# Patient Record
Sex: MALE | Race: WHITE | NOT HISPANIC OR LATINO | Employment: OTHER | URBAN - METROPOLITAN AREA
[De-identification: names, ages, dates, MRNs, and addresses within clinical notes are randomized per-mention and may not be internally consistent; named-entity substitution may affect disease eponyms.]

---

## 2018-04-25 ENCOUNTER — TELEPHONE (OUTPATIENT)
Dept: FAMILY MEDICINE | Facility: CLINIC | Age: 33
End: 2018-04-25

## 2018-04-25 NOTE — TELEPHONE ENCOUNTER
LVM for pt regarding today's appt at 2:30 asking him to return the call to confirm the appt and explaining that he will be seeing Juan Villa NP.  In [place], it is documented that the patient was called three times to confirm the appt and that someone would  the phone and hang up.  Danika

## 2018-05-24 ENCOUNTER — OFFICE VISIT (OUTPATIENT)
Dept: FAMILY MEDICINE | Facility: CLINIC | Age: 33
End: 2018-05-24
Payer: MEDICAID

## 2018-05-24 ENCOUNTER — TELEPHONE (OUTPATIENT)
Dept: FAMILY MEDICINE | Facility: CLINIC | Age: 33
End: 2018-05-24

## 2018-05-24 VITALS
HEART RATE: 104 BPM | BODY MASS INDEX: 30.96 KG/M2 | HEIGHT: 69 IN | DIASTOLIC BLOOD PRESSURE: 67 MMHG | SYSTOLIC BLOOD PRESSURE: 97 MMHG | WEIGHT: 209 LBS | RESPIRATION RATE: 18 BRPM

## 2018-05-24 DIAGNOSIS — M62.838 MUSCLE SPASMS OF BOTH LOWER EXTREMITIES: ICD-10-CM

## 2018-05-24 DIAGNOSIS — L85.3 DRY SKIN: ICD-10-CM

## 2018-05-24 DIAGNOSIS — G82.20 PARAPLEGIA: Primary | ICD-10-CM

## 2018-05-24 PROCEDURE — 99213 OFFICE O/P EST LOW 20 MIN: CPT | Mod: PBBFAC,PN | Performed by: FAMILY MEDICINE

## 2018-05-24 PROCEDURE — 99203 OFFICE O/P NEW LOW 30 MIN: CPT | Mod: S$PBB,,, | Performed by: FAMILY MEDICINE

## 2018-05-24 PROCEDURE — 99999 PR PBB SHADOW E&M-EST. PATIENT-LVL III: CPT | Mod: PBBFAC,,, | Performed by: FAMILY MEDICINE

## 2018-05-24 RX ORDER — BACLOFEN 10 MG/1
10 TABLET ORAL 3 TIMES DAILY
Qty: 90 TABLET | Refills: 11 | Status: SHIPPED | OUTPATIENT
Start: 2018-05-24 | End: 2019-06-12 | Stop reason: SDUPTHER

## 2018-05-24 RX ORDER — TRIAMCINOLONE ACETONIDE 1 MG/G
OINTMENT TOPICAL 2 TIMES DAILY
Qty: 80 G | Refills: 2 | Status: SHIPPED | OUTPATIENT
Start: 2018-05-24 | End: 2020-06-05

## 2018-05-24 RX ORDER — NITROFURANTOIN 25; 75 MG/1; MG/1
CAPSULE ORAL
Refills: 0 | COMMUNITY
Start: 2018-05-17 | End: 2018-07-06
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
## 2018-05-24 NOTE — TELEPHONE ENCOUNTER
I have spoken with Rudi and Tesha at [facility] regarding hand brace and wheelchair.  They do currently have him in their system, the wheelchair that he currently has is issued through them; explained that this wheelchair is not appropriate for patient's condition.  Lucy Vale, representative who handles hand braces, will be notified of need for right hand brace for pt and likely go to patient's home to assess as well as an [employer] person will go to patient's home to assess for special needs wheelchair  They will be back in touch with our office.  Josie

## 2018-05-29 ENCOUNTER — CLINICAL SUPPORT (OUTPATIENT)
Dept: REHABILITATION | Facility: HOSPITAL | Age: 33
End: 2018-05-29
Attending: FAMILY MEDICINE
Payer: MEDICAID

## 2018-05-29 DIAGNOSIS — S14.109D INJURY OF CERVICAL SPINAL CORD, SUBSEQUENT ENCOUNTER: Primary | ICD-10-CM

## 2018-05-29 DIAGNOSIS — R29.898 BILATERAL LEG WEAKNESS: ICD-10-CM

## 2018-05-29 DIAGNOSIS — G82.20 SPINAL CORD ISCHEMIA CAUSING LOWER EXTREMITY PARAPARESIS: ICD-10-CM

## 2018-05-29 DIAGNOSIS — R26.2 DIFFICULTY INITIATING WALKING: ICD-10-CM

## 2018-05-29 DIAGNOSIS — G95.11 SPINAL CORD ISCHEMIA CAUSING LOWER EXTREMITY PARAPARESIS: ICD-10-CM

## 2018-05-29 PROCEDURE — 97167 OT EVAL HIGH COMPLEX 60 MIN: CPT | Mod: PN

## 2018-05-29 PROCEDURE — 97110 THERAPEUTIC EXERCISES: CPT | Mod: PN

## 2018-05-29 PROCEDURE — 97163 PT EVAL HIGH COMPLEX 45 MIN: CPT | Mod: PN
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
## 2018-05-30 ENCOUNTER — TELEPHONE (OUTPATIENT)
Dept: FAMILY MEDICINE | Facility: CLINIC | Age: 33
End: 2018-05-30

## 2018-05-30 DIAGNOSIS — M25.519 SHOULDER PAIN, UNSPECIFIED CHRONICITY, UNSPECIFIED LATERALITY: Primary | ICD-10-CM

## 2018-05-30 PROBLEM — R26.2: Status: ACTIVE | Noted: 2018-05-30

## 2018-05-30 NOTE — TELEPHONE ENCOUNTER
----- Message from Cassie Downs sent at 5/30/2018 12:55 PM CDT -----  Contact: Patient's steptarajayson, Wendy Chauhan  Patient's kylieother is calling to request an order for an x-ray for patient's right shoulder because the patient went to the neurosurgeon and they think that the patient has a frozen shoulder because the patient has a broken neck.  Call Back#[phone]  Thanks

## 2018-06-05 ENCOUNTER — CLINICAL SUPPORT (OUTPATIENT)
Dept: REHABILITATION | Facility: HOSPITAL | Age: 33
End: 2018-06-05
Attending: FAMILY MEDICINE
Payer: MEDICAID

## 2018-06-05 ENCOUNTER — HOSPITAL ENCOUNTER (OUTPATIENT)
Dept: RADIOLOGY | Facility: HOSPITAL | Age: 33
Discharge: HOME | End: 2018-06-05
Attending: FAMILY MEDICINE
Payer: MEDICAID

## 2018-06-05 DIAGNOSIS — M25.519 SHOULDER PAIN, UNSPECIFIED CHRONICITY, UNSPECIFIED LATERALITY: ICD-10-CM

## 2018-06-05 DIAGNOSIS — R26.2 DIFFICULTY INITIATING WALKING: Primary | ICD-10-CM

## 2018-06-05 DIAGNOSIS — R27.8 IMPAIRED COORDINATION OF UPPER EXTREMITY: ICD-10-CM

## 2018-06-05 DIAGNOSIS — R29.898 BILATERAL LEG WEAKNESS: ICD-10-CM

## 2018-06-05 PROCEDURE — 73030 X-RAY EXAM OF SHOULDER: CPT | Mod: TC,FY,RT

## 2018-06-05 PROCEDURE — 97530 THERAPEUTIC ACTIVITIES: CPT | Mod: PN

## 2018-06-05 PROCEDURE — 97116 GAIT TRAINING THERAPY: CPT | Mod: PN

## 2018-06-05 PROCEDURE — 97110 THERAPEUTIC EXERCISES: CPT | Mod: PN

## 2018-06-05 PROCEDURE — 73030 X-RAY EXAM OF SHOULDER: CPT | Mod: 26,RT,, | Performed by: RADIOLOGY

## 2018-06-05 NOTE — PROGRESS NOTES
Physical Therapy Daily Treatment Note     Name: Meek Hanson  Clinic Number: [id number]    Therapy Diagnosis:   Encounter Diagnoses   Name Primary?    Difficulty initiating walking Yes    Bilateral leg weakness      Physician: Cb Warren MD    Visit Date: 2018  Physician Orders: PT Eval and Treat  Medical Diagnosis: Spinal Cord Injury with quadraparesis  Evaluation Date: 2018  Authorization Period Expiration: 18  Plan of Care Certification Period: 2018  Visit #/Visits authorized:     Time In: 10:45  AM   Time Out: 12:00 PM  Total Billable Time: 30 minutes    Precautions: Standard    Subjective      Pt reports: he was compliant with home exercise program given last session.   Response to previous treatment:Meek has been working at home to improve his overall movement.  He stated that he has been working on transfers, propelling his chair and getting his right arm and leg to move.  Meek's mom stated that they are going to [facility] in [place] on  for an assessment for possible admit to IP Rehab.  Apparently he saw the neurosurgeon last week who put in the referral.   Functional change: transfers greatly improved    Pain: 0/10  Location:  Denies any pain at present     Objective     Meek received therapeutic exercises to develop strength for 15 minutes includin. Seated LAQ's, and marching x 2 mins each;   2. Supine ball squeezes x 3 mins  3. Supine heel slides x 2 mins  4. Bridges x 20 reps  5. Seated ant/posterior pelvic tilits to address posture and work trunk muscles x 2 mins    Meek received the following manual therapy techniques:        Meek participated in neuromuscular re-education activities to improve:  for  minutes. The following activities were included:      Meek participated in dynamic functional therapeutic activities to improve functional performance for   minutes, including:      Meek participated in gait  training to improve functional mobility and safety for 15  minutes, including:  Gait training with Rolling Walker - required Min x 2. He demonstrated 2 episodes of LOB and collapse of LE's when he failed to clear his toe. Meek was able to ambulate 22 ft; 20 ft; 40ft; 100ft ; verbal cueing for base of support - patient ambulating with narrow MARGOT - almost crossing his feet at times; cueing to clear toes R>L also. After instruction, he was able to demonstrate improved control during stand <> sit transfer;     Home Exercises Provided and Patient Education Provided     Education provided:   - discussed positive attitude needed when he goes for his assessment to IP Rehab; Meek was alittle hesitant about staying in [place], but discussed the benefits he would receive from participating in an IP Rehab Program.      Written Home Exercises Provided: .  Exercises were reviewed and Meek was able to demonstrate them prior to the end of the session.  Meek demonstrated good  understanding of the education provided.     Pt received a written copy of exercises to perform at home.   See EMR under patient instructions for exercises given.     Meek demonstrated good  understanding of the education provided.     Assessment     Meek has made good progress with his ability to perform standing pivot transfers from last week.  He was able to perform sit<>stand with Min A;  Perform pivot transfer with CGA to/from mat.  Ambulation progression today using Rolling Walker.   Meek is progressing well towards his goals.   Pt prognosis is Excellent.     Pt will continue to benefit from skilled outpatient physical therapy to address the deficits listed in the problem list box on initial evaluation, provide pt/family education and to maximize pt's level of independence in the home and community environment.     Pt's spiritual, cultural and educational needs considered and pt agreeable to plan of care and goals.    Anticipated barriers to  physical therapy: none identified at this time.     Goals:   In 6 weeks, pt. will:  1. Able to perform Supine <>Sit with CGA  2. Able to perform Sit <>Stand with Min A  3. Able to perform Standing Pivot Transfers with Mod A  4. Able to perform Static Standing unsupported x 1 min  Long Term GOALS:  In 12 weeks, pt. Will:  1. Able to perform Supine <>Sit Mod I  2. Able to perform Sit <>Stand Mod I  3. Able to perform standing pivot transfers Mod I  4. Able to ambulate with least restrictive device and CGA x 100ft  5.  be independent with HEP and SX management     Plan     Continue with Skilled Physical Thearpy 2x/week x 6 weeks for gait training; there exercise; neuro-re-education if patient does not get into the IP Rehab program.     Melia Sampson, PT

## 2018-06-05 NOTE — PROGRESS NOTES
Occupational Therapy Daily Treatment Note     Name: Meek Hanson  Clinic Number: [id number]    Therapy Diagnosis:   Encounter Diagnoses   Name Primary?    Difficulty initiating walking Yes    Impaired coordination of upper extremity      Physician: Cb Warren MD    Visit Date: 6/5/2018  Physician Orders: OT treatment  Medical Diagnosis: SCI  Evaluation Date: 05/29/2018    Time In: 10:55  Time Out: 12:05  Total Billable Time: 45 minutes    Precautions: Standard; Fall Risk    Subjective      Pt reports: he was compliant with home exercise program given last session.   Response to previous treatment: positive  Functional change: Transfers have improved     Pain: 5/10  Location: neck     Objective     Meek received therapeutic exercises to develop strength, endurance, ROM, flexibility, posture and core stabilization for  minutes including:   P/AA/AROM B digits with patient demonstrating AROM L hand flex and ext with mild limitations in full digital extension. AROM L wrist ext/flex; forearm sup/pron; elbow flex/ext. P/AAROM R digits in ext; AA/AROM R wrist ext and flex; AROM R forearm sup/pron; AROM R elbow flex/ext.   B shoulder shrugs x 20 AROM     Meek participated in therapeutic activity transfer training performing sit to stand pivot transfer wheelchair to mat MIN Assist.   Patient performed sit to stand transfers wheelchair to RW with VC's for hand placement/safety requiring CGA x 2.  Meek participated ambulation/gait training to improve functional mobility required for ADL's and safety in co-tx with PT, Melia, performing 4 trials with MIN A x 2 with intermittent LE collapsing/weakness requiring physical and verbal cues to correct. Trial distances were: 22 feet; 20 feet; 40 feet; and 100 feet.       Home Exercises Provided and Patient Education Provided     Education provided:   - BUE exercises AROM at home     Meek demonstrated good  understanding of the education provided.      Assessment     Meek did well in today's treatment and has shown improvement in transfers from eval last week. Patient and caregiver report he has an assessment with [facility] in [place]. I feel patient would be a good candidate for SCI rehab at that facility in order to receive aggressive inpatient therapy needs addressing patient's deficits.     Meek is progressing well towards his goals.   Pt prognosis is Good.     Pt will continue to benefit from skilled outpatient physical therapy to address the deficits listed in the problem list box on initial evaluation, provide pt/family education and to maximize pt's level of independence in the home and community environment.     Pt's spiritual, cultural and educational needs considered and pt agreeable to plan of care and goals.    Anticipated barriers to occupational therapy: resources      Plan     Continue OT treatment. Patient is to be assessed for inpatient rehab at [facility] according to mother, on June 11, 2018. Patient was apparently never assessed or considered for rehab previously. Patient is a good candidate for inpatient rehab and I feel he would get the most benefit from inpatient rehab if he is approved. If not, we will continue to treat 2 x per week.     Ansley Lopez, OT      Satisfactory

## 2018-06-07 ENCOUNTER — TELEPHONE (OUTPATIENT)
Dept: FAMILY MEDICINE | Facility: CLINIC | Age: 33
End: 2018-06-07

## 2018-06-07 DIAGNOSIS — G82.20 SPINAL CORD ISCHEMIA CAUSING LOWER EXTREMITY PARAPARESIS: Primary | ICD-10-CM

## 2018-06-07 DIAGNOSIS — G95.11 SPINAL CORD ISCHEMIA CAUSING LOWER EXTREMITY PARAPARESIS: Primary | ICD-10-CM

## 2018-06-07 NOTE — TELEPHONE ENCOUNTER
----- Message from Cecilia Pérez sent at 6/7/2018 10:13 AM CDT -----  Contact: Ana Chauhan (step grandmother)  Type: Needs Medical Advice    Who Called: Ana Chauhan (step grandmother)  Symptoms (please be specific):  NA  How long has patient had these symptoms:  NA  Pharmacy name and phone #:  NA  Best Call Back Number:   Additional Information: Calling to speak with the Nurse to find out where the Wheelchair and  bedside commode was delivered. She said it was ordered by the Doctor last month. They haven't received anything nor heard from anyone. Please advise.

## 2018-06-07 NOTE — TELEPHONE ENCOUNTER
Per our discussion, will you please place the orders for his wheelchair, bedside commode, and hand brace for [facility]?  Pt has not yet heard from [facility], where orders were previously sent last month.  Thanks, Josie

## 2018-06-07 NOTE — TELEPHONE ENCOUNTER
Spoke with Ana Chauhan and informed her that we have given all orders for DME supplies (wheelchair, bedside commode, hand brace) to Zane at [facility] and that [facility] will be in contact with them soon.  Josie

## 2018-06-12 ENCOUNTER — CLINICAL SUPPORT (OUTPATIENT)
Dept: REHABILITATION | Facility: HOSPITAL | Age: 33
End: 2018-06-12
Attending: FAMILY MEDICINE
Payer: MEDICAID

## 2018-06-12 DIAGNOSIS — G82.54 QUADRIPLEGIA, C5-C7, INCOMPLETE: Primary | ICD-10-CM

## 2018-06-12 DIAGNOSIS — R29.898 BILATERAL LEG WEAKNESS: ICD-10-CM

## 2018-06-12 DIAGNOSIS — R26.2 DIFFICULTY INITIATING WALKING: Primary | ICD-10-CM

## 2018-06-12 PROCEDURE — 97110 THERAPEUTIC EXERCISES: CPT | Mod: PN

## 2018-06-12 PROCEDURE — 97112 NEUROMUSCULAR REEDUCATION: CPT | Mod: PN

## 2018-06-12 PROCEDURE — 97530 THERAPEUTIC ACTIVITIES: CPT | Mod: PN

## 2018-06-12 NOTE — PROGRESS NOTES
Occupational Therapy Daily Treatment Note     Name: Meek Hanson  Clinic Number: [id number]    Therapy Diagnosis:   Encounter Diagnosis   Name Primary?    Quadriplegia, C5-C7, incomplete Yes     Physician: Cb Warren MD    Visit Date: 6/12/2018  Physician Orders: OT treatment  Medical Diagnosis: SCI  Evaluation Date: 05/29/2018    Time In: 10:45  Time Out: 12:00  Total Billable Time: 30 minutes (OT)    Precautions: Standard; Fall Risk    Subjective      Pt reports: he was compliant with home exercise program given last session and even performed walking with assistance of 3 caregivers.  Response to previous treatment: positive  Functional change: Transfers and ambulation with assist demonstrates improved     Pain: 5/10  Location: neck     Objective     Meek received therapeutic exercises to develop strength, endurance, ROM, flexibility, posture and core stabilization for  minutes including:   P/AA/AROM B digits with patient demonstrating AROM L hand flex and ext with mild limitations in full digital extension. AROM L wrist ext/flex; forearm sup/pron; elbow flex/ext. P/AAROM R digits in ext; AA/AROM R wrist ext and flex; AROM R forearm sup/pron; AROM R elbow flex/ext.   B shoulder shrugs x 20 AROM   Issuance of yellow theraband with education and demonstration for patient and caregiver (mother) to perform at home BUE resistive exercises    Meek participated in therapeutic activity transfer training performing sit to stand pivot transfer wheelchair to mat MIN Assist.   Static and dynamic sitting activities performed unsupported on mat with Good Static and Fair + dynamic sitting.     Provided CGA of patient using parallel bars with PT direction in gait training.       Home Exercises Provided and Patient Education Provided     Education provided:   - BUE exercises AROM at home with theraband. Issued yellow theraband with demo performed and verbalized understanding from patient  received.    Meek demonstrated good  understanding of the education provided.     Assessment     Meek did well in today's treatment. He reports his MD appointment in [facility] has been rescheduled for June 25th due to an emergency that prevented the MD from being there.    Meek is progressing well towards his goals.   Pt prognosis is Good.     Pt will continue to benefit from skilled outpatient occupational therapy to maximize pt's level of independence in the home and community environment.     Pt's spiritual, cultural and educational needs considered and pt agreeable to plan of care and goals.    Anticipated barriers to occupational therapy: resources      Plan     Continue OT treatment. Patient is to be assessed for inpatient rehab at [facility] according to mother, on June 11, 2018. Patient was apparently never assessed or considered for rehab previously. Patient is a good candidate for inpatient rehab and I feel he would get the most benefit from inpatient rehab if he is approved. If not, we will continue to treat 2 x per week.     Ansley Lopez, OT

## 2018-06-12 NOTE — PROGRESS NOTES
Physical Therapy Daily Treatment Note     Name: Meek Hanson  Clinic Number: [id number]    Therapy Diagnosis:   Encounter Diagnoses   Name Primary?    Bilateral leg weakness     Difficulty initiating walking Yes     Physician: Cb Warren MD    Visit Date: 2018  Physician Orders: PT Eval and Treat  Medical Diagnosis: Spinal Cord Injury with quadraparesis  Evaluation Date: 2018  Authorization Period Expiration: 18  Plan of Care Certification Period: 2018  Visit #/Visits authorized: 3/ 12    Time In: 11:00  AM   Time Out: 12:00 PM  Total Billable Time: 30 minutes    Precautions: Standard    Subjective      Pt reports: he was compliant with home exercise program given last session. Appointment in [place] for IP Rehab assessment was canceled - rescheduled for the .   Response to previous treatment:Meek has been working at home to improve his overall movement.  He stated that he has been working on transfers, propelling his chair and getting his right arm and leg to move. He now has his hospital bed, still waiting on different wheelchair; also has not gotten brace (s) for hands.   Functional change: Meek stated that he was able to go up/down 4 steps to get into the house at home with assistance from 2 people;     Pain: 0/10  Location:  Denies any pain at present     Objective     Meek received therapeutic exercises to develop strength for 15 minutes includin. Seated LAQ's, and marching x 2 mins each;   2. Supine ball squeezes x 3 mins  3. Supine heel slides x 2 mins  4. Bridges x 20 reps  5. Seated ant/posterior pelvic tilits to address posture and work trunk muscles x 2 mins    Meek received the following manual therapy techniques:      Meek participated in neuromuscular re-education activities to improve: weight shifting, trunk stabilization; cross body movements  for  minutes. 20 The following activities were included:  1.  Forward/Backward/Side-Stepping in // bars with CGA x 2; concentrate on quad set on Right to stabilize his knee; clearing toe with stepping by lifting at hip.   2. Toe-Taps on Green Step only - again - weight-shifting techniques with stabilization of Right knee and clearing toe on Right.   3. Unassisted standing: reaching with Left and Right Hand at various heights and distances to grasp cone - also performed across midline activities to work on rotation.       Meek participated in dynamic functional therapeutic activities to improve functional performance for   minutes, including:      Meek participated in gait training to improve functional mobility and safety for 15  minutes, including:  Gait training with Rolling Walker - required CGA x 2. Able to ambulate 50ft x 2; much improved with stance phase on right in terms of stability; able to lock knee in order to progress left foot forward; Did have some trouble with RLE in terms of spasticity today that required several rest breaks to let this resolve.     Home Exercises Provided and Patient Education Provided     Education provided:   - discussed positive attitude needed when he goes for his assessment to IP Rehab; Meek was alittle hesitant about staying in [place], but discussed the benefits he would receive from participating in an IP Rehab Program.      Written Home Exercises Provided: .  Exercises were reviewed and Meek was able to demonstrate them prior to the end of the session.  Meek demonstrated good  understanding of the education provided.     Pt received a written copy of exercises to perform at home.    Meek demonstrated good  understanding of the education provided.     Assessment     Meek has made good progress with his ability to perform standing pivot transfers from last week.  He was able to perform sit<>stand with Min A;  Perform pivot transfer with CGA to/from mat.  Ambulation progression today using Rolling Walker.   Meek is progressing  well towards his goals. He is demonstrating improvements in movement of RLE, improved ability to perform his transfers with CGA.once standing into chair.   Pt prognosis is Excellent.     Pt will continue to benefit from skilled outpatient physical therapy to address the deficits listed in the problem list box on initial evaluation, provide pt/family education and to maximize pt's level of independence in the home and community environment.     Pt's spiritual, cultural and educational needs considered and pt agreeable to plan of care and goals.    Anticipated barriers to physical therapy: none identified at this time.     Goals:   In 6 weeks, pt. will:  1. Able to perform Supine <>Sit with CGA  2. Able to perform Sit <>Stand with Min A  3. Able to perform Standing Pivot Transfers with Mod A  4. Able to perform Static Standing unsupported x 1 min  Long Term GOALS:  In 12 weeks, pt. Will:  1. Able to perform Supine <>Sit Mod I  2. Able to perform Sit <>Stand Mod I  3. Able to perform standing pivot transfers Mod I  4. Able to ambulate with least restrictive device and CGA x 100ft  5.  be independent with HEP and SX management     Plan     Continue with Skilled Physical Thearpy 2x/week x 6 weeks for gait training; there exercise; neuro-re-education if patient does not get into the IP Rehab program.     Melia Sampson, PT

## 2018-06-14 ENCOUNTER — CLINICAL SUPPORT (OUTPATIENT)
Dept: REHABILITATION | Facility: HOSPITAL | Age: 33
End: 2018-06-14
Attending: FAMILY MEDICINE
Payer: MEDICAID

## 2018-06-14 ENCOUNTER — TELEPHONE (OUTPATIENT)
Dept: FAMILY MEDICINE | Facility: CLINIC | Age: 33
End: 2018-06-14

## 2018-06-14 DIAGNOSIS — G95.11 SPINAL CORD ISCHEMIA CAUSING LOWER EXTREMITY PARAPARESIS: ICD-10-CM

## 2018-06-14 DIAGNOSIS — R29.898 BILATERAL LEG WEAKNESS: ICD-10-CM

## 2018-06-14 DIAGNOSIS — R26.2 DIFFICULTY INITIATING WALKING: Primary | ICD-10-CM

## 2018-06-14 DIAGNOSIS — R29.898 BILATERAL LEG WEAKNESS: Primary | ICD-10-CM

## 2018-06-14 DIAGNOSIS — G82.20 SPINAL CORD ISCHEMIA CAUSING LOWER EXTREMITY PARAPARESIS: ICD-10-CM

## 2018-06-14 PROCEDURE — 97110 THERAPEUTIC EXERCISES: CPT | Mod: PN

## 2018-06-14 PROCEDURE — 97530 THERAPEUTIC ACTIVITIES: CPT | Mod: PN

## 2018-06-14 NOTE — PROGRESS NOTES
Occupational Therapy Daily Treatment Note     Name: Meek Hanson  Clinic Number: [id number]    Therapy Diagnosis:   Encounter Diagnoses   Name Primary?    Bilateral leg weakness Yes    Spinal cord ischemia causing lower extremity paraparesis      Physician: Cb Warren MD    Visit Date: 2018  Physician Orders: OT treatment  Medical Diagnosis: SCI  Evaluation Date: 2018    Time In: 11:00  Time Out: 12:00  Total Billable Time: 30 minutes (OT)    Precautions: Standard; Fall Risk    Subjective      Pt reports: he was compliant with home exercise program given last session and even performed walking with assistance of 3 caregivers.  Response to previous treatment: positive  Functional change: Transfers and ambulation with assist demonstrates improved     Pain: 5/10  Location: neck     Objective     Meek received therapeutic exercises to develop strength, endurance, ROM, flexibility, posture and core stabilization for  minutes includin# dumbbell requiring strap for R  assistance secondary to impairment following SCI with patient performing R elbow flexion, protraction, and R shoulder FF x 15 as tolerated with compensation observed secondary to muscle weakness.   2# dumbbell tolerated LUE without use of strap for , patient performing L elbow flexion, protraction, and L shoulder FF x 15 each.  Meek participated in therapeutic activity transfer training performing sit to stand x 5 trials with VC's for hand placement in transfers and VC's to separate from mat as to not use the back of legs on mat for assist.    Static and dynamic sitting activities performed unsupported on mat performing 1# dowel exercises without strap for  assist; in chest press and FF x 10 each.      Provided CGA of patient during gait training in ambulation x 75 feet x 2 trials each in addition to CGA on parallel bars.       Home Exercises Provided and Patient Education Provided      Education provided:   - BUE exercises AROM at home with theraband. Issued yellow theraband with demo performed and verbalized understanding from patient received.    Meek demonstrated good  understanding of the education provided.     Assessment     Meek did well in today's treatment. He reports his MD appointment in [facility] has been rescheduled for June 25th due to an emergency that prevented the MD from being there.    Meek is progressing well towards his goals.   Pt prognosis is Good.     Pt will continue to benefit from skilled outpatient occupational therapy to maximize pt's level of independence in the home and community environment.     Pt's spiritual, cultural and educational needs considered and pt agreeable to plan of care and goals.    Anticipated barriers to occupational therapy: resources      Plan     Continue OT treatment. Patient was not approved for [facility] per mother's report. We will continue to treat 2 x per week. Plan to contact [facility] concerning hand splints.     nAsley Lopez, OT

## 2018-06-14 NOTE — TELEPHONE ENCOUNTER
----- Message from Rena Graves sent at 6/14/2018  3:14 PM CDT -----  Type: Needs Medical Advice    Who Called:  Step grandmother -,Ana Chauhan   Symptoms (please be specific):  NA   How long has patient had these symptoms:  N A  Pharmacy name and phone #:  Best Call Back Number: [phone]  Additional Information: the patient has not received wheel chair, the step grand mother asking for a walker for the patient. Patient beginning  to walk

## 2018-06-14 NOTE — TELEPHONE ENCOUNTER
I have spoken with Zane, who stated that the wheelchair that pt needs costs approximately $5000.00 and Medicaid will not cover this.  Would you like to order him a different, new wheelchair that insurance may cover?  Please advise. Danika

## 2018-06-14 NOTE — PROGRESS NOTES
Physical Therapy Daily Treatment Note     Name: Meek Hanson  Clinic Number: [id number]    Therapy Diagnosis:   Encounter Diagnoses   Name Primary?    Difficulty initiating walking Yes    Bilateral leg weakness      Physician: Cb Warren MD    Visit Date: 2018  Physician Orders: PT Eval and Treat  Medical Diagnosis: Spinal Cord Injury with quadraparesis  Evaluation Date: 2018  Authorization Period Expiration: 18  Plan of Care Certification Period: 2018  Visit #/Visits authorized: 3/ 12    Time In: 11:00  AM   Time Out: 12:00 PM  Total Billable Time: 30 minutes    Precautions: Standard    Subjective      Pt reports: he is feeling ok today; neck pain is better.  Response to previous treatment:Meek has been working at home to improve his overall movement.  He stated that he has been working on transfers, propelling his chair and getting his right arm and leg to move. He now has his hospital bed, still waiting on different wheelchair; also has not gotten brace (s) for hands.   Functional change: Meek stated that he was able to go up/down 4 steps to get into the house at home with assistance from 2 people;     Pain: 0/10  Location:  Denies any pain at present     Objective     Meek received therapeutic exercises to develop strength for 15 minutes includin. Seated LAQ's, and marching x 2 mins each;   2. Supine ball squeezes x 3 mins  3. Supine heel slides x 2 mins  4. Bridges x 20 reps  5. Seated ant/posterior pelvic tilits to address posture and work trunk muscles x 2 mins  6. Sit to stand x 5    Meek received the following manual therapy techniques:      Meek participated in neuromuscular re-education activities to improve: weight shifting, trunk stabilization; cross body movements  for  minutes. 20 The following activities were included:  1. Forward/Backward/Side-Stepping in // bars with CGA x 2; concentrate on quad set on Right to stabilize his knee;  clearing toe with stepping by lifting at hip.   2. Toe-Taps on Green Step only - again - weight-shifting techniques with stabilization of Right knee and clearing toe on Right.   3. Unassisted standing: reaching with Left and Right Hand at various heights and distances to grasp cone - also performed across midline activities to work on rotation.       Meek participated in dynamic functional therapeutic activities to improve functional performance for   minutes, including:      Meek participated in gait training to improve functional mobility and safety for 15  minutes, including:  Gait training with Rolling Walker - required CGA x 2. Able to ambulate 50ft x 2; much improved with stance phase on right in terms of stability; able to lock knee in order to progress left foot forward; Did have some trouble with RLE in terms of spasticity today that required several rest breaks to let this resolve.     Home Exercises Provided and Patient Education Provided     Education provided:   - discussed positive attitude needed when he goes for his assessment to IP Rehab; Meek was alittle hesitant about staying in [place], but discussed the benefits he would receive from participating in an IP Rehab Program.      Written Home Exercises Provided: .  Exercises were reviewed and Meek was able to demonstrate them prior to the end of the session.  Meek demonstrated good  understanding of the education provided.     Pt received a written copy of exercises to perform at home.    Meek demonstrated good  understanding of the education provided.     Assessment     Meek has made good progress with his ability to perform standing pivot transfers from last week.  He was able to perform sit<>stand with Min A;  Perform pivot transfer with CGA to/from mat.  Ambulation progression today using Rolling Walker.   Meek is progressing well towards his goals. He is demonstrating improvements in movement of RLE, improved ability to perform his  transfers with CGA.once standing into chair.   Pt prognosis is Excellent.     Pt will continue to benefit from skilled outpatient physical therapy to address the deficits listed in the problem list box on initial evaluation, provide pt/family education and to maximize pt's level of independence in the home and community environment.     Pt's spiritual, cultural and educational needs considered and pt agreeable to plan of care and goals.    Anticipated barriers to physical therapy: none identified at this time.     Goals:   In 6 weeks, pt. will:  1. Able to perform Supine <>Sit with CGA  2. Able to perform Sit <>Stand with Min A  3. Able to perform Standing Pivot Transfers with Mod A  4. Able to perform Static Standing unsupported x 1 min  Long Term GOALS:  In 12 weeks, pt. Will:  1. Able to perform Supine <>Sit Mod I  2. Able to perform Sit <>Stand Mod I  3. Able to perform standing pivot transfers Mod I  4. Able to ambulate with least restrictive device and CGA x 100ft  5.  be independent with HEP and SX management     Plan     Continue with Skilled Physical Thearpy 2x/week x 6 weeks for gait training; there exercise; neuro-re-education if patient does not get into the IP Rehab program.     Jonathan Favre, PTA

## 2018-06-15 ENCOUNTER — TELEPHONE (OUTPATIENT)
Dept: FAMILY MEDICINE | Facility: CLINIC | Age: 33
End: 2018-06-15

## 2018-06-15 DIAGNOSIS — G82.20 PARAPLEGIA: Primary | ICD-10-CM

## 2018-06-15 NOTE — TELEPHONE ENCOUNTER
Dr. Warren,    I have discussed this with [facility] and they do not have anything that they can fax us to fill out and they will not take a verbal. I was told that the order needs to be written/printed and faxed to them.   Please advise. Thank you, Danika

## 2018-06-15 NOTE — TELEPHONE ENCOUNTER
----- Message from Ansley Lopez OT sent at 6/15/2018  9:50 AM CDT -----  Contact: KELSEY Panchal, called and spoke with Kirsty at [facility]  Dr. Warren,  I just spoke with Kirsty, at [facility] concerning Meek Hanson's equipment order. They did receive the wheelchair order, but it did not have the hand splint order. Could you have someone fax this to [facility] in [place], please? He will need RUE Dorsal Resting Splint vs Finger Extender Hand Orthosis and LUE wrist cock-up splint. The fax is [phone] with attention to Tesha. Thanks.

## 2018-06-15 NOTE — TELEPHONE ENCOUNTER
Spoke with Ric at PT and they are going to evaluate pt for hand brace and wheelchair and give recommendations.  Danika

## 2018-06-15 NOTE — TELEPHONE ENCOUNTER
Would you reach out to Zane and see what he would recommend? I would hate to send over another order that would not be covered by insurance and would be out the patient's price range.

## 2018-06-15 NOTE — PROGRESS NOTES
06/15/2018 10:19AM    I spoke with Kirsty, at [facility] concerning Meek Hanson's equipment order. They did receive the wheelchair order, but it did not have the hand splint order. Have sent a message to Dr. Warren requesting fax to [facility] in [place], please. He will need RUE Dorsal Resting Splint vs Finger Extender Hand Orthosis and LUE wrist cock-up splint. The fax is [phone] with attention to Tesha. They have requested OT and PT notes for wheelchair. OT has sent OT and PT evaluations after speaking with Melia Sampson, LEONELA.     Ansley Lopez, OT

## 2018-06-19 ENCOUNTER — CLINICAL SUPPORT (OUTPATIENT)
Dept: REHABILITATION | Facility: HOSPITAL | Age: 33
End: 2018-06-19
Attending: FAMILY MEDICINE
Payer: MEDICAID

## 2018-06-19 DIAGNOSIS — M62.81 MUSCLE WEAKNESS: Primary | ICD-10-CM

## 2018-06-19 DIAGNOSIS — R26.2 DIFFICULTY INITIATING WALKING: Primary | ICD-10-CM

## 2018-06-19 PROCEDURE — 97112 NEUROMUSCULAR REEDUCATION: CPT | Mod: PN

## 2018-06-19 PROCEDURE — 97530 THERAPEUTIC ACTIVITIES: CPT | Mod: PN

## 2018-06-19 PROCEDURE — 97110 THERAPEUTIC EXERCISES: CPT | Mod: PN

## 2018-06-19 NOTE — PROGRESS NOTES
Physical Therapy Daily Treatment Note     Name: Meek Hanson  Clinic Number: [id number]    Therapy Diagnosis:   Encounter Diagnosis   Name Primary?    Difficulty initiating walking Yes     Physician: Cb Warren MD    Visit Date: 2018  Physician Orders: PT Eval and Treat  Medical Diagnosis: Spinal Cord Injury with quadraparesis  Evaluation Date: 2018  Authorization Period Expiration: 18  Plan of Care Certification Period: 2018  Visit #/Visits authorized:     Time In: 11:00  AM   Time Out: 12:00 PM  Total Billable Time: 30 minutes    Precautions: Standard    Subjective      Pt reports: he is feeling ok today; neck pain is better.  Response to previous treatment:Meek has been working at home to improve his overall movement.  He stated that he has been working on transfers, propelling his chair and getting his right arm and leg to move. He now has his hospital bed, still waiting on different wheelchair; also has not gotten brace (s) for hands.   Functional change: Meek stated that he was able to go up/down 4 steps to get into the house at home with assistance from 2 people;     Pain: 0/10  Location:  Denies any pain at present     Objective     Meek received therapeutic exercises to develop strength for 15 minutes includin. Seated LAQ's, and marching x 2 mins each;   2. Supine ball squeezes x 3 mins  3. Supine heel slides x 2 mins  4. Bridges x 20 reps  5. Seated ant/posterior pelvic tilits to address posture and work trunk muscles x 2 mins  6. Sit to stand x 5    Meek received the following manual therapy techniques:      Meek participated in neuromuscular re-education activities to improve: weight shifting, trunk stabilization; cross body movements  for  minutes. 20 The following activities were included:  1. Forward/Backward/Side-Stepping in // bars with CGA x 2; concentrate on quad set on Right to stabilize his knee; clearing toe with stepping  by lifting at hip.   2. Toe-Taps on Green Step only - again - weight-shifting techniques with stabilization of Right knee and clearing toe on Right.   3. Unassisted standing: reaching with Left and Right Hand at various heights and distances to grasp cone - also performed across midline activities to work on rotation.       Meek participated in dynamic functional therapeutic activities to improve functional performance for   minutes, including:      Meek participated in gait training to improve functional mobility and safety for 15  minutes, including:  Gait training with Rolling Walker - required CGA x 2. Able to ambulate 50ft x 2; much improved with stance phase on right in terms of stability; able to lock knee in order to progress left foot forward; Did have some trouble with RLE in terms of spasticity today that required several rest breaks to let this resolve.     Home Exercises Provided and Patient Education Provided     Education provided:   - discussed positive attitude needed when he goes for his assessment to IP Rehab; Meek was alittle hesitant about staying in [place], but discussed the benefits he would receive from participating in an IP Rehab Program.      Written Home Exercises Provided: .  Exercises were reviewed and Meek was able to demonstrate them prior to the end of the session.  Meek demonstrated good  understanding of the education provided.     Pt received a written copy of exercises to perform at home.    Meek demonstrated good  understanding of the education provided.     Assessment     Meek has made good progress with his ability to perform standing pivot transfers from last week.  He was able to perform sit<>stand with Min A;  Perform pivot transfer with CGA to/from mat.  Ambulation progression today using Rolling Walker.   Meek is progressing well towards his goals. He is demonstrating improvements in movement of RLE, improved ability to perform his transfers with CGA.once  standing into chair.   Pt prognosis is Excellent.     Pt will continue to benefit from skilled outpatient physical therapy to address the deficits listed in the problem list box on initial evaluation, provide pt/family education and to maximize pt's level of independence in the home and community environment.     Pt's spiritual, cultural and educational needs considered and pt agreeable to plan of care and goals.    Anticipated barriers to physical therapy: none identified at this time.     Goals:   In 6 weeks, pt. will:  1. Able to perform Supine <>Sit with CGA  2. Able to perform Sit <>Stand with Min A  3. Able to perform Standing Pivot Transfers with Mod A  4. Able to perform Static Standing unsupported x 1 min  Long Term GOALS:  In 12 weeks, pt. Will:  1. Able to perform Supine <>Sit Mod I  2. Able to perform Sit <>Stand Mod I  3. Able to perform standing pivot transfers Mod I  4. Able to ambulate with least restrictive device and CGA x 100ft  5.  be independent with HEP and SX management     Plan     Continue with Skilled Physical Thearpy 2x/week x 6 weeks for gait training; there exercise; neuro-re-education if patient does not get into the IP Rehab program.     Jonathan Favre, PTA

## 2018-06-19 NOTE — PROGRESS NOTES
Occupational Therapy Daily Treatment Note     Name: Meek Hanson  Clinic Number: [id number]    Therapy Diagnosis:   Encounter Diagnosis   Name Primary?    Muscle weakness Yes     Physician: Cb Warren MD    Visit Date: 2018  Physician Orders: OT treatment  Medical Diagnosis: SCI  Evaluation Date: 2018    Time In: 11:00  Time Out: 12:00  Total Billable Time: 30 minutes (OT)    Precautions: Standard; Fall Risk    Subjective      Pt reports: getting to see his 8 year old daughter this past weekend  Response to previous treatment: positive  Functional change:  Patient continues to show improvement in strength in ambulation with assistance    Pain: no complaints  Location: neck     Objective     Meek received therapeutic exercises to develop strength, endurance, ROM, flexibility, posture and core stabilization for  minutes includin# dumbbell tolerated LUE without use of strap for  and RUE with use of strap for , patient performing elbow flexion, protraction, and ER as tolerated x 20 each in dynamic sitting unsupported EOM.  Meek participated in therapeutic activity transfer training performing sit to stand x 5 trials with VC's for hand placement in transfers and VC's to separate from mat as to not use the back of legs on mat for assist with improved carryover from previous visit.    Static and dynamic sitting activities performed unsupported on mat performing 2# dowel exercises without strap for  assist; in chest press, FF, elevation x 15 each.      Provided CGA of patient during gait training with PT in ambulation and CGA on parallel bars.       Home Exercises Provided and Patient Education Provided     Education provided:   - BUE exercises AROM at home with theraband. Issued yellow theraband with demo performed and verbalized understanding from patient received.    Meek demonstrated good  understanding of the education provided.     Assessment        Meek is progressing well towards his goals. He is showing improvement in ambulation with assistance.   Pt prognosis is Good.     Pt will continue to benefit from skilled outpatient occupational therapy to maximize pt's level of independence in the home and community environment.     Pt's spiritual, cultural and educational needs considered and pt agreeable to plan of care and goals.    Anticipated barriers to occupational therapy: resources      Plan     Continue OT treatment.OT contacted [facility] concerning splints with report there was not an order for the splints in the order they received. I sent a request through In basket to Dr. Warren requesting an order to [facility] for splints.     Ansley Lopez, OT

## 2018-06-26 ENCOUNTER — CLINICAL SUPPORT (OUTPATIENT)
Dept: REHABILITATION | Facility: HOSPITAL | Age: 33
End: 2018-06-26
Attending: FAMILY MEDICINE
Payer: MEDICAID

## 2018-06-26 DIAGNOSIS — M62.81 MUSCLE WEAKNESS: Primary | ICD-10-CM

## 2018-06-26 DIAGNOSIS — R26.2 DIFFICULTY INITIATING WALKING: Primary | ICD-10-CM

## 2018-06-26 DIAGNOSIS — R29.898 BILATERAL LEG WEAKNESS: ICD-10-CM

## 2018-06-26 PROCEDURE — 97110 THERAPEUTIC EXERCISES: CPT | Mod: PN

## 2018-06-26 PROCEDURE — 97112 NEUROMUSCULAR REEDUCATION: CPT | Mod: PN

## 2018-06-26 NOTE — PROGRESS NOTES
Physical Therapy Daily Treatment Note     Name: Meek Hanson  Clinic Number: [id number]    Therapy Diagnosis:   Encounter Diagnoses   Name Primary?    Difficulty initiating walking Yes    Bilateral leg weakness      Physician: Cb Warren MD    Visit Date: 2018  Physician Orders: PT Eval and Treat  Medical Diagnosis: Spinal Cord Injury with quadraparesis  Evaluation Date: 2018  Authorization Period Expiration: 18  Plan of Care Certification Period: 2018  Visit #/Visits authorized:     Time In: 11:00  AM   Time Out: 12:00 PM  Total Billable Time: 30 minutes    Precautions: Standard    Subjective      Pt reports: he is feeling ok today; neck pain is better.  Response to previous treatment:Meek has been working at home to improve his overall movement.  He stated that he has been working on transfers, propelling his chair and getting his right arm and leg to move. He now has his hospital bed, still waiting on different wheelchair; also has not gotten brace (s) for hands.   Functional change: Meek stated that he was able to go up/down 4 steps to get into the house at home with assistance from 2 people;     Pain: 0/10  Location:  Denies any pain at present     Objective     Meek received therapeutic exercises to develop strength for 15 minutes includin. Seated LAQ's, and marching x 2 mins each;   2. Supine ball squeezes x 3 mins  3. Supine heel slides x 2 mins  4. Bridges x 20 reps  5. Seated ant/posterior pelvic tilits to address posture and work trunk muscles x 2 mins  6. Sit to stand x 5  7. Nustep level 5 x 25 mins    Meek received the following manual therapy techniques:      Meek participated in neuromuscular re-education activities to improve: weight shifting, trunk stabilization; cross body movements  for  minutes. 20 The following activities were included:  1. Forward/Backward/Side-Stepping in // bars with CGA x 2; concentrate on quad set on  Right to stabilize his knee; clearing toe with stepping by lifting at hip.   2. Toe-Taps on Green Step only - again - weight-shifting techniques with stabilization of Right knee and clearing toe on Right.   3. Unassisted standing: reaching with Left and Right Hand at various heights and distances to grasp cone - also performed across midline activities to work on rotation.       Meek participated in dynamic functional therapeutic activities to improve functional performance for   minutes, including:      Meek participated in gait training to improve functional mobility and safety for 15  minutes, including:  Gait training with Rolling Walker - required CGA x 2. Able to ambulate 50ft x 2; much improved with stance phase on right in terms of stability; able to lock knee in order to progress left foot forward; Did have some trouble with RLE in terms of spasticity today that required several rest breaks to let this resolve.     Home Exercises Provided and Patient Education Provided     Education provided:   - discussed positive attitude needed when he goes for his assessment to IP Rehab; Meek was alittle hesitant about staying in [place], but discussed the benefits he would receive from participating in an IP Rehab Program.      Written Home Exercises Provided: .  Exercises were reviewed and Meek was able to demonstrate them prior to the end of the session.  Meek demonstrated good  understanding of the education provided.     Pt received a written copy of exercises to perform at home.    Meek demonstrated good  understanding of the education provided.     Assessment     Meek has made good progress with his ability to perform standing pivot transfers from last week.  He was able to perform sit<>stand with Min A;  Perform pivot transfer with CGA to/from mat.  Ambulation progression today using Rolling Walker.   Meek is progressing well towards his goals. He is demonstrating improvements in movement of RLE,  improved ability to perform his transfers with CGA.once standing into chair. Improved control with toe taps.  Pt prognosis is Excellent.     Pt will continue to benefit from skilled outpatient physical therapy to address the deficits listed in the problem list box on initial evaluation, provide pt/family education and to maximize pt's level of independence in the home and community environment.     Pt's spiritual, cultural and educational needs considered and pt agreeable to plan of care and goals.    Anticipated barriers to physical therapy: none identified at this time.     Goals:   In 6 weeks, pt. will:  1. Able to perform Supine <>Sit with CGA  2. Able to perform Sit <>Stand with Min A  3. Able to perform Standing Pivot Transfers with Mod A  4. Able to perform Static Standing unsupported x 1 min  Long Term GOALS:  In 12 weeks, pt. Will:  1. Able to perform Supine <>Sit Mod I  2. Able to perform Sit <>Stand Mod I  3. Able to perform standing pivot transfers Mod I  4. Able to ambulate with least restrictive device and CGA x 100ft  5.  be independent with HEP and SX management     Plan     Continue with Skilled Physical Thearpy 2x/week x 6 weeks for gait training; there exercise; neuro-re-education if patient does not get into the IP Rehab program.     Jonathan Favre, PTA

## 2018-06-26 NOTE — PROGRESS NOTES
Occupational Therapy Daily Treatment Note     Name: Meek Hanson  Clinic Number: [id number]    Therapy Diagnosis:   Encounter Diagnosis   Name Primary?    Muscle weakness Yes     Physician: Cb Warren MD    Visit Date: 6/26/2018  Physician Orders: OT treatment  Medical Diagnosis: SCI  Evaluation Date: 05/29/2018    Time In: 11:00  Time Out: 12:00  Total Billable Time: 30 minutes (OT)    Precautions: Standard; Fall Risk    Subjective      Pt reports: he is doing well today  Response to previous treatment: positive  Functional change:  Patient continues to show improvement in strength in ambulation with assistance    Pain: no complaints  Location: neck     Objective     Meek received therapeutic exercises to develop strength, endurance, ROM, flexibility, posture and core stabilization for  minutes including:     AROM B hand flex/ext as tolerated  AROM B wrist EXT and FLEX  AROM B forearm Sup/Pron  AROM B shoulder shrugs    Meek participated in neuromuscular reeducation performing:    Static and dynamic sitting activities performed unsupported on mat performing 2# dowel exercises without strap for  assist; in chest press, FF, elevation x 15 each.      Provided CGA of patient during gait training with PT in ambulation and CGA on parallel bars.       Home Exercises Provided and Patient Education Provided     Education provided:   - BUE exercises AROM at home with theraband. Issued yellow theraband with demo performed and verbalized understanding from patient received.    Meek demonstrated good  understanding of the education provided.     Assessment       Meek is progressing well towards his goals. He is showing improvement in ambulation with assistance.   Pt prognosis is Good.     Pt will continue to benefit from skilled outpatient occupational therapy to maximize pt's level of independence in the home and community environment.     Pt's spiritual, cultural and educational needs  considered and pt agreeable to plan of care and goals.    Anticipated barriers to occupational therapy: resources      Plan     Continue OT treatment. Patient reports not having received his splints or equipment from [facility] yet.      Ansley Lopez, OT

## 2018-06-28 ENCOUNTER — CLINICAL SUPPORT (OUTPATIENT)
Dept: REHABILITATION | Facility: HOSPITAL | Age: 33
End: 2018-06-28
Attending: FAMILY MEDICINE
Payer: MEDICAID

## 2018-06-28 DIAGNOSIS — M62.81 MUSCLE WEAKNESS: Primary | ICD-10-CM

## 2018-06-28 DIAGNOSIS — R29.898 BILATERAL LEG WEAKNESS: ICD-10-CM

## 2018-06-28 DIAGNOSIS — R26.2 DIFFICULTY INITIATING WALKING: Primary | ICD-10-CM

## 2018-06-28 PROCEDURE — 97110 THERAPEUTIC EXERCISES: CPT | Mod: PN

## 2018-06-28 NOTE — PROGRESS NOTES
Occupational Therapy Daily Treatment Note     Name: Meek Hanson  Clinic Number: [id number]    Therapy Diagnosis:   Encounter Diagnosis   Name Primary?    Muscle weakness Yes     Physician: Cb Warren MD    Visit Date: 6/28/2018  Physician Orders: OT treatment  Medical Diagnosis: SCI  Evaluation Date: 05/29/2018    Time In: 11:00  Time Out: 12:00  Total Billable Time: 20 minutes (OT)    Precautions: Standard; Fall Risk    Subjective      Pt reports: he is doing well today  Response to previous treatment: positive  Functional change:  Patient continues to show improvement in strength in ambulation with assistance    Pain: no complaints  Location: neck     Objective     Meek received therapeutic exercises to develop strength, endurance, ROM, flexibility, posture and core stabilization for  minutes including:     AROM B hand flex/ext as tolerated  AROM B wrist EXT and FLEX  AROM B forearm Sup/Pron  AROM B shoulder shrugs   3# dowel in chest press, FF, elbow flexion x 20 each           Home Exercises Provided and Patient Education Provided     Education provided:   - BUE exercises AROM at home with theraband. Issued yellow theraband with demo performed and verbalized understanding from patient received.    Meek demonstrated good  understanding of the education provided.     Assessment       Meek is progressing well towards his goals. He is showing improvement in ambulation with assistance.   Pt prognosis is Good.     Pt will continue to benefit from skilled outpatient occupational therapy to maximize pt's level of independence in the home and community environment.     Pt's spiritual, cultural and educational needs considered and pt agreeable to plan of care and goals.    Anticipated barriers to occupational therapy: resources      Plan     Continue OT treatment. Patient reports not having received his splints or equipment from [facility] yet.      Ansley Lopez, OT

## 2018-06-28 NOTE — PROGRESS NOTES
Physical Therapy Daily Treatment Note     Name: Meek Hanson  Clinic Number: [id number]    Therapy Diagnosis:   Encounter Diagnoses   Name Primary?    Difficulty initiating walking Yes    Bilateral leg weakness      Physician: Cb Warren MD    Visit Date: 2018  Physician Orders: PT Eval and Treat  Medical Diagnosis: Spinal Cord Injury with quadraparesis  Evaluation Date: 2018  Authorization Period Expiration: 18  Plan of Care Certification Period: 2018  Visit #/Visits authorized:     Time In: 11:00  AM   Time Out: 12:00 PM  Total Billable Time: 30 minutes    Precautions: Standard    Subjective      Pt reports: he is feeling ok today; neck pain is better.  Response to previous treatment:Meek has been working at home to improve his overall movement.  He stated that he has been working on transfers, propelling his chair and getting his right arm and leg to move. He now has his hospital bed, still waiting on different wheelchair; also has not gotten brace (s) for hands.   Functional change: Meek stated that he was able to go up/down 4 steps to get into the house at home with assistance from 2 people;     Pain: 0/10  Location:  Denies any pain at present     Objective     Meek received therapeutic exercises to develop strength for 15 minutes includin. Seated LAQ's, and marching x 2 mins each;   2. Supine ball squeezes x 3 mins  3. Supine heel slides x 2 mins  4. Bridges x 20 reps  5. Seated ant/posterior pelvic tilits to address posture and work trunk muscles x 2 mins  6. Sit to stand x 10  7. Nustep level 5 x 25 mins    Meek received the following manual therapy techniques:      Meek participated in neuromuscular re-education activities to improve: weight shifting, trunk stabilization; cross body movements  for  minutes. 20 The following activities were included:  1. Forward/Backward/Side-Stepping in // bars with CGA x 2; concentrate on quad set on  Right to stabilize his knee; clearing toe with stepping by lifting at hip.   2. Toe-Taps on Green Step only - again - weight-shifting techniques with stabilization of Right knee and clearing toe on Right.   3. Unassisted standing: reaching with Left and Right Hand at various heights and distances to grasp cone - also performed across midline activities to work on rotation.       Meek participated in dynamic functional therapeutic activities to improve functional performance for   minutes, including:      Meek participated in gait training to improve functional mobility and safety for 15  minutes, including:  Gait training with Rolling Walker - required CGA x 2. Able to ambulate 50ft x 2; much improved with stance phase on right in terms of stability; able to lock knee in order to progress left foot forward; Did have some trouble with RLE in terms of spasticity today that required several rest breaks to let this resolve.     Home Exercises Provided and Patient Education Provided     Education provided:   - discussed positive attitude needed when he goes for his assessment to IP Rehab; Meek was alittle hesitant about staying in [place], but discussed the benefits he would receive from participating in an IP Rehab Program.      Written Home Exercises Provided: .  Exercises were reviewed and Meek was able to demonstrate them prior to the end of the session.  Meek demonstrated good  understanding of the education provided.     Pt received a written copy of exercises to perform at home.    Meek demonstrated good  understanding of the education provided.     Assessment     Meek has made good progress with his ability to perform standing pivot transfers from last week.  He was able to perform sit<>stand with Min A;  Perform pivot transfer with CGA to/from mat.  Ambulation progression today using Rolling Walker.   Meek is progressing well towards his goals. He is demonstrating improvements in movement of RLE,  improved ability to perform his transfers with CGA.once standing into chair. Improved control with toe taps.  Pt prognosis is Excellent.     Pt will continue to benefit from skilled outpatient physical therapy to address the deficits listed in the problem list box on initial evaluation, provide pt/family education and to maximize pt's level of independence in the home and community environment.     Pt's spiritual, cultural and educational needs considered and pt agreeable to plan of care and goals.    Anticipated barriers to physical therapy: none identified at this time.     Goals:   In 6 weeks, pt. will:  1. Able to perform Supine <>Sit with CGA  2. Able to perform Sit <>Stand with Min A  3. Able to perform Standing Pivot Transfers with Mod A  4. Able to perform Static Standing unsupported x 1 min  Long Term GOALS:  In 12 weeks, pt. Will:  1. Able to perform Supine <>Sit Mod I  2. Able to perform Sit <>Stand Mod I  3. Able to perform standing pivot transfers Mod I  4. Able to ambulate with least restrictive device and CGA x 100ft  5.  be independent with HEP and SX management     Plan     Continue with Skilled Physical Thearpy 2x/week x 6 weeks for gait training; there exercise; neuro-re-education if patient does not get into the IP Rehab program.     Jonathan Favre, PTA

## 2018-07-03 ENCOUNTER — CLINICAL SUPPORT (OUTPATIENT)
Dept: REHABILITATION | Facility: HOSPITAL | Age: 33
End: 2018-07-03
Attending: FAMILY MEDICINE
Payer: MEDICAID

## 2018-07-03 DIAGNOSIS — M62.81 MUSCLE WEAKNESS: ICD-10-CM

## 2018-07-03 DIAGNOSIS — M62.81 MUSCLE WEAKNESS: Primary | ICD-10-CM

## 2018-07-03 DIAGNOSIS — R26.2 DIFFICULTY INITIATING WALKING: Primary | ICD-10-CM

## 2018-07-03 PROCEDURE — 97112 NEUROMUSCULAR REEDUCATION: CPT | Mod: PN

## 2018-07-03 PROCEDURE — 97110 THERAPEUTIC EXERCISES: CPT | Mod: PN

## 2018-07-03 NOTE — PROGRESS NOTES
Physical Therapy Daily Treatment Note     Name: Meek Hanson  Clinic Number: [id number]    Therapy Diagnosis:   Encounter Diagnoses   Name Primary?    Difficulty initiating walking Yes    Muscle weakness      Physician: Cb Warren MD    Visit Date: 7/3/2018  Physician Orders: PT Eval and Treat  Medical Diagnosis: Spinal Cord Injury with quadraparesis  Evaluation Date: 2018  Authorization Period Expiration: 18  Plan of Care Certification Period: 2018  Visit #/Visits authorized:     Time In: 10:00 AM   Time Out: 11:00 PM  Total Billable Time: 45 minutes    Precautions: Standard    Subjective      Pt reports: No new c/o's; states he has been using a cane to get around at home.   Response to previous treatment:Meek has been working at home to improve his overall movement.  He stated that he has been working on transfers, propelling his chair and getting his right arm and leg to move. He now has his hospital bed, still waiting on different wheelchair; also has not gotten brace (s) for hands.   Functional change: Meek stated that he was able to go up/down 4 steps to get into the house at home with assistance from 2 people;     Pain: 0/10  Location:  Denies any pain at present     Objective     Meek received therapeutic exercises to develop strength for 15 minutes includin. Seated LAQ's, and marching x 2 mins each;   2. Supine ball squeezes x 3 mins  3. Supine heel slides x 2 mins  4. Bridges x 20 reps  5. SAQ's x 3 mins  6. Sit to stand x 10  7. Nustep level 5 x 15 mins    Meek received the following manual therapy techniques:      Meek participated in neuromuscular re-education activities to improve: weight shifting, trunk stabilization; cross body movements  For 20  minutes. The following activities were included:  1. Forward/Backward/Side-Stepping in // bars with CGA x 1; concentrate on quad set on Right to stabilize his knee; clearing toe with  stepping by lifting at hip.   2. Toe-Taps on Green Step only - again - weight-shifting techniques with stabilization of Right knee and clearing toe on Right.   3. Unassisted standing: reaching with Left and Right Hand at various heights and distances to grasp cone - also performed across midline activities to work on rotation.       Meek participated in dynamic functional therapeutic activities to improve functional performance for   minutes, including:      Meek participated in gait training to improve functional mobility and safety for 15  minutes, including:  Gait training with Rolling Walker - required CGA x 1. Able to ambulate x 200ft; much improved with stance phase on right in terms of stability; able to lock knee in order to progress left foot forward;     Home Exercises Provided and Patient Education Provided     Education provided:   - discussed positive attitude needed when he goes for his assessment to IP Rehab; Meek was alittle hesitant about staying in [place], but discussed the benefits he would receive from participating in an IP Rehab Program.      Written Home Exercises Provided: .  Exercises were reviewed and Meek was able to demonstrate them prior to the end of the session.  Meek demonstrated good  understanding of the education provided.     Pt received a written copy of exercises to perform at home.    Meek demonstrated good  understanding of the education provided.     Assessment     Meek has made good progress with his ability to perform standing pivot transfers from last week.  He was able to perform sit<>stand with Min A;  Perform pivot transfer with CGA to/from mat.  Ambulation progression today using Rolling Walker.   Meek is progressing well towards his goals. He is demonstrating improvements in movement of RLE, improved ability to perform his transfers with CGA.once standing into chair. Improved control with toe taps.  Pt prognosis is Excellent.     Pt will continue to benefit  from skilled outpatient physical therapy to address the deficits listed in the problem list box on initial evaluation, provide pt/family education and to maximize pt's level of independence in the home and community environment.     Pt's spiritual, cultural and educational needs considered and pt agreeable to plan of care and goals.    Anticipated barriers to physical therapy: none identified at this time.     Goals:   In 6 weeks, pt. will:  1. Able to perform Supine <>Sit with CGA  2. Able to perform Sit <>Stand with Min A  3. Able to perform Standing Pivot Transfers with Mod A  4. Able to perform Static Standing unsupported x 1 min  Long Term GOALS:  In 12 weeks, pt. Will:  1. Able to perform Supine <>Sit Mod I  2. Able to perform Sit <>Stand Mod I  3. Able to perform standing pivot transfers Mod I  4. Able to ambulate with least restrictive device and CGA x 100ft  5.  be independent with HEP and SX management     Plan     Continue with Skilled Physical Thearpy 2x/week x 6 weeks for gait training; there exercise; neuro-re-education if patient does not get into the IP Rehab program.     Jonathan Favre, PTA

## 2018-07-03 NOTE — PROGRESS NOTES
Occupational Therapy Daily Treatment Note     Name: Meek Hanson  Clinic Number: [id number]    Therapy Diagnosis:   Encounter Diagnosis   Name Primary?    Muscle weakness Yes     Physician: Cb Warren MD    Visit Date: 7/3/2018  Physician Orders: OT treatment  Medical Diagnosis: SCI  Evaluation Date: 05/29/2018    Time In: 11:05  Time Out: 11:45  Total Billable Time: 40 minutes     Precautions: Standard; Fall Risk    Subjective      Pt reports: he is doing well today  Response to previous treatment: positive  Functional change:  Patient continues to show improvement in strength in ambulation with assistance    Pain: no complaints  Location: neck     Objective     Meek received therapeutic exercises to develop strength, endurance, ROM, flexibility, posture and core stabilization for  minutes including:     Nu-Step x 12 minutes (due to patient wanting to do additional Nu-Step following PT treatment) using  attachment for   AROM B hand flex as tolerated with Light (L) and Extra light (R) digi flex  AROM B wrist EXT and FLEX with 2# dumbbell  AROM B forearm Sup/Pron with 2# dumbbell  AROM B ER with 2# dumbbell with increasing incoordination and difficulty RUE   4# dowel in chest press, FF, elbow flexion x 20 each  Pulleys performed with hand over hand  assist RUE x 1 minute  Sit to Stand x 6 reps from mat with CGA       Home Exercises Provided and Patient Education Provided     Education provided:   - BUE exercises AROM at home with theraband. Issued yellow theraband with demo performed and verbalized understanding from patient received.    Meek demonstrated good  understanding of the education provided.     Assessment       Meek is progressing well towards his goals. He is showing improvement in ambulation with assistance.   Pt prognosis is Good.     Pt will continue to benefit from skilled outpatient occupational therapy to maximize pt's level of independence in the home  and community environment.     Pt's spiritual, cultural and educational needs considered and pt agreeable to plan of care and goals.    Anticipated barriers to occupational therapy: resources      Plan     Continue OT treatment. Patient reports not having received his splints or equipment from [facility] yet.      Ansley Lopez, OT

## 2018-07-05 ENCOUNTER — CLINICAL SUPPORT (OUTPATIENT)
Dept: REHABILITATION | Facility: HOSPITAL | Age: 33
End: 2018-07-05
Attending: FAMILY MEDICINE
Payer: MEDICAID

## 2018-07-05 DIAGNOSIS — M62.81 MUSCLE WEAKNESS: Primary | ICD-10-CM

## 2018-07-05 DIAGNOSIS — M62.81 MUSCLE WEAKNESS: ICD-10-CM

## 2018-07-05 DIAGNOSIS — R26.2 DIFFICULTY INITIATING WALKING: Primary | ICD-10-CM

## 2018-07-05 PROCEDURE — 97112 NEUROMUSCULAR REEDUCATION: CPT | Mod: PN

## 2018-07-05 PROCEDURE — 97110 THERAPEUTIC EXERCISES: CPT | Mod: PN

## 2018-07-05 PROCEDURE — 97530 THERAPEUTIC ACTIVITIES: CPT | Mod: PN

## 2018-07-05 NOTE — PROGRESS NOTES
Occupational Therapy Daily Treatment Note     Name: Meek Hanson  Clinic Number: [id number]    Therapy Diagnosis:   Encounter Diagnosis   Name Primary?    Muscle weakness Yes     Physician: Cb Warren MD    Visit Date: 7/5/2018  Physician Orders: OT treatment  Medical Diagnosis: SCI  Evaluation Date: 05/29/2018    Time In: 11:00  Time Out: 11:50  Total Billable Time: 40 minutes     Precautions: Standard; Fall Risk    Subjective      Pt reports: he is doing well today  Response to previous treatment: positive  Functional change:  Patient continues to show improvement in strength in ambulation with assistance    Pain: no complaints  Location: neck     Objective     Meek received therapeutic exercises to develop strength, endurance, ROM, flexibility, posture and core stabilization for  minutes including:     UBE x 5 mins MIN resist  AROM B hand flex as tolerated with Light (L) and Extra light (R) digi flex  AROM B wrist EXT and FLEX with 2# dumbbell  AROM B forearm Sup/Pron with 2# dumbbell  AROM B ER with 2# dumbbell with increasing incoordination and difficulty RUE   4# dowel in chest press, FF, elbow flexion x 20 each  Sit to Stand x 10 reps from mat with CGA     FM coordination strengthening:   Extra light clothespins tree RUE  Extra light, Light, Medium clothespins tree LUE  B hand task bead stringing with  holding with R and placing bead on with L and pulling bead with R in pincer   Pincer  LUE picking up small geometric shapes and placing in matching cut outs successfully with upgrade to task using small resistive clothespin    Home Exercises Provided and Patient Education Provided     Education provided:   - BUE exercises AROM at home with theraband. Issued yellow theraband with demo performed and verbalized understanding from patient received.    Meek demonstrated good  understanding of the education provided.     Assessment       Meek is progressing  well towards his goals. He is showing improvement in ambulation with assistance.   Pt prognosis is Good.     Pt will continue to benefit from skilled outpatient occupational therapy to maximize pt's level of independence in the home and community environment.     Pt's spiritual, cultural and educational needs considered and pt agreeable to plan of care and goals.    Anticipated barriers to occupational therapy: resources      Plan     Continue OT treatment. Patient reports not having received his splints or equipment from [facility] yet.      Ansley Lopez, OT

## 2018-07-05 NOTE — PROGRESS NOTES
Physical Therapy Daily Treatment Note     Name: Meek Hanson  Clinic Number: [id number]    Therapy Diagnosis:   Encounter Diagnoses   Name Primary?    Difficulty initiating walking Yes    Muscle weakness      Physician: Cb Warren MD    Visit Date: 2018  Physician Orders: PT Eval and Treat  Medical Diagnosis: Spinal Cord Injury with quadraparesis  Evaluation Date: 2018  Authorization Period Expiration: 18  Plan of Care Certification Period: 2018  Visit #/Visits authorized:     Time In: 10:00 AM   Time Out: 11:00 PM  Total Billable Time: 45 minutes    Precautions: Standard    Subjective      Pt reports: No new c/o's; states he has been using a cane to get around at home.   Response to previous treatment:Meek has been working at home to improve his overall movement.  He stated that he has been working on transfers, propelling his chair and getting his right arm and leg to move. He now has his hospital bed, still waiting on different wheelchair; also has not gotten brace (s) for hands.   Functional change: Meek stated that he was able to go up/down 4 steps to get into the house at home with assistance from 2 people;     Pain: 0/10  Location:  Denies any pain at present     Objective     Meek received therapeutic exercises to develop strength for 30 minutes includin. Seated LAQ's, and marching x 2 mins each;   2. Supine ball squeezes x 3 mins  3. Supine heel slides x 2 mins  4. Bridges x 20 reps  5. SAQ's x 3 mins  6. Sit to stand x 10  7. Nustep level 5 x 30 mins    Meek received the following manual therapy techniques:      Meek participated in neuromuscular re-education activities to improve: weight shifting, trunk stabilization; cross body movements  For 20  minutes. The following activities were included:  1. Forward/Backward/Side-Stepping in // bars with CGA x 1; concentrate on quad set on Right to stabilize his knee; clearing toe with  stepping by lifting at hip.   2. Toe-Taps on Green Step only - again - weight-shifting techniques with stabilization of Right knee and clearing toe on Right.   3. Unassisted standing: reaching with Left and Right Hand at various heights and distances to grasp cone - also performed across midline activities to work on rotation.       Meek participated in dynamic functional therapeutic activities to improve functional performance for   minutes, including:      Meek participated in gait training to improve functional mobility and safety for 15  minutes, including:  Gait training with Rolling Walker - required CGA x 1. Able to ambulate x 200ft; much improved with stance phase on right in terms of stability; able to lock knee in order to progress left foot forward;     Home Exercises Provided and Patient Education Provided     Education provided:   - discussed positive attitude needed when he goes for his assessment to IP Rehab; Meek was alittle hesitant about staying in [place], but discussed the benefits he would receive from participating in an IP Rehab Program.      Written Home Exercises Provided: .  Exercises were reviewed and Meek was able to demonstrate them prior to the end of the session.  Meek demonstrated good  understanding of the education provided.     Pt received a written copy of exercises to perform at home.    Meek demonstrated good  understanding of the education provided.     Assessment     Meek has made good progress with his ability to perform standing pivot transfers from last week.  He was able to perform sit<>stand with Min A;  Perform pivot transfer with CGA to/from mat.  Ambulation progression today using Rolling Walker.   Meek is progressing well towards his goals. He is demonstrating improvements in movement of RLE, improved ability to perform his transfers with CGA.once standing into chair. Improved control with toe taps.  Pt prognosis is Excellent.     Pt will continue to benefit  from skilled outpatient physical therapy to address the deficits listed in the problem list box on initial evaluation, provide pt/family education and to maximize pt's level of independence in the home and community environment.     Pt's spiritual, cultural and educational needs considered and pt agreeable to plan of care and goals.    Anticipated barriers to physical therapy: none identified at this time.     Goals:   In 6 weeks, pt. will:  1. Able to perform Supine <>Sit with CGA  2. Able to perform Sit <>Stand with Min A  3. Able to perform Standing Pivot Transfers with Mod A  4. Able to perform Static Standing unsupported x 1 min  Long Term GOALS:  In 12 weeks, pt. Will:  1. Able to perform Supine <>Sit Mod I  2. Able to perform Sit <>Stand Mod I  3. Able to perform standing pivot transfers Mod I  4. Able to ambulate with least restrictive device and CGA x 100ft  5.  be independent with HEP and SX management     Plan     Continue with Skilled Physical Thearpy 2x/week x 6 weeks for gait training; there exercise; neuro-re-education if patient does not get into the IP Rehab program.     Jonathan Favre, PTA

## 2018-07-06 ENCOUNTER — OFFICE VISIT (OUTPATIENT)
Dept: FAMILY MEDICINE | Facility: CLINIC | Age: 33
End: 2018-07-06
Payer: MEDICAID

## 2018-07-06 VITALS
DIASTOLIC BLOOD PRESSURE: 64 MMHG | RESPIRATION RATE: 20 BRPM | SYSTOLIC BLOOD PRESSURE: 114 MMHG | HEART RATE: 78 BPM | OXYGEN SATURATION: 96 %

## 2018-07-06 DIAGNOSIS — G95.11 SPINAL CORD ISCHEMIA CAUSING LOWER EXTREMITY PARAPARESIS: Primary | ICD-10-CM

## 2018-07-06 DIAGNOSIS — G82.20 SPINAL CORD ISCHEMIA CAUSING LOWER EXTREMITY PARAPARESIS: Primary | ICD-10-CM

## 2018-07-06 PROCEDURE — 99212 OFFICE O/P EST SF 10 MIN: CPT | Mod: PBBFAC,PN | Performed by: FAMILY MEDICINE

## 2018-07-06 PROCEDURE — 99213 OFFICE O/P EST LOW 20 MIN: CPT | Mod: S$PBB,,, | Performed by: FAMILY MEDICINE

## 2018-07-06 PROCEDURE — 99999 PR PBB SHADOW E&M-EST. PATIENT-LVL II: CPT | Mod: PBBFAC,,, | Performed by: FAMILY MEDICINE
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
## 2018-07-10 ENCOUNTER — CLINICAL SUPPORT (OUTPATIENT)
Dept: REHABILITATION | Facility: HOSPITAL | Age: 33
End: 2018-07-10
Attending: FAMILY MEDICINE
Payer: MEDICAID

## 2018-07-10 DIAGNOSIS — M62.81 MUSCLE WEAKNESS: Primary | ICD-10-CM

## 2018-07-10 DIAGNOSIS — R26.2 DIFFICULTY INITIATING WALKING: Primary | ICD-10-CM

## 2018-07-10 DIAGNOSIS — R29.898 BILATERAL LEG WEAKNESS: ICD-10-CM

## 2018-07-10 PROCEDURE — 97010 HOT OR COLD PACKS THERAPY: CPT | Mod: PN

## 2018-07-10 PROCEDURE — 97112 NEUROMUSCULAR REEDUCATION: CPT | Mod: PN

## 2018-07-10 PROCEDURE — 97530 THERAPEUTIC ACTIVITIES: CPT | Mod: PN

## 2018-07-10 PROCEDURE — 97110 THERAPEUTIC EXERCISES: CPT | Mod: PN

## 2018-07-10 NOTE — PROGRESS NOTES
Occupational Therapy Daily Treatment Note     Name: Meek Hanson  Clinic Number: [id number]    Therapy Diagnosis:   Encounter Diagnosis   Name Primary?    Muscle weakness Yes     Physician: Cb Warren MD    Visit Date: 7/10/2018  Physician Orders: OT treatment  Medical Diagnosis: SCI  Evaluation Date: 05/29/2018    Time In: 11:00  Time Out: 11:50  Total Billable Time:60 minutes     Precautions: Standard; Fall Risk    Subjective      Pt reports:  he received his new wheelchair yesterday  Response to previous treatment: positive  Functional change:  Patient continues to show improvement in strength in ambulation with assistance    Pain: no complaints  Location: neck     Objective     Meek received therapeutic exercises to develop strength, endurance, ROM, flexibility, posture and core stabilization for  minutes including:     UBE x 5 mins MIN resist  AROM B wrist EXT and FLEX with 2# dumbbell  AROM B forearm Sup/Pron with 2# dumbbell  AROM B ER with 2# dumbbell with increasing incoordination and difficulty RUE     FM coordination strengthening:   Extra light and light clothespins tree RUE  Extra light, Light, Medium clothespins tree LUE  B hand task bead stringing with  holding with R and placing bead on with L and pulling bead with R in pincer   Tennis ball activity (pacman) picking up beads L hand; unable to perform with R hand     OT provided positioning aide for R hand in functional resting position to improve R hand positioning and prevent contractures using section of pool noodle with velcro strapping and education provided to patient for wear at night. Patient did not get the splints recommended from [facility] due to financial restraints per mother.      Patient received heat to B shoulders to decrease stiffness in neck and shoulder areas.     Home Exercises Provided and Patient Education Provided     Education provided:   - BUE exercises AROM at home  with theraband. Issued yellow theraband with demo performed and verbalized understanding from patient received.  -Positioning aide provided and education given for wear R hesham Eden demonstrated good  understanding of the education provided.     Assessment       Meek is progressing well towards his goals. He is showing improvement in ambulation with assistance.   Pt prognosis is Good.     Pt will continue to benefit from skilled outpatient occupational therapy to maximize pt's level of independence in the home and community environment.     Pt's spiritual, cultural and educational needs considered and pt agreeable to plan of care and goals.    Anticipated barriers to occupational therapy: resources      Plan     Continue OT treatment. Patient reports not having received his splints or equipment from [facility] yet.      Ansley Lopez, OT

## 2018-07-10 NOTE — PROGRESS NOTES
Physical Therapy Daily Treatment Note     Name: Meek Hanson  Clinic Number: [id number]    Therapy Diagnosis:   Encounter Diagnoses   Name Primary?    Difficulty initiating walking Yes    Bilateral leg weakness      Physician: Cb Warren MD    Visit Date: 7/10/2018  Physician Orders: PT Eval and Treat  Medical Diagnosis: Spinal Cord Injury with quadraparesis  Evaluation Date: 2018  Authorization Period Expiration: 18  Plan of Care Certification Period: 2018  Visit #/Visits authorized:     Time In: 10:00 AM   Time Out: 11:00 PM  Total Billable Time: 45 minutes    Precautions: Standard    Subjective      Pt reports: No new c/o's; states he was able to get into the pool this past weekend, had some trouble getting out of it.    Response to previous treatment:Meek has been working at home to improve his overall movement.  He stated that he has been working on transfers, propelling his chair and getting his right arm and leg to move. He now has his hospital bed, still waiting on different wheelchair; also has not gotten brace (s) for hands.   Functional change: Meek stated that he was able to go up/down 4 steps to get into the house at home with assistance from 2 people;     Pain: 0/10  Location:  Denies any pain at present     Objective     Meek received therapeutic exercises to develop strength for 30 minutes includin. Seated LAQ's, and marching x 2 mins each;   2. Supine ball squeezes x 3 mins  3. Supine heel slides x 2 mins  4. Bridges x 20 reps  5. SAQ's x 3 mins  6. Sit to stand x 10  7. Nustep level 5 x 30 mins    Meek received the following manual therapy techniques:      Meek participated in neuromuscular re-education activities to improve: weight shifting, trunk stabilization; cross body movements  For 20  minutes. The following activities were included:  1. Forward/Backward/Side-Stepping in // bars with CGA x 1; concentrate on quad set on Right  to stabilize his knee; clearing toe with stepping by lifting at hip.   2. Toe-Taps on Green Step only - again - weight-shifting techniques with stabilization of Right knee and clearing toe on Right.   3. Unassisted standing: reaching with Left and Right Hand at various heights and distances to grasp cone - also performed across midline activities to work on rotation.       Meek participated in dynamic functional therapeutic activities to improve functional performance for   minutes, including:      Meek participated in gait training to improve functional mobility and safety for 10  minutes, including:  Gait training with Rolling Walker - required CGA x 1. Able to ambulate x 300ft; much improved with stance phase on right in terms of stability; able to lock knee in order to progress left foot forward;     Home Exercises Provided and Patient Education Provided     Education provided:   - discussed positive attitude needed when he goes for his assessment to IP Rehab; Meek was alittle hesitant about staying in [place], but discussed the benefits he would receive from participating in an IP Rehab Program.      Written Home Exercises Provided: .  Exercises were reviewed and Meek was able to demonstrate them prior to the end of the session.  Meek demonstrated good  understanding of the education provided.     Pt received a written copy of exercises to perform at home.    Meek demonstrated good  understanding of the education provided.     Assessment     Meek has made good progress with his ability to perform standing pivot transfers from last week.  He was able to perform sit<>stand with Min A;  Perform pivot transfer with CGA to/from mat.  Ambulation progression today using Rolling Walker.   Meek is progressing well towards his goals. He is demonstrating improvements in movement of RLE, improved ability to perform his transfers with CGA.once standing into chair. Improved control with toe taps.  Pt prognosis is  Excellent.     Pt will continue to benefit from skilled outpatient physical therapy to address the deficits listed in the problem list box on initial evaluation, provide pt/family education and to maximize pt's level of independence in the home and community environment.     Pt's spiritual, cultural and educational needs considered and pt agreeable to plan of care and goals.    Anticipated barriers to physical therapy: none identified at this time.     Goals:   In 6 weeks, pt. will:  1. Able to perform Supine <>Sit with CGA  2. Able to perform Sit <>Stand with Min A  3. Able to perform Standing Pivot Transfers with Mod A  4. Able to perform Static Standing unsupported x 1 min  Long Term GOALS:  In 12 weeks, pt. Will:  1. Able to perform Supine <>Sit Mod I  2. Able to perform Sit <>Stand Mod I  3. Able to perform standing pivot transfers Mod I  4. Able to ambulate with least restrictive device and CGA x 100ft  5.  be independent with HEP and SX management     Plan     Continue with Skilled Physical Thearpy 2x/week x 6 weeks for gait training; there exercise; neuro-re-education if patient does not get into the IP Rehab program.     Jonathan Favre, PTA

## 2018-07-12 ENCOUNTER — CLINICAL SUPPORT (OUTPATIENT)
Dept: REHABILITATION | Facility: HOSPITAL | Age: 33
End: 2018-07-12
Attending: FAMILY MEDICINE
Payer: MEDICAID

## 2018-07-12 DIAGNOSIS — M62.81 MUSCLE WEAKNESS: Primary | ICD-10-CM

## 2018-07-12 DIAGNOSIS — R26.2 DIFFICULTY INITIATING WALKING: Primary | ICD-10-CM

## 2018-07-12 DIAGNOSIS — M62.81 MUSCLE WEAKNESS: ICD-10-CM

## 2018-07-12 PROCEDURE — 97110 THERAPEUTIC EXERCISES: CPT | Mod: PN

## 2018-07-12 PROCEDURE — 97530 THERAPEUTIC ACTIVITIES: CPT | Mod: PN

## 2018-07-12 PROCEDURE — 97112 NEUROMUSCULAR REEDUCATION: CPT | Mod: PN

## 2018-07-12 NOTE — PROGRESS NOTES
Physical Therapy Daily Treatment Note     Name: Meek Hanson  Clinic Number: [id number]    Therapy Diagnosis:   Encounter Diagnoses   Name Primary?    Difficulty initiating walking Yes    Muscle weakness      Physician: Cb Warren MD    Visit Date: 2018  Physician Orders: PT Eval and Treat  Medical Diagnosis: Spinal Cord Injury with quadraparesis  Evaluation Date: 2018  Authorization Period Expiration: 18  Plan of Care Certification Period: 2018  Visit #/Visits authorized: 10 / 12    Time In: 10:10 AM   Time Out: 11:00 PM  Total Billable Time: 45 minutes    Precautions: Standard    Subjective      Pt reports: Patient reports some increased soreness in shoulders today.    Response to previous treatment:Meek has been working at home to improve his overall movement.  He stated that he has been working on transfers, propelling his chair and getting his right arm and leg to move. He now has his hospital bed, still waiting on different wheelchair; also has not gotten brace (s) for hands.   Functional change: Meek stated that he was able to go up/down 4 steps to get into the house at home with assistance from 2 people;     Pain: 5/10  Location:  Denies any pain at present     Objective     Meek received therapeutic exercises to develop strength for 30 minutes includin. Seated LAQ's, and marching x 2 mins each;   2. Supine ball squeezes x 3 mins  3. Supine heel slides x 2 mins  4. Bridges x 20 reps  5. SAQ's x 3 mins  6. Sit to stand x 10  7. Nustep level 5 x 15 mins    Meek received the following manual therapy techniques:      Meek participated in neuromuscular re-education activities to improve: weight shifting, trunk stabilization; cross body movements  For 20  minutes. The following activities were included:  1. Forward/Backward/Side-Stepping in // bars with CGA x 1; concentrate on quad set on Right to stabilize his knee; clearing toe with stepping by  lifting at hip.   2. Toe-Taps on Green Step only - again - weight-shifting techniques with stabilization of Right knee and clearing toe on Right.   3. Unassisted standing: reaching with Left and Right Hand at various heights and distances to grasp cone - also performed across midline activities to work on rotation.       Meek participated in dynamic functional therapeutic activities to improve functional performance for   minutes, including:      Meek participated in gait training to improve functional mobility and safety for 10  minutes, including:  Gait training with Rolling Walker - required CGA x 1. Able to ambulate x 400ft; much improved with stance phase on right in terms of stability; able to lock knee in order to progress left foot forward;     Home Exercises Provided and Patient Education Provided     Education provided:   - discussed positive attitude needed when he goes for his assessment to IP Rehab; Meek was alittle hesitant about staying in [place], but discussed the benefits he would receive from participating in an IP Rehab Program.      Written Home Exercises Provided: .  Exercises were reviewed and Meek was able to demonstrate them prior to the end of the session.  Meek demonstrated good  understanding of the education provided.     Pt received a written copy of exercises to perform at home.    Meek demonstrated good  understanding of the education provided.     Assessment     Meek has made good progress with his ability to perform standing pivot transfers from last week.  He was able to perform sit<>stand with Min A;  Perform pivot transfer with CGA to/from mat.  Ambulation progression today using Rolling Walker.   Meek is progressing well towards his goals. He is demonstrating improvements in movement of RLE, improved ability to perform his transfers with CGA.once standing into chair. Improved control with toe taps.  Pt prognosis is Excellent.     Pt will continue to benefit from skilled  outpatient physical therapy to address the deficits listed in the problem list box on initial evaluation, provide pt/family education and to maximize pt's level of independence in the home and community environment.     Pt's spiritual, cultural and educational needs considered and pt agreeable to plan of care and goals.    Anticipated barriers to physical therapy: none identified at this time.     Goals:   In 6 weeks, pt. will:  1. Able to perform Supine <>Sit with CGA  2. Able to perform Sit <>Stand with Min A  3. Able to perform Standing Pivot Transfers with Mod A  4. Able to perform Static Standing unsupported x 1 min  Long Term GOALS:  In 12 weeks, pt. Will:  1. Able to perform Supine <>Sit Mod I  2. Able to perform Sit <>Stand Mod I  3. Able to perform standing pivot transfers Mod I  4. Able to ambulate with least restrictive device and CGA x 100ft  5.  be independent with HEP and SX management     Plan     Continue with Skilled Physical Thearpy 2x/week x 6 weeks for gait training; there exercise; neuro-re-education if patient does not get into the IP Rehab program.     Jonathan Favre, PTA

## 2018-07-12 NOTE — PROGRESS NOTES
Occupational Therapy Daily Treatment Note     Name: Meek Hanson  Clinic Number: [id number]    Therapy Diagnosis:   Encounter Diagnosis   Name Primary?    Muscle weakness Yes     Physician: Cb Warren MD    Visit Date: 7/12/2018  Physician Orders: OT treatment  Medical Diagnosis: SCI  Evaluation Date: 05/29/2018    Time In: 11:00  Time Out: 11:50  Total Billable Time: 50 minutes     Precautions: Standard; Fall Risk    Subjective      Pt reports:  Feeling well   Response to previous treatment: positive  Functional change:  Patient continues to show improvement in strength in ambulation with assistance    Pain: no complaints  Location: neck     Objective     Meek received therapeutic exercises to develop strength, endurance, ROM, flexibility, posture and core stabilization for  minutes including:     UBE x 8 mins MIN resist  4# dowel in B elbow flex, chest press, FF, lateral rotation x 15  AROM B hand flex/ext; wrist ext; forearm sup x 20 each  B shoulder shrugs x 20     Neuromuscular Re-education  Dynamic sitting balance activities sitting on mat performing crossing midline BUE and grasping cones/stacking cones.      Home Exercises Provided and Patient Education Provided     Education provided:   - BUE exercises AROM at home with theraband. Issued yellow theraband with demo performed and verbalized understanding from patient received.  -Positioning aide provided and education given for wear R hesham Eden demonstrated good  understanding of the education provided.     Assessment       Meek is progressing well towards his goals. He is showing improvement in ambulation with assistance.   Pt prognosis is Good.     Pt will continue to benefit from skilled outpatient occupational therapy to maximize pt's level of independence in the home and community environment.     Pt's spiritual, cultural and educational needs considered and pt agreeable to plan of care and goals.    Anticipated  barriers to occupational therapy: resources      Plan     Continue OT treatment. Patient reports not having received his splints or equipment from [facility] yet.      Ansley Lopez, OT

## 2018-07-18 ENCOUNTER — TELEPHONE (OUTPATIENT)
Dept: FAMILY MEDICINE | Facility: CLINIC | Age: 33
End: 2018-07-18

## 2018-07-18 NOTE — TELEPHONE ENCOUNTER
Spoke with Lizeth at [facility] regarding patient's wheelchair and brace. She stated that [employer] is handling this account, however, it looks as though they have not yet received a DWO from this office; I explained that we have returned to them anything that was sent to us and that they have our correct fax number.  She is going to have [employer] return my call.  Josie

## 2018-07-23 ENCOUNTER — DOCUMENTATION ONLY (OUTPATIENT)
Dept: REHABILITATION | Facility: HOSPITAL | Age: 33
End: 2018-07-23

## 2018-07-23 NOTE — PT/OT/SLP PROGRESS
Progress Report for Meek Hanson:  Progress toward goals:    Meek has completed 10/12 initiall physical therapy visits; 2 visits missed secondary to weather and transportation.  He has been very compliant with his HEP and attendance in therapy.      CURRENT STATUS:  Meek is ambulating 400ft with use of RW and CGA x 1.  He is demonstrating much improved stance/stability on RLE with ability to lock his right knee in order to advance his left leg.     Transfers: Sit <>Stand with SBA and use of RW;    Supine <>Sit:  Mod I; Standing Pivot Transfers are SBA with use of RW.     Meek is able to actively initiate movement in his RLE against gravity now, but has less than 50% active movement at hip through knee against gravity.       PROGRESS TOWARD GOALS:    In 6 weeks, pt. Will:    1. Able to perform Supine <>Sit with CGA - goal achieved  2. Able to perform Sit <>Stand with Min A - goal achieved  3. Able to perform Standing Pivot Transfers with Mod A - goal achieved  4. Able to perform Static Standing unsupported x 1 min - goals achieved   Long Term GOALS:  In 12 weeks, pt. Will:  1. Able to perform Supine <>Sit Mod I - goal achieved   2. Able to perform Sit <>Stand Mod I - goal achieved   3. Able to perform standing pivot transfers Mod I  4. Able to ambulate with least restrictive device and CGA x 100ft -goal achieved with use of walker.   5.  be independent with HEP and SX management     New Goals:  6 weeks  1. Patient able to ambulate with use of straight cane x 400ft with SBA  2. Patient able to go up/down 4 steps with single railing with CGA  3. Patient able to perform all transfers Mod I  4. Patient independent with HEP and SX management     Recommend continuation of Skilled Physical Therapy 2x/week 6 weeks for gait, balance, strength and endurance training.

## 2018-07-24 ENCOUNTER — CLINICAL SUPPORT (OUTPATIENT)
Dept: REHABILITATION | Facility: HOSPITAL | Age: 33
End: 2018-07-24
Attending: FAMILY MEDICINE
Payer: MEDICAID

## 2018-07-24 DIAGNOSIS — M62.81 MUSCLE WEAKNESS: Primary | ICD-10-CM

## 2018-07-24 PROCEDURE — 97110 THERAPEUTIC EXERCISES: CPT | Mod: PN

## 2018-07-24 PROCEDURE — 97112 NEUROMUSCULAR REEDUCATION: CPT | Mod: PN

## 2018-07-24 NOTE — PROGRESS NOTES
Occupational Therapy Daily Treatment Note     Name: Meek Hanson  Clinic Number: [id number]    Therapy Diagnosis:   Encounter Diagnosis   Name Primary?    Muscle weakness Yes     Physician: Cb Warren MD    Visit Date: 2018  Physician Orders: OT treatment  Medical Diagnosis: SCI  Evaluation Date: 2018    Time In: 11:00  Time Out:1200  Total Billable Time: 60 minutes     Precautions: Standard; Fall Risk    Subjective      Pt reports:  He is doing well  Response to previous treatment: positive  Functional change:  Patient continues to show improvement in strength in ambulation with equipment    Pain: no complaints  Location: neck     Objective     Meek received therapeutic exercises to develop strength, endurance, ROM, flexibility, posture and core stabilization for  minutes includin# dowel in B elbow flex, chest press, FF, elevation, lateral rotation x 20  AROM B hand flex/ext  AROM B wrist EXT, FLEX, SUP x 10 each with 3# weight  FM coordination in pincer  L hand grasp/release of coins into slot  Clothespin tree performed BUE with all resist levels L and to yellow/red (extra light and light) with RUE and through compensatory  techniques     Neuromuscular Re-education  Dynamic sitting balance activities sitting on mat performing crossing midline BUE and grasping cones/stacking cones.  Nu-Step x 25 minutes with R hand requiring attachment for        Home Exercises Provided and Patient Education Provided     Education provided:   - BUE exercises AROM at home with theraband. Issued yellow theraband with demo performed and verbalized understanding from patient received.  -Positioning aide provided and education given for wear R hand  Meek demonstrated good  understanding of the education provided.     Assessment       Meek is progressing well towards his goals. He is showing improvement in ambulation with assistance.   Pt prognosis is Good.     Pt will  continue to benefit from skilled outpatient occupational therapy to maximize pt's level of independence in the home and community environment.     Pt's spiritual, cultural and educational needs considered and pt agreeable to plan of care and goals.    Anticipated barriers to occupational therapy: resources      Plan     Continue OT treatment. Patient reports not having received his splints or equipment from [facility] yet.      Ansley Lopez, OT

## 2018-07-26 ENCOUNTER — CLINICAL SUPPORT (OUTPATIENT)
Dept: REHABILITATION | Facility: HOSPITAL | Age: 33
End: 2018-07-26
Attending: FAMILY MEDICINE
Payer: MEDICAID

## 2018-07-26 DIAGNOSIS — R26.2 DIFFICULTY INITIATING WALKING: Primary | ICD-10-CM

## 2018-07-26 DIAGNOSIS — M62.81 MUSCLE WEAKNESS: Primary | ICD-10-CM

## 2018-07-26 DIAGNOSIS — M62.81 MUSCLE WEAKNESS: ICD-10-CM

## 2018-07-26 PROCEDURE — 97110 THERAPEUTIC EXERCISES: CPT | Mod: PN

## 2018-07-26 PROCEDURE — 97010 HOT OR COLD PACKS THERAPY: CPT | Mod: PN

## 2018-07-26 PROCEDURE — 97112 NEUROMUSCULAR REEDUCATION: CPT | Mod: PN

## 2018-07-26 NOTE — PROGRESS NOTES
Physical Therapy Daily Treatment Note     Name: Meek Hanson  Clinic Number: [id number]    Therapy Diagnosis:   Encounter Diagnoses   Name Primary?    Difficulty initiating walking Yes    Muscle weakness      Physician: Cb Warren MD    Visit Date: 2018  Physician Orders: PT Eval and Treat  Medical Diagnosis: Spinal Cord Injury with quadraparesis  Evaluation Date: 2018  Authorization Period Expiration: 18  Plan of Care Certification Period: 2018  Visit #/Visits authorized:     Time In: 10:00 AM   Time Out: 11:00  AM  Total Billable Time: 45 minutes    Precautions: Standard    Subjective      Pt reports: Patient reports that he fell Tuesday afternoon; no apparent injuries.   Response to previous treatment:Meek has been working at home to improve his overall movement.  He stated that he has been working on transfers, propelling his chair and getting his right arm and leg to move. He now has his hospital bed, still waiting on different wheelchair; also has not gotten brace (s) for hands.   Functional change: Meek stated that he was able to go up/down 4 steps to get into the house at home with assistance from 2 people;     Pain: 5/10  Location:  Denies any pain at present     Objective     Meek received therapeutic exercises to develop strength for 30 minutes includin. Seated LAQ's, and marching x 2 mins each;   2. Supine ball squeezes x 3 mins  3. Supine heel slides x 2 mins  4. Bridges x 20 reps  5. SAQ's x 3 mins  6. Sit to stand x 10  7. Nustep level 5 x 25 mins    Meek received the following manual therapy techniques:      Meek participated in neuromuscular re-education activities to improve: weight shifting, trunk stabilization; cross body movements  For 20  minutes. The following activities were included:  1. Forward/Backward/Side-Stepping in // bars with CGA x 1; concentrate on quad set on Right to stabilize his knee; clearing toe with  stepping by lifting at hip.   2. Toe-Taps on Green Step only - again - weight-shifting techniques with stabilization of Right knee and clearing toe on Right.   3. Unassisted standing: reaching with Left and Right Hand at various heights and distances to grasp cone - also performed across midline activities to work on rotation.       Meek participated in dynamic functional therapeutic activities to improve functional performance for   minutes, including:      Meek participated in gait training to improve functional mobility and safety for 10  minutes, including:  Gait training with Rolling Walker - required CGA x 1. Able to ambulate x 400ft; much improved with stance phase on right in terms of stability; able to lock knee in order to progress left foot forward;     Home Exercises Provided and Patient Education Provided     Education provided:   - discussed positive attitude needed when he goes for his assessment to IP Rehab; Meek was alittle hesitant about staying in [place], but discussed the benefits he would receive from participating in an IP Rehab Program.      Written Home Exercises Provided: .  Exercises were reviewed and Meek was able to demonstrate them prior to the end of the session.  Meek demonstrated good  understanding of the education provided.     Pt received a written copy of exercises to perform at home.    Meek demonstrated good  understanding of the education provided.     Assessment     Meek has made good progress with his ability to perform standing pivot transfers from last week.  He was able to perform sit<>stand with Min A;  Perform pivot transfer with CGA to/from mat.  Ambulation progression today using Rolling Walker.   Meek is progressing well towards his goals. He is demonstrating improvements in movement of RLE, improved ability to perform his transfers with CGA.once standing into chair. Improved control with toe taps.  Pt prognosis is Excellent.     Pt will continue to benefit  from skilled outpatient physical therapy to address the deficits listed in the problem list box on initial evaluation, provide pt/family education and to maximize pt's level of independence in the home and community environment.     Pt's spiritual, cultural and educational needs considered and pt agreeable to plan of care and goals.    Anticipated barriers to physical therapy: none identified at this time.     Goals:   In 6 weeks, pt. will:  1. Able to perform Supine <>Sit with CGA  2. Able to perform Sit <>Stand with Min A  3. Able to perform Standing Pivot Transfers with Mod A  4. Able to perform Static Standing unsupported x 1 min  Long Term GOALS:  In 12 weeks, pt. Will:  1. Able to perform Supine <>Sit Mod I  2. Able to perform Sit <>Stand Mod I  3. Able to perform standing pivot transfers Mod I  4. Able to ambulate with least restrictive device and CGA x 100ft  5.  be independent with HEP and SX management     Plan     Continue with Skilled Physical Thearpy 2x/week x 6 weeks for gait training; there exercise; neuro-re-education if patient does not get into the IP Rehab program.     Jonathan Favre, PTA

## 2018-07-26 NOTE — PROGRESS NOTES
Occupational Therapy Daily Treatment Note     Name: Meek Hanson  Clinic Number: [id number]    Therapy Diagnosis:   Encounter Diagnosis   Name Primary?    Muscle weakness Yes     Physician: Cb Warren MD    Visit Date: 7/26/2018  Physician Orders: OT treatment  Medical Diagnosis: SCI  Evaluation Date: 05/29/2018    Time In: 11:05  Time Out:12:00  Total Billable Time: 55 minutes     Precautions: Standard; Fall Risk    Subjective      Pt reports:  Sustaining a fall on Tuesday evening while on the porch attempting to dodge a wasp. He reports falling on his buttocks with residual soreness. No noted deficits or injuries.   Response to previous treatment: positive  Functional change:  Patient continues to show improvement in strength in ambulation with equipment    Pain: no complaints  Location: neck     Objective     Meek received therapeutic exercises to develop strength, endurance, ROM, flexibility, posture and core stabilization for  minutes including:     Heat application to neck and shoulders applied secondary to joint stiffness and discomfort.     4# dowel in B elbow flex, chest press, FF, elevation, lateral rotation x 20  PROM to B shoulders in FF and ER secondary to joint stiffness, increasingly in R versus L stiffness.   UBE performed x 8 minutes MIN resist   Pulleys in FF unable to perform full ROM above ~90 degrees   Sit to stand x 10 from EOM with SBA       Home Exercises Provided and Patient Education Provided     Education provided:   - BUE exercises AROM at home with theraband. Issued yellow theraband with demo performed and verbalized understanding from patient received.  -Positioning aide provided and education given for wear R hand  Meek demonstrated good  understanding of the education provided.     Assessment       Meek is progressing well towards his goals. He is showing improvement in ambulation with assistance.   Pt prognosis is Good.     Pt will continue to  benefit from skilled outpatient occupational therapy to maximize pt's level of independence in the home and community environment.     Pt's spiritual, cultural and educational needs considered and pt agreeable to plan of care and goals.    Anticipated barriers to occupational therapy: resources      Plan     Continue OT treatment. Patient reports not having received his splints or equipment from [facility] yet.      Ansley Lopez, OT

## 2018-08-02 ENCOUNTER — CLINICAL SUPPORT (OUTPATIENT)
Dept: REHABILITATION | Facility: HOSPITAL | Age: 33
End: 2018-08-02
Attending: FAMILY MEDICINE
Payer: MEDICAID

## 2018-08-02 DIAGNOSIS — M62.81 MUSCLE WEAKNESS: ICD-10-CM

## 2018-08-02 DIAGNOSIS — M62.81 MUSCLE WEAKNESS: Primary | ICD-10-CM

## 2018-08-02 DIAGNOSIS — R26.2 DIFFICULTY INITIATING WALKING: Primary | ICD-10-CM

## 2018-08-02 PROCEDURE — 97112 NEUROMUSCULAR REEDUCATION: CPT | Mod: PN

## 2018-08-02 PROCEDURE — 97110 THERAPEUTIC EXERCISES: CPT | Mod: PN

## 2018-08-02 PROCEDURE — 97530 THERAPEUTIC ACTIVITIES: CPT | Mod: PN

## 2018-08-02 NOTE — PROGRESS NOTES
Physical Therapy Daily Treatment Note     Name: Meek Hanson  Clinic Number: [id number]    Therapy Diagnosis:   Encounter Diagnoses   Name Primary?    Difficulty initiating walking Yes    Muscle weakness      Physician: Cb Warren MD    Visit Date: 2018  Physician Orders: PT Eval and Treat  Medical Diagnosis: Spinal Cord Injury with quadraparesis  Evaluation Date: 2018  Authorization Period Expiration: 18  Plan of Care Certification Period: 2018  Visit #/Visits authorized:     Time In: 9:45 AM   Time Out: 11:00  AM  Total Billable Time: 45 minutes    Precautions: Standard    Subjective      Pt reports: No new c/o's.    Response to previous treatment:Meek has been working at home to improve his overall movement.  He stated that he has been working on transfers, propelling his chair and getting his right arm and leg to move. He now has his hospital bed, still waiting on different wheelchair; also has not gotten brace (s) for hands.   Functional change: Meek stated that he was able to go up/down 4 steps to get into the house at home with assistance from 2 people;     Pain: 4/10  Location:  Denies any pain at present     Objective     Meek received therapeutic exercises to develop strength for 30 minutes includin. Seated LAQ's, and marching x 2 mins each;   2. Supine ball squeezes x 3 mins  3. Supine heel slides x 2 mins  4. Bridges x 20 reps  5. SAQ's x 3 mins  6. Sit to stand x 10  7. Nustep level 5 x 25 mins    Meek received the following manual therapy techniques:      Meek participated in neuromuscular re-education activities to improve: weight shifting, trunk stabilization; cross body movements  For 20  minutes. The following activities were included:  1. Forward/Backward/Side-Stepping in // bars with CGA x 1; concentrate on quad set on Right to stabilize his knee; clearing toe with stepping by lifting at hip.   2. Toe-Taps on Green Step  -  again - weight-shifting techniques with stabilization of Right knee and clearing toe on Right.   3. Unassisted standing: reaching with Left and Right Hand at various heights and distances to grasp cone - also performed across midline activities to work on rotation.       Meek participated in dynamic functional therapeutic activities to improve functional performance for   minutes, including:      Meek participated in gait training to improve functional mobility and safety for 10  minutes, including:  Gait training with Rolling Walker - required CGA x 1. Able to ambulate x 400ft; much improved with stance phase on right in terms of stability; able to lock knee in order to progress left foot forward;     Home Exercises Provided and Patient Education Provided     Education provided:   - discussed positive attitude needed when he goes for his assessment to IP Rehab; Meek was alittle hesitant about staying in [place], but discussed the benefits he would receive from participating in an IP Rehab Program.      Written Home Exercises Provided: .  Exercises were reviewed and Meek was able to demonstrate them prior to the end of the session.  Meek demonstrated good  understanding of the education provided.     Pt received a written copy of exercises to perform at home.    Meek demonstrated good  understanding of the education provided.     Assessment     Meek has made good progress with his ability to perform standing pivot transfers from last week.  He was able to perform sit<>stand with Min A;  Perform pivot transfer with CGA to/from mat.  Ambulation progression today using Rolling Walker.   Meek is progressing well towards his goals. He is demonstrating improvements in movement of RLE, improved ability to perform his transfers with CGA.once standing into chair. Improved control with toe taps.  Pt prognosis is Excellent.     Pt will continue to benefit from skilled outpatient physical therapy to address the deficits  listed in the problem list box on initial evaluation, provide pt/family education and to maximize pt's level of independence in the home and community environment.     Pt's spiritual, cultural and educational needs considered and pt agreeable to plan of care and goals.    Anticipated barriers to physical therapy: none identified at this time.     Goals:   In 6 weeks, pt. will:  1. Able to perform Supine <>Sit with CGA  2. Able to perform Sit <>Stand with Min A  3. Able to perform Standing Pivot Transfers with Mod A  4. Able to perform Static Standing unsupported x 1 min  Long Term GOALS:  In 12 weeks, pt. Will:  1. Able to perform Supine <>Sit Mod I  2. Able to perform Sit <>Stand Mod I  3. Able to perform standing pivot transfers Mod I  4. Able to ambulate with least restrictive device and CGA x 100ft  5.  be independent with HEP and SX management     Plan     Continue with Skilled Physical Thearpy 2x/week x 6 weeks for gait training; there exercise; neuro-re-education if patient does not get into the IP Rehab program.     Jonathan Favre, PTA

## 2018-08-02 NOTE — PROGRESS NOTES
Occupational Therapy Daily Treatment Note     Name: Meek Hanson  Clinic Number: [id number]    Therapy Diagnosis:   Encounter Diagnosis   Name Primary?    Muscle weakness Yes     Physician: Cb Warren MD    Visit Date: 2018  Physician Orders: OT treatment  Medical Diagnosis: SCI  Evaluation Date: 2018    Time In: 11:00  Time Out:12:00  Total Billable Time: 55 minutes     Precautions: Standard; Fall Risk    Subjective      Pt reports:  Doing well  Response to previous treatment: positive  Functional change:  Patient continues to show improvement in strength in ambulation with equipment    Pain: no complaints  Location: neck     Objective     Meek received therapeutic exercises to develop strength, endurance, ROM, flexibility, posture and core stabilization for  minutes includin# dowel in B elbow flex, chest press, FF, elevation, lateral rotation x 20  PROM to B shoulders in FF and ER secondary to joint stiffness, increasingly in R versus L stiffness.   UBE performed x 10 minutes MIN resist    Sit to stand x 10 from EOM with SBA   FM coordination/strengthening utilizing graded resist clothespins BUE        Home Exercises Provided and Patient Education Provided     Education provided:   - BUE exercises AROM at home with theraband. Issued yellow theraband with demo performed and verbalized understanding from patient received.  -Positioning aide provided and education given for wear R hand  Meek demonstrated good  understanding of the education provided.     Assessment       Meek is progressing well towards his goals. He is showing improvement in ambulation with assistance.   Pt prognosis is Good.     Pt will continue to benefit from skilled outpatient occupational therapy to maximize pt's level of independence in the home and community environment.     Pt's spiritual, cultural and educational needs considered and pt agreeable to plan of care and  goals.    Anticipated barriers to occupational therapy: resources      Plan     Continue OT treatment. Patient reports not having received his splints or equipment from [facility] yet.      Ansley Lopez, OT

## 2018-08-07 ENCOUNTER — CLINICAL SUPPORT (OUTPATIENT)
Dept: REHABILITATION | Facility: HOSPITAL | Age: 33
End: 2018-08-07
Attending: FAMILY MEDICINE
Payer: MEDICAID

## 2018-08-07 DIAGNOSIS — M62.81 MUSCLE WEAKNESS: ICD-10-CM

## 2018-08-07 DIAGNOSIS — R26.2 DIFFICULTY INITIATING WALKING: Primary | ICD-10-CM

## 2018-08-07 DIAGNOSIS — M62.81 MUSCLE WEAKNESS: Primary | ICD-10-CM

## 2018-08-07 PROCEDURE — 97110 THERAPEUTIC EXERCISES: CPT | Mod: PN

## 2018-08-07 PROCEDURE — 97010 HOT OR COLD PACKS THERAPY: CPT | Mod: PN

## 2018-08-07 PROCEDURE — 97112 NEUROMUSCULAR REEDUCATION: CPT | Mod: PN

## 2018-08-07 NOTE — PROGRESS NOTES
Occupational Therapy Daily Treatment Note     Name: Meek Hanson  Clinic Number: [id number]    Therapy Diagnosis:   Encounter Diagnosis   Name Primary?    Muscle weakness Yes     Physician: Cb Warren MD    Visit Date: 8/7/2018  Physician Orders: OT treatment  Medical Diagnosis: SCI  Evaluation Date: 05/29/2018    Time In: 11:00  Time Out:12:00  Total Billable Time: 55 minutes     Precautions: Standard; Fall Risk    Subjective      Pt reports:  Doing well  Response to previous treatment: positive  Functional change:  Patient continues to show improvement    Pain: some soreness in shoulders and neck  Location: neck     Objective   Heat application to B shoulders prior to stretching of BUE's    Meek received therapeutic exercises to develop strength, endurance, ROM, flexibility, posture and core stabilization for  minutes including:     BUE shoulder stretches in PROM overhead in FF and ER as tolerated Increased stiffness in R compared to L.   3# dowel in B elbow flex, chest press, FF, elevation, lateral rotation x 20   UBE performed x 10 minutes MIN resist    3# dumbbell B elbow flexion and L wrist extension with support required of weight in R wrist extension x 10 each    Home Exercises Provided and Patient Education Provided     Education provided:   - BUE exercises AROM at home with theraband. Issued yellow theraband with demo performed and verbalized understanding from patient received.  -Positioning aide provided and education given for wear R hand  Meek demonstrated good  understanding of the education provided.     Assessment       Meek is progressing well towards his goals. He is showing improvement in ambulation with assistance.   Pt prognosis is Good.     Pt will continue to benefit from skilled outpatient occupational therapy to maximize pt's level of independence in the home and community environment.     Pt's spiritual, cultural and educational needs considered and pt  agreeable to plan of care and goals.    Anticipated barriers to occupational therapy: resources      Plan     Continue OT treatment. Patient reports not having received his splints or equipment from [facility] yet.      Ansley Lopez, OT

## 2018-08-07 NOTE — PROGRESS NOTES
Physical Therapy Daily Treatment Note     Name: Meek Hanson  Clinic Number: [id number]    Therapy Diagnosis:   Encounter Diagnoses   Name Primary?    Difficulty initiating walking Yes    Muscle weakness      Physician: Cb Warren MD    Visit Date: 2018  Physician Orders: PT Eval and Treat  Medical Diagnosis: Spinal Cord Injury with quadraparesis  Evaluation Date: 2018  Authorization Period Expiration: 18  Plan of Care Certification Period: 2018  Visit #/Visits authorized:     Time In: 9:50 AM   Time Out: 11:00  AM  Total Billable Time: 45 minutes    Precautions: Standard    Subjective      Pt reports: No new c/o's.    Response to previous treatment:Meek has been working at home to improve his overall movement.  He stated that he has been working on transfers, propelling his chair and getting his right arm and leg to move;  also has not gotten brace (s) for hands.   Functional change: Meek stated that he was able to go up/down 4 steps to get into the house at home with assistance from 2 people;     Pain: 4/10  Location:  Denies any pain at present     Objective     Meek received therapeutic exercises to develop strength for 30 minutes includin. Seated LAQ's, and marching x 2 mins each;   2. Supine ball squeezes x 3 mins  3. Supine heel slides x 2 mins  4. Bridges x 20 reps  5. SAQ's x 3 mins  6. Sit to stand x 10  7. Nustep level 5 x 20 mins    Meek received the following manual therapy techniques:      Meek participated in neuromuscular re-education activities to improve: weight shifting, trunk stabilization; cross body movements  For 20  minutes. The following activities were included:  1. Forward/Backward/Side-Stepping in // bars with CGA x 1; concentrate on quad set on Right to stabilize his knee; clearing toe with stepping by lifting at hip.   2. Toe-Taps on Green Step  - again - weight-shifting techniques with stabilization of Right knee  and clearing toe on Right.   3. Unassisted standing: reaching with Left and Right Hand at various heights and distances to grasp cone - also performed across midline activities to work on rotation.       Meek participated in dynamic functional therapeutic activities to improve functional performance for   minutes, including:      Meek participated in gait training to improve functional mobility and safety for 10  minutes, including:  Gait training with Rolling Walker - required CGA x 1. Able to ambulate x 400ft; much improved with stance phase on right in terms of stability; able to lock knee in order to progress left foot forward;     Home Exercises Provided and Patient Education Provided     Education provided:   - discussed positive attitude needed when he goes for his assessment to IP Rehab; Meek was alittle hesitant about staying in [place], but discussed the benefits he would receive from participating in an IP Rehab Program.      Written Home Exercises Provided: .  Exercises were reviewed and Meek was able to demonstrate them prior to the end of the session.  Meek demonstrated good  understanding of the education provided.     Pt received a written copy of exercises to perform at home.    Meek demonstrated good  understanding of the education provided.     Assessment     Meek has made good progress with his ability to perform standing pivot transfers from last week.  He was able to perform sit<>stand with Min A;  Perform pivot transfer with CGA to/from mat.  Ambulation progression today using Rolling Walker.   Meek is progressing well towards his goals. He is demonstrating improvements in movement of RLE, improved ability to perform his transfers with CGA.once standing into chair. Improved control with toe taps.  Pt prognosis is Excellent.     Pt will continue to benefit from skilled outpatient physical therapy to address the deficits listed in the problem list box on initial evaluation, provide  pt/family education and to maximize pt's level of independence in the home and community environment.     Pt's spiritual, cultural and educational needs considered and pt agreeable to plan of care and goals.    Anticipated barriers to physical therapy: none identified at this time.     Goals:   In 6 weeks, pt. will:  1. Able to perform Supine <>Sit with CGA  2. Able to perform Sit <>Stand with Min A  3. Able to perform Standing Pivot Transfers with Mod A  4. Able to perform Static Standing unsupported x 1 min  Long Term GOALS:  In 12 weeks, pt. Will:  1. Able to perform Supine <>Sit Mod I  2. Able to perform Sit <>Stand Mod I  3. Able to perform standing pivot transfers Mod I  4. Able to ambulate with least restrictive device and CGA x 100ft  5.  be independent with HEP and SX management     Plan     Continue with Skilled Physical Thearpy 2x/week x 6 weeks for gait training; there exercise; neuro-re-education if patient does not get into the IP Rehab program.     Jonathan Favre, PTA

## 2018-08-09 ENCOUNTER — CLINICAL SUPPORT (OUTPATIENT)
Dept: REHABILITATION | Facility: HOSPITAL | Age: 33
End: 2018-08-09
Attending: FAMILY MEDICINE
Payer: MEDICAID

## 2018-08-09 ENCOUNTER — OFFICE VISIT (OUTPATIENT)
Dept: FAMILY MEDICINE | Facility: CLINIC | Age: 33
End: 2018-08-09
Payer: MEDICAID

## 2018-08-09 VITALS
HEIGHT: 69 IN | RESPIRATION RATE: 18 BRPM | WEIGHT: 238 LBS | OXYGEN SATURATION: 97 % | DIASTOLIC BLOOD PRESSURE: 67 MMHG | BODY MASS INDEX: 35.25 KG/M2 | SYSTOLIC BLOOD PRESSURE: 130 MMHG | HEART RATE: 85 BPM

## 2018-08-09 DIAGNOSIS — M62.81 MUSCLE WEAKNESS: Primary | ICD-10-CM

## 2018-08-09 DIAGNOSIS — R21 RASH: ICD-10-CM

## 2018-08-09 DIAGNOSIS — F51.01 PRIMARY INSOMNIA: Primary | ICD-10-CM

## 2018-08-09 PROCEDURE — 97150 GROUP THERAPEUTIC PROCEDURES: CPT | Mod: PN

## 2018-08-09 PROCEDURE — 99213 OFFICE O/P EST LOW 20 MIN: CPT | Mod: PBBFAC,PN | Performed by: FAMILY MEDICINE

## 2018-08-09 PROCEDURE — 99999 PR PBB SHADOW E&M-EST. PATIENT-LVL III: CPT | Mod: PBBFAC,,, | Performed by: FAMILY MEDICINE

## 2018-08-09 PROCEDURE — 97112 NEUROMUSCULAR REEDUCATION: CPT | Mod: PN

## 2018-08-09 PROCEDURE — 99214 OFFICE O/P EST MOD 30 MIN: CPT | Mod: S$PBB,,, | Performed by: FAMILY MEDICINE

## 2018-08-09 PROCEDURE — 97110 THERAPEUTIC EXERCISES: CPT | Mod: PN

## 2018-08-09 PROCEDURE — 97010 HOT OR COLD PACKS THERAPY: CPT | Mod: PN

## 2018-08-09 RX ORDER — TRAZODONE HYDROCHLORIDE 50 MG/1
TABLET ORAL
Qty: 60 TABLET | Refills: 11 | Status: SHIPPED | OUTPATIENT
Start: 2018-08-09 | End: 2018-12-24

## 2018-08-09 RX ORDER — MUPIROCIN 20 MG/G
OINTMENT TOPICAL 3 TIMES DAILY
Qty: 30 G | Refills: 5 | Status: SHIPPED | OUTPATIENT
Start: 2018-08-09 | End: 2019-02-19 | Stop reason: SDUPTHER

## 2018-08-09 RX ORDER — NYSTATIN 100000 [USP'U]/G
POWDER TOPICAL 2 TIMES DAILY
Qty: 60 G | Refills: 2 | Status: SHIPPED | OUTPATIENT
Start: 2018-08-09 | End: 2023-07-02

## 2018-08-09 NOTE — PROGRESS NOTES
Occupational Therapy Daily Treatment Note     Name: Meek Hanson  Clinic Number: [id number]    Therapy Diagnosis:   Encounter Diagnosis   Name Primary?    Muscle weakness Yes     Physician: Cb Warren MD    Visit Date: 2018  Physician Orders: OT treatment  Medical Diagnosis: SCI  Evaluation Date: 2018    Time In: 10:15  Time Out:1130  Total Billable Time: 75 minutes     Precautions: Standard; Fall Risk    Subjective      Pt reports:  Doing well  Response to previous treatment: positive  Functional change:  Patient continues to show improvement    Pain: some soreness in shoulders and neck  Location: neck     Objective       Meek received therapeutic exercises to develop strength, endurance, ROM, flexibility, posture and core stabilization for  minutes includin# dowel in B elbow flex, chest press, FF, lateral rotation 2 x 12   UBE performed x 8 minutes MIN resist requiring velcro strap for R     Meek participated in therapeutic activity consisting of:  FM strengthening and coordination using LUE in graded resist clothespin tree all levels and RUE completing extra light and light levels   Peg activity using RUE in compensatory  placing pegs on board  FM task RUE pincer  with deficits due to limitations, performing grasp of geometric shape and placing into matching cut-out  FM activity LUE using resistive tongs to place small geometric shapes into matching cut-outs    Heat application to B shoulders prior to stretching of BUE's    Meek performed neuromuscular reeducation consisting of:  Nu-Step x 20 minutes level 5 requiring R attachment for .,     Home Exercises Provided and Patient Education Provided     Education provided:   - BUE exercises AROM at home with theraband. Issued yellow theraband with demo performed and verbalized understanding from patient received.  -Positioning aide provided and education given for wear R hand  Meek demonstrated  good  understanding of the education provided.     Assessment       Meek is progressing well towards his goals. He is showing improvement in ambulation with assistance.   Pt prognosis is Good.     Pt will continue to benefit from skilled outpatient occupational therapy to maximize pt's level of independence in the home and community environment.     Pt's spiritual, cultural and educational needs considered and pt agreeable to plan of care and goals.    Anticipated barriers to occupational therapy: resources      Plan     Continue OT treatment. Patient reports not having received his splints or equipment from [facility] yet.      Ansley Lopez, OT
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
## 2018-08-14 ENCOUNTER — CLINICAL SUPPORT (OUTPATIENT)
Dept: REHABILITATION | Facility: HOSPITAL | Age: 33
End: 2018-08-14
Attending: FAMILY MEDICINE
Payer: MEDICAID

## 2018-08-14 DIAGNOSIS — M62.81 MUSCLE WEAKNESS: ICD-10-CM

## 2018-08-14 DIAGNOSIS — R26.2 DIFFICULTY INITIATING WALKING: ICD-10-CM

## 2018-08-14 DIAGNOSIS — R26.2 DIFFICULTY INITIATING WALKING: Primary | ICD-10-CM

## 2018-08-14 DIAGNOSIS — M62.81 MUSCLE WEAKNESS: Primary | ICD-10-CM

## 2018-08-14 PROCEDURE — 97530 THERAPEUTIC ACTIVITIES: CPT | Mod: PN

## 2018-08-14 PROCEDURE — 97110 THERAPEUTIC EXERCISES: CPT | Mod: PN

## 2018-08-14 PROCEDURE — 97010 HOT OR COLD PACKS THERAPY: CPT | Mod: PN

## 2018-08-14 PROCEDURE — 97112 NEUROMUSCULAR REEDUCATION: CPT | Mod: PN

## 2018-08-14 NOTE — PROGRESS NOTES
Physical Therapy Daily Treatment Note     Name: Meek Hanson  Clinic Number: [id number]    Therapy Diagnosis:   Encounter Diagnoses   Name Primary?    Difficulty initiating walking Yes    Muscle weakness      Physician: Cb Warren MD    Visit Date: 2018  Physician Orders: PT Eval and Treat  Medical Diagnosis: Spinal Cord Injury with quadraparesis  Evaluation Date: 2018  Authorization Period Expiration: 18  Plan of Care Certification Period: 2018  Visit #/Visits authorized:     Time In: 10:00 AM   Time Out: 11:00  AM  Total Billable Time: 45 minutes    Precautions: Standard    Subjective      Pt reports: No new c/o's.    Response to previous treatment:Meek has been working at home to improve his overall movement.  He stated that he has been working on transfers, propelling his chair and getting his right arm and leg to move;  also has not gotten brace (s) for hands.   Functional change: Meek stated that he was able to go up/down 4 steps to get into the house at home with assistance from 2 people;     Pain: 4/10  Location:  Denies any pain at present     Objective     Meek received therapeutic exercises to develop strength for 30 minutes includin. Seated LAQ's, and marching x 2 mins each;   2. Supine ball squeezes x 3 mins  3. Supine heel slides x 2 mins  4. Bridges x 20 reps  5. SAQ's x 3 mins  6. Sit to stand x 10  7. Nustep level 5 x 20 mins    Meek received the following manual therapy techniques:      Meek participated in neuromuscular re-education activities to improve: weight shifting, trunk stabilization; cross body movements  For 20  minutes. The following activities were included:  1. Forward/Backward/Side-Stepping in // bars with CGA x 1; concentrate on quad set on Right to stabilize his knee; clearing toe with stepping by lifting at hip.   2. Toe-Taps on Green Step  - again - weight-shifting techniques with stabilization of Right  knee and clearing toe on Right.   3. Unassisted standing: reaching with Left and Right Hand at various heights and distances to grasp cone - also performed across midline activities to work on rotation.       Meek participated in dynamic functional therapeutic activities to improve functional performance for   minutes, including:      Meek participated in gait training to improve functional mobility and safety for 10  minutes, including:  Gait training with Rolling Walker - required CGA x 1. Able to ambulate x 400ft; much improved with stance phase on right in terms of stability; able to lock knee in order to progress left foot forward;     Home Exercises Provided and Patient Education Provided     Education provided:   - discussed positive attitude needed when he goes for his assessment to IP Rehab; Meek was alittle hesitant about staying in [place], but discussed the benefits he would receive from participating in an IP Rehab Program.      Written Home Exercises Provided: .  Exercises were reviewed and Meek was able to demonstrate them prior to the end of the session.  Meek demonstrated good  understanding of the education provided.     Pt received a written copy of exercises to perform at home.    Meek demonstrated good  understanding of the education provided.     Assessment     Meek has made good progress with his ability to perform standing pivot transfers from last week.  He was able to perform sit<>stand with Min A;  Perform pivot transfer with CGA to/from mat.  Ambulation progression today using Rolling Walker.   Meek is progressing well towards his goals. He is demonstrating improvements in movement of RLE, improved ability to perform his transfers with CGA.once standing into chair. Improved control with toe taps.  Pt prognosis is Excellent.     Pt will continue to benefit from skilled outpatient physical therapy to address the deficits listed in the problem list box on initial evaluation, provide  pt/family education and to maximize pt's level of independence in the home and community environment.     Pt's spiritual, cultural and educational needs considered and pt agreeable to plan of care and goals.    Anticipated barriers to physical therapy: none identified at this time.     Goals:   In 6 weeks, pt. will:  1. Able to perform Supine <>Sit with CGA  2. Able to perform Sit <>Stand with Min A  3. Able to perform Standing Pivot Transfers with Mod A  4. Able to perform Static Standing unsupported x 1 min  Long Term GOALS:  In 12 weeks, pt. Will:  1. Able to perform Supine <>Sit Mod I  2. Able to perform Sit <>Stand Mod I  3. Able to perform standing pivot transfers Mod I  4. Able to ambulate with least restrictive device and CGA x 100ft  5.  be independent with HEP and SX management     Plan     Continue with Skilled Physical Thearpy 2x/week x 6 weeks for gait training; there exercise; neuro-re-education if patient does not get into the IP Rehab program.     Jonathan Favre, PTA

## 2018-08-14 NOTE — PROGRESS NOTES
"                           Occupational Therapy Daily Treatment Note     Name: Meek Hanson  Clinic Number: [id number]    Therapy Diagnosis:   Encounter Diagnoses   Name Primary?    Muscle weakness Yes    Difficulty initiating walking      Physician: Cb Warren MD    Visit Date: 2018  Physician Orders: OT treatment  Medical Diagnosis: SCI  Evaluation Date: 2018    Time In: 1100  Time Out:1200  Total Billable Time: 60 minutes     Precautions: Standard; Fall Risk    Subjective      Pt reports:  Sleeping well last night. He reports MD issued a prescription on Thursday for a sleeping medication. He reports MD also adjusted spasm medication from PRN to 2 x per day now. He reports it is helping "somewhat." "I don't do the tensing up and spasm[ing} out as much."  Response to previous treatment: positive  Functional change:  Patient continues to make progress.     Pain:  soreness in shoulders and neck pain level 4/10 reported  Location: neck     Objective       Meek received therapeutic exercises to develop strength, endurance, ROM, flexibility, posture and core stabilization for  minutes includin# dowel in B elbow flex, chest press, elevation, FF, lateral rotation x 15  UBE performed x 10 minutes MIN-MOD resist requiring velcro strap for R   Light digi flex L hand x 15  PROM R and L digits in extension at PIP's    Meek participated in therapeutic activity consisting of:  FM strengthening BUE in bolts/screws activity for improved in hand manipulation of small objects.   FM strengthening and coordination using LUE in graded resist clothespin tree all levels and RUE completing extra light and light levels   Peg activity using RUE in compensatory  placing pegs on board    Meek participated in neuromuscular reeducation consisting of dynamic sitting/crossing midline BUE/grasp of cones through compensatory  due to FM weakness/limitations/stacking cones. Patient demonstrates Good - dynamic " balance.    Heat application to B shoulders secondary to compaints of soreness/tightness    Meek performed neuromuscular reeducation consisting of:  Nu-Step x 20 minutes level 5 requiring R attachment for .,     Home Exercises Provided and Patient Education Provided     Education provided:   - BUE exercises AROM at home with theraband. Issued yellow theraband with demo performed and verbalized understanding from patient received.  -Positioning aide provided and education given for wear R hand  Meek demonstrated good  understanding of the education provided.     Assessment   Patient has greater deficits in RUE coordination and function compared to LUE. Patient has increasing limitations at cervical level R compared to L. Patient depends on tenodesis for R  and is limited to C6 level. Patient has incoordination of LUE but demonstrates some function at C7 and C8.    Meek is progressing well towards his goals. He is showing improvement in ambulation with assistance.   Pt prognosis is Good.     Pt will continue to benefit from skilled outpatient occupational therapy to maximize pt's level of independence in the home and community environment.     Pt's spiritual, cultural and educational needs considered and pt agreeable to plan of care and goals.    Anticipated barriers to occupational therapy: resources      Plan     Continue OT treatment.      Ansley Lopez, OT

## 2018-08-16 ENCOUNTER — CLINICAL SUPPORT (OUTPATIENT)
Dept: REHABILITATION | Facility: HOSPITAL | Age: 33
End: 2018-08-16
Attending: FAMILY MEDICINE
Payer: MEDICAID

## 2018-08-16 DIAGNOSIS — M62.81 MUSCLE WEAKNESS: ICD-10-CM

## 2018-08-16 DIAGNOSIS — R26.2 DIFFICULTY INITIATING WALKING: Primary | ICD-10-CM

## 2018-08-16 DIAGNOSIS — R26.2 DIFFICULTY INITIATING WALKING: ICD-10-CM

## 2018-08-16 DIAGNOSIS — M62.81 MUSCLE WEAKNESS: Primary | ICD-10-CM

## 2018-08-16 PROCEDURE — 97010 HOT OR COLD PACKS THERAPY: CPT | Mod: PN

## 2018-08-16 PROCEDURE — 97112 NEUROMUSCULAR REEDUCATION: CPT | Mod: PN

## 2018-08-16 PROCEDURE — 97110 THERAPEUTIC EXERCISES: CPT | Mod: PN

## 2018-08-16 PROCEDURE — 97530 THERAPEUTIC ACTIVITIES: CPT | Mod: PN

## 2018-08-16 NOTE — PROGRESS NOTES
Physical Therapy Daily Treatment Note     Name: Meek Hanson  Clinic Number: [id number]    Therapy Diagnosis:   Encounter Diagnoses   Name Primary?    Difficulty initiating walking Yes    Muscle weakness      Physician: Cb Warren MD    Visit Date: 2018  Physician Orders: PT Eval and Treat  Medical Diagnosis: Spinal Cord Injury with quadraparesis  Evaluation Date: 2018  Authorization Period Expiration: 18  Plan of Care Certification Period: 2018  Visit #/Visits authorized: 15 / 16    Time In: 10:00 AM   Time Out: 11:00  AM  Total Billable Time: 45 minutes    Precautions: Standard    Subjective      Pt reports: No new c/o's.    Response to previous treatment:Meek has been working at home to improve his overall movement.  He stated that he has been working on transfers, propelling his chair and getting his right arm and leg to move;  also has not gotten brace (s) for hands.   Functional change: Meek stated that he was able to go up/down 4 steps to get into the house at home with assistance from 2 people;     Pain: 4/10  Location:  Denies any pain at present     Objective     Meek received therapeutic exercises to develop strength for 30 minutes includin. Seated LAQ's, and marching x 2 mins each;   2. Supine ball squeezes x 3 mins  3. Supine heel slides x 2 mins  4. Bridges x 20 reps  5. SAQ's x 3 mins  6. Sit to stand x 10  7. Nustep level 5 x 20 mins    Meek received the following manual therapy techniques:      Meek participated in neuromuscular re-education activities to improve: weight shifting, trunk stabilization; cross body movements  For 20  minutes. The following activities were included:  1. Forward/Backward/Side-Stepping in // bars with CGA x 1; concentrate on quad set on Right to stabilize his knee; clearing toe with stepping by lifting at hip.   2. Toe-Taps on Green Step  - again - weight-shifting techniques with stabilization of Right  knee and clearing toe on Right.   3. Unassisted standing: reaching with Left and Right Hand at various heights and distances to grasp cone - also performed across midline activities to work on rotation.       Meek participated in dynamic functional therapeutic activities to improve functional performance for   minutes, including:      Meek participated in gait training to improve functional mobility and safety for 10  minutes, including:  Gait training with Rolling Walker - required CGA x 1. Able to ambulate x 400ft; much improved with stance phase on right in terms of stability; able to lock knee in order to progress left foot forward;     Home Exercises Provided and Patient Education Provided     Education provided:   - discussed positive attitude needed when he goes for his assessment to IP Rehab; Meek was alittle hesitant about staying in [place], but discussed the benefits he would receive from participating in an IP Rehab Program.      Written Home Exercises Provided: .  Exercises were reviewed and Meek was able to demonstrate them prior to the end of the session.  Meek demonstrated good  understanding of the education provided.     Pt received a written copy of exercises to perform at home.    Meek demonstrated good  understanding of the education provided.     Assessment     Meek has made good progress with his ability to perform standing pivot transfers from last week.  He was able to perform sit<>stand with Min A;  Perform pivot transfer with CGA to/from mat.  Ambulation progression today using Rolling Walker.   Meek is progressing well towards his goals. He is demonstrating improvements in movement of RLE, improved ability to perform his transfers with CGA.once standing into chair. Improved control with toe taps.  Pt prognosis is Excellent.     Pt will continue to benefit from skilled outpatient physical therapy to address the deficits listed in the problem list box on initial evaluation, provide  pt/family education and to maximize pt's level of independence in the home and community environment.     Pt's spiritual, cultural and educational needs considered and pt agreeable to plan of care and goals.    Anticipated barriers to physical therapy: none identified at this time.     Goals:   In 6 weeks, pt. will:  1. Able to perform Supine <>Sit with CGA  2. Able to perform Sit <>Stand with Min A  3. Able to perform Standing Pivot Transfers with Mod A  4. Able to perform Static Standing unsupported x 1 min  Long Term GOALS:  In 12 weeks, pt. Will:  1. Able to perform Supine <>Sit Mod I  2. Able to perform Sit <>Stand Mod I  3. Able to perform standing pivot transfers Mod I  4. Able to ambulate with least restrictive device and CGA x 100ft  5.  be independent with HEP and SX management     Plan     Continue with Skilled Physical Thearpy 2x/week x 6 weeks for gait training; there exercise; neuro-re-education if patient does not get into the IP Rehab program.     Jonathan Favre, PTA

## 2018-08-16 NOTE — PROGRESS NOTES
Occupational Therapy Daily Treatment Note     Name: Meek Hanson  Clinic Number: [id number]    Therapy Diagnosis:   Encounter Diagnoses   Name Primary?    Muscle weakness Yes    Difficulty initiating walking      Physician: Cb Warren MD    Visit Date: 2018  Physician Orders: OT treatment  Medical Diagnosis: SCI  Evaluation Date: 2018    Time In: 1100  Time Out:1200  Total Billable Time: 60 minutes     Precautions: Standard; Fall Risk    Subjective      Pt reports:  Tightness/soreness in BUE shoulders and neck   Response to previous treatment: positive  Functional change:  Patient continues to make progress.     Pain:  soreness in shoulders and neck   Location: neck     Objective       Meek received therapeutic exercises to develop strength, endurance, ROM, flexibility, posture and core stabilization for  minutes includin# dowel in B elbow flex, chest press, elevation, FF, lateral rotation x 15  UBE performed x 10 minutes MIN-MOD resist requiring velcro strap for R   PROM R and L shoulder stretches in FF, ABD, ER as tolerated secondary to tightness/weakness    Heat application to B shoulders secondary to compaints of soreness/tightness    Meek performed neuromuscular reeducation consisting of:  Nu-Step x 20 minutes level 5 requiring R attachment for .,     Home Exercises Provided and Patient Education Provided     Education provided:   - BUE exercises AROM at home with theraband. Issued yellow theraband with demo performed and verbalized understanding from patient received.  -Positioning aide provided and education given for wear R hand  Meek demonstrated good  understanding of the education provided.     Assessment   Patient has greater deficits in RUE coordination and function compared to LUE. Patient has increasing limitations at cervical level R compared to L. Patient depends on tenodesis for R  and is limited to C6 level. Patient has  incoordination of LUE but demonstrates some function at C7 and C8.    Meek is progressing well towards his goals. He is showing improvement in ambulation with assistance.   Pt prognosis is Good.     Pt will continue to benefit from skilled outpatient occupational therapy to maximize pt's level of independence in the home and community environment.     Pt's spiritual, cultural and educational needs considered and pt agreeable to plan of care and goals.    Anticipated barriers to occupational therapy: resources      Plan     Continue OT treatment.      Ansley Lopez, OT

## 2018-08-23 ENCOUNTER — CLINICAL SUPPORT (OUTPATIENT)
Dept: REHABILITATION | Facility: HOSPITAL | Age: 33
End: 2018-08-23
Attending: FAMILY MEDICINE
Payer: MEDICAID

## 2018-08-23 DIAGNOSIS — R26.2 DIFFICULTY INITIATING WALKING: Primary | ICD-10-CM

## 2018-08-23 DIAGNOSIS — M62.81 MUSCLE WEAKNESS: ICD-10-CM

## 2018-08-23 PROCEDURE — 97112 NEUROMUSCULAR REEDUCATION: CPT | Mod: PN

## 2018-08-23 PROCEDURE — 97110 THERAPEUTIC EXERCISES: CPT | Mod: PN

## 2018-08-23 NOTE — PROGRESS NOTES
Physical Therapy Daily Treatment Note     Name: Meek Hanson  Clinic Number: [id number]    Therapy Diagnosis:   Encounter Diagnoses   Name Primary?    Difficulty initiating walking Yes    Muscle weakness      Physician: Cb Warren MD    Visit Date: 2018  Physician Orders: PT Eval and Treat  Medical Diagnosis: Spinal Cord Injury with quadraparesis  Evaluation Date: 2018  Authorization Period Expiration: 18  Plan of Care Certification Period: 2018  Visit #/Visits authorized:     Time In: 10:00 AM   Time Out: 11:30  AM  Total Billable Time: 45 minutes    Precautions: Standard    Subjective      Pt reports: No new c/o's.    Response to previous treatment:Meek has been working at home to improve his overall movement.  He stated that he has been working on transfers, propelling his chair and getting his right arm and leg to move;  also has not gotten brace (s) for hands.   Functional change: Meek stated that he was able to go up/down 4 steps to get into the house at home with assistance from 2 people;     Pain: 4/10  Location:  Denies any pain at present     Objective     Meek received therapeutic exercises to develop strength for 30 minutes includin. Seated LAQ's, and marching x 2 mins each;   2. Supine ball squeezes x 3 mins  3. Supine heel slides x 2 mins  4. Bridges x 20 reps  5. SAQ's x 3 mins  6. Sit to stand x 10  7. Nustep level 5 x 25 mins    Meek received the following manual therapy techniques:      Meek participated in neuromuscular re-education activities to improve: weight shifting, trunk stabilization; cross body movements  For 20  minutes. The following activities were included:  1. Forward/Backward/Side-Stepping in // bars with CGA x 1; concentrate on quad set on Right to stabilize his knee; clearing toe with stepping by lifting at hip.   2. Toe-Taps on Green Step  - again - weight-shifting techniques with stabilization of Right  knee and clearing toe on Right.   3. Unassisted standing: reaching with Left and Right Hand at various heights and distances to grasp cone - also performed across midline activities to work on rotation.       Meek participated in dynamic functional therapeutic activities to improve functional performance for   minutes, including:      Meek participated in gait training to improve functional mobility and safety for 10  minutes, including:  Gait training with Rolling Walker - required CGA x 1. Able to ambulate x 500ft; much improved with stance phase on right in terms of stability; able to lock knee in order to progress left foot forward;     Home Exercises Provided and Patient Education Provided     Education provided:   - discussed positive attitude needed when he goes for his assessment to IP Rehab; Meek was alittle hesitant about staying in [place], but discussed the benefits he would receive from participating in an IP Rehab Program.      Written Home Exercises Provided: .  Exercises were reviewed and Meek was able to demonstrate them prior to the end of the session.  Meek demonstrated good  understanding of the education provided.     Pt received a written copy of exercises to perform at home.    Meek demonstrated good  understanding of the education provided.     Assessment     Meek has made good progress with his ability to perform standing pivot transfers from last week.  He was able to perform sit<>stand with Min A;  Perform pivot transfer with CGA to/from mat.  Ambulation progression today using Rolling Walker.   Meek is progressing well towards his goals. He is demonstrating improvements in movement of RLE, improved ability to perform his transfers with CGA.once standing into chair. Improved control with toe taps.  Pt prognosis is Excellent.     Pt will continue to benefit from skilled outpatient physical therapy to address the deficits listed in the problem list box on initial evaluation, provide  pt/family education and to maximize pt's level of independence in the home and community environment.     Pt's spiritual, cultural and educational needs considered and pt agreeable to plan of care and goals.    Anticipated barriers to physical therapy: none identified at this time.     Goals:   In 6 weeks, pt. will:  1. Able to perform Supine <>Sit with CGA  2. Able to perform Sit <>Stand with Min A  3. Able to perform Standing Pivot Transfers with Mod A  4. Able to perform Static Standing unsupported x 1 min  Long Term GOALS:  In 12 weeks, pt. Will:  1. Able to perform Supine <>Sit Mod I  2. Able to perform Sit <>Stand Mod I  3. Able to perform standing pivot transfers Mod I  4. Able to ambulate with least restrictive device and CGA x 100ft  5.  be independent with HEP and SX management     Plan     Continue with Skilled Physical Thearpy 2x/week x 6 weeks for gait training; there exercise; neuro-re-education if patient does not get into the IP Rehab program.     Jonathan Favre, PTA

## 2018-08-29 ENCOUNTER — TELEPHONE (OUTPATIENT)
Dept: FAMILY MEDICINE | Facility: CLINIC | Age: 33
End: 2018-08-29

## 2018-08-29 DIAGNOSIS — L98.499 CHRONIC SKIN ULCER, UNSPECIFIED ULCER STAGE: Primary | ICD-10-CM

## 2018-08-29 NOTE — TELEPHONE ENCOUNTER
----- Message from Leila Kelly sent at 8/29/2018 11:55 AM CDT -----  Please call mom Elayne [phone]  / advising that his wound on his side is not healing

## 2018-08-30 ENCOUNTER — TELEPHONE (OUTPATIENT)
Dept: PODIATRY | Facility: CLINIC | Age: 33
End: 2018-08-30

## 2018-08-30 NOTE — TELEPHONE ENCOUNTER
"Call placed re wound care referral.  "Customer unavailable or out of service area"  Appt to be made with Dr Feldman by Lizzie at the [facility] location.  Lizzie will mail appt/contact the patient  "

## 2018-09-06 ENCOUNTER — CLINICAL SUPPORT (OUTPATIENT)
Dept: REHABILITATION | Facility: HOSPITAL | Age: 33
End: 2018-09-06
Attending: FAMILY MEDICINE
Payer: MEDICAID

## 2018-09-06 DIAGNOSIS — R26.2 DIFFICULTY INITIATING WALKING: Primary | ICD-10-CM

## 2018-09-06 DIAGNOSIS — R26.2 DIFFICULTY INITIATING WALKING: ICD-10-CM

## 2018-09-06 DIAGNOSIS — M62.81 MUSCLE WEAKNESS: ICD-10-CM

## 2018-09-06 DIAGNOSIS — M62.81 MUSCLE WEAKNESS: Primary | ICD-10-CM

## 2018-09-06 PROCEDURE — 97530 THERAPEUTIC ACTIVITIES: CPT | Mod: PN

## 2018-09-06 PROCEDURE — 97112 NEUROMUSCULAR REEDUCATION: CPT | Mod: PN

## 2018-09-06 PROCEDURE — 97110 THERAPEUTIC EXERCISES: CPT | Mod: PN

## 2018-09-06 NOTE — PROGRESS NOTES
Physical Therapy Daily Treatment Note     Name: Meek Hanson  Clinic Number: [id number]    Therapy Diagnosis:   Encounter Diagnoses   Name Primary?    Difficulty initiating walking Yes    Muscle weakness      Physician: Cb Warren MD    Visit Date: 2018  Physician Orders: PT Eval and Treat  Medical Diagnosis: Spinal Cord Injury with quadraparesis  Evaluation Date: 2018  Authorization Period Expiration: 18  Plan of Care Certification Period: 2018  Visit #/Visits authorized:     Time In: 9:50 AM   Time Out: 11:00  AM  Total Billable Time: 45 minutes    Precautions: Standard    Subjective      Pt reports: No new c/o's.    Response to previous treatment:Meek has been working at home to improve his overall movement.  He stated that he has been working on transfers, propelling his chair and getting his right arm and leg to move;  also has not gotten brace (s) for hands.   Functional change: Meek stated that he was able to go up/down 4 steps to get into the house at home with assistance from 2 people;     Pain: 4/10  Location:  Denies any pain at present     Objective     Meek received therapeutic exercises to develop strength for 30 minutes includin. Seated LAQ's, and marching x 2 mins each;   2. Supine ball squeezes x 3 mins  3. Supine heel slides x 2 mins  4. Bridges x 20 reps  5. SAQ's x 3 mins  6. Sit to stand x 10  7. Nustep level 5 x 25 mins    Meek received the following manual therapy techniques:      Meek participated in neuromuscular re-education activities to improve: weight shifting, trunk stabilization; cross body movements  For 20  minutes. The following activities were included:  1. Forward/Backward/Side-Stepping in // bars with CGA x 1; concentrate on quad set on Right to stabilize his knee; clearing toe with stepping by lifting at hip.   2. Toe-Taps on Green Step  - again - weight-shifting techniques with stabilization of Right knee  and clearing toe on Right.   3. Unassisted standing: reaching with Left and Right Hand at various heights and distances to grasp cone - also performed across midline activities to work on rotation.       Meek participated in dynamic functional therapeutic activities to improve functional performance for   minutes, including:      Meek participated in gait training to improve functional mobility and safety for 10  minutes, including:  Gait training with Rolling Walker - required CGA x 1. Able to ambulate x 500ft; much improved with stance phase on right in terms of stability; able to lock knee in order to progress left foot forward;     Home Exercises Provided and Patient Education Provided     Education provided:   - discussed positive attitude needed when he goes for his assessment to IP Rehab; Meek was alittle hesitant about staying in [place], but discussed the benefits he would receive from participating in an IP Rehab Program.      Written Home Exercises Provided: .  Exercises were reviewed and Meek was able to demonstrate them prior to the end of the session.  Meek demonstrated good  understanding of the education provided.     Pt received a written copy of exercises to perform at home.    Meek demonstrated good  understanding of the education provided.     Assessment     Meek has made good progress with his ability to perform standing pivot transfers from last week.  He was able to perform sit<>stand with Min A;  Perform pivot transfer with CGA to/from mat.  Ambulation progression today using Rolling Walker.   Meek is progressing well towards his goals. He is demonstrating improvements in movement of RLE, improved ability to perform his transfers with CGA.once standing into chair. Improved control with toe taps.  Pt prognosis is Excellent.     Pt will continue to benefit from skilled outpatient physical therapy to address the deficits listed in the problem list box on initial evaluation, provide  pt/family education and to maximize pt's level of independence in the home and community environment.     Pt's spiritual, cultural and educational needs considered and pt agreeable to plan of care and goals.    Anticipated barriers to physical therapy: none identified at this time.     Goals:   In 6 weeks, pt. will:  1. Able to perform Supine <>Sit with CGA  2. Able to perform Sit <>Stand with Min A  3. Able to perform Standing Pivot Transfers with Mod A  4. Able to perform Static Standing unsupported x 1 min  Long Term GOALS:  In 12 weeks, pt. Will:  1. Able to perform Supine <>Sit Mod I  2. Able to perform Sit <>Stand Mod I  3. Able to perform standing pivot transfers Mod I  4. Able to ambulate with least restrictive device and CGA x 100ft  5.  be independent with HEP and SX management     Plan     Continue with Skilled Physical Thearpy 2x/week x 6 weeks for gait training; there exercise; neuro-re-education if patient does not get into the IP Rehab program.     Jonathan Favre, PTA
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
## 2018-12-24 ENCOUNTER — OFFICE VISIT (OUTPATIENT)
Dept: FAMILY MEDICINE | Facility: CLINIC | Age: 33
End: 2018-12-24
Payer: MEDICAID

## 2018-12-24 VITALS
RESPIRATION RATE: 20 BRPM | SYSTOLIC BLOOD PRESSURE: 111 MMHG | WEIGHT: 283 LBS | DIASTOLIC BLOOD PRESSURE: 67 MMHG | HEART RATE: 74 BPM | BODY MASS INDEX: 42.89 KG/M2 | HEIGHT: 68 IN

## 2018-12-24 DIAGNOSIS — H60.90 OTITIS EXTERNA, UNSPECIFIED CHRONICITY, UNSPECIFIED LATERALITY, UNSPECIFIED TYPE: Primary | ICD-10-CM

## 2018-12-24 DIAGNOSIS — L03.90 CELLULITIS, UNSPECIFIED CELLULITIS SITE: ICD-10-CM

## 2018-12-24 PROCEDURE — 99213 OFFICE O/P EST LOW 20 MIN: CPT | Mod: PBBFAC,PN | Performed by: FAMILY MEDICINE

## 2018-12-24 PROCEDURE — 99214 OFFICE O/P EST MOD 30 MIN: CPT | Mod: S$PBB,,, | Performed by: FAMILY MEDICINE

## 2018-12-24 PROCEDURE — 99999 PR PBB SHADOW E&M-EST. PATIENT-LVL III: CPT | Mod: PBBFAC,,, | Performed by: FAMILY MEDICINE

## 2018-12-24 RX ORDER — DOXYCYCLINE HYCLATE 100 MG/1
100 TABLET, DELAYED RELEASE ORAL 2 TIMES DAILY
Qty: 20 TABLET | Refills: 0 | Status: SHIPPED | OUTPATIENT
Start: 2018-12-24 | End: 2019-05-07

## 2018-12-24 RX ORDER — TRAZODONE HYDROCHLORIDE 100 MG/1
TABLET ORAL
Qty: 60 TABLET | Refills: 11 | Status: SHIPPED | OUTPATIENT
Start: 2018-12-24 | End: 2019-08-23

## 2018-12-24 RX ORDER — HYDROCORTISONE AND ACETIC ACID 20.75; 10.375 MG/ML; MG/ML
3 SOLUTION AURICULAR (OTIC) 2 TIMES DAILY
Qty: 10 ML | Refills: 2 | Status: SHIPPED | OUTPATIENT
Start: 2018-12-24 | End: 2019-01-03

## 2018-12-24 NOTE — PROGRESS NOTES
"Subjective:       Patient ID: Meek Hanson is a 33 y.o. male.    Chief Complaint: Rash; Otalgia; Spasms; and Insomnia    Follow up    Sore area on R posterior thigh  Worsening  No fever  Warm    Itching in ears  No fever  No decreased hearing      Review of Systems   Constitutional: Negative for activity change, appetite change, fatigue and fever.   HENT: Negative for congestion, dental problem, ear discharge, hearing loss, postnasal drip, sinus pain, sneezing and trouble swallowing.    Eyes: Negative for photophobia and discharge.   Respiratory: Negative for apnea, cough and shortness of breath.    Cardiovascular: Negative for chest pain.   Gastrointestinal: Negative for abdominal distention, abdominal pain, blood in stool, diarrhea, nausea and vomiting.   Endocrine: Negative for cold intolerance.   Genitourinary: Negative for difficulty urinating, flank pain, frequency, hematuria and testicular pain.   Musculoskeletal: Positive for back pain and neck stiffness. Negative for arthralgias and myalgias.   Skin: Positive for rash. Negative for color change.   Allergic/Immunologic: Negative for environmental allergies, food allergies and immunocompromised state.   Neurological: Negative for dizziness, syncope, numbness and headaches.   Hematological: Negative for adenopathy. Does not bruise/bleed easily.   Psychiatric/Behavioral: Negative for agitation, confusion, decreased concentration, hallucinations, self-injury and sleep disturbance.   All other systems reviewed and are negative.        Reviewed family, medical, surgical, and social history.    Objective:      /67 (BP Location: Left arm, Patient Position: Sitting, BP Method: Large (Automatic))   Pulse 74   Resp 20   Ht 5' 8" (1.727 m)   Wt 128.4 kg (283 lb)   BMI 43.03 kg/m²   Physical Exam   Constitutional: He is oriented to person, place, and time. No distress.   HENT:   Head: Normocephalic and atraumatic.   Nose: Nose normal.   Mouth/Throat: " Oropharynx is clear and moist. No oropharyngeal exudate.   Eyes: Conjunctivae and EOM are normal. Pupils are equal, round, and reactive to light.   Neck: No thyromegaly present.   Cardiovascular: Normal rate, regular rhythm, normal heart sounds and intact distal pulses. Exam reveals no gallop and no friction rub.   No murmur heard.  Pulmonary/Chest: Effort normal and breath sounds normal. No respiratory distress. He has no wheezes. He has no rales.   Abdominal: Soft. He exhibits no distension. There is no tenderness. There is no guarding.   Musculoskeletal: He exhibits tenderness and deformity. He exhibits no edema.   Neurological: He is alert and oriented to person, place, and time. He displays abnormal reflex. A sensory deficit is present. No cranial nerve deficit. He exhibits abnormal muscle tone. Coordination abnormal.   Skin: Skin is warm and dry. No rash noted. He is not diaphoretic. No erythema. No pallor.   Psychiatric: He has a normal mood and affect. His behavior is normal. Judgment and thought content normal.   Nursing note and vitals reviewed.      Assessment:       1. Otitis externa, unspecified chronicity, unspecified laterality, unspecified type    2. Cellulitis, unspecified cellulitis site        Plan:       Otitis externa, unspecified chronicity, unspecified laterality, unspecified type  -     acetic acid-hydrocortisone (VOSOL-HC) otic solution; Place 3 drops into both ears 2 (two) times daily. for 10 days  Dispense: 10 mL; Refill: 2    Cellulitis, unspecified cellulitis site  -     doxycycline (DORYX) 100 MG EC tablet; Take 1 tablet (100 mg total) by mouth 2 (two) times daily.  Dispense: 20 tablet; Refill: 0    Other orders  -     traZODone (DESYREL) 100 MG tablet; Take 1-2 tablets at night as needed for sleep  Dispense: 60 tablet; Refill: 11            Risks, benefits, and side effects were discussed with the patient. All questions were answered to the fullest satisfaction of the patient, and pt  verbalized understanding and agreement to treatment plan. Pt was to call with any new or worsening symptoms, or present to the ER.

## 2019-02-19 ENCOUNTER — OFFICE VISIT (OUTPATIENT)
Dept: FAMILY MEDICINE | Facility: CLINIC | Age: 34
End: 2019-02-19
Payer: MEDICAID

## 2019-02-19 ENCOUNTER — TELEPHONE (OUTPATIENT)
Dept: FAMILY MEDICINE | Facility: CLINIC | Age: 34
End: 2019-02-19

## 2019-02-19 ENCOUNTER — LAB VISIT (OUTPATIENT)
Dept: LAB | Facility: HOSPITAL | Age: 34
End: 2019-02-19
Attending: FAMILY MEDICINE
Payer: MEDICAID

## 2019-02-19 VITALS
DIASTOLIC BLOOD PRESSURE: 75 MMHG | BODY MASS INDEX: 45.26 KG/M2 | OXYGEN SATURATION: 97 % | HEART RATE: 85 BPM | RESPIRATION RATE: 20 BRPM | HEIGHT: 68 IN | SYSTOLIC BLOOD PRESSURE: 125 MMHG | WEIGHT: 298.63 LBS

## 2019-02-19 DIAGNOSIS — M62.81 MUSCLE WEAKNESS: ICD-10-CM

## 2019-02-19 DIAGNOSIS — K59.00 CONSTIPATION, UNSPECIFIED CONSTIPATION TYPE: ICD-10-CM

## 2019-02-19 DIAGNOSIS — R60.9 EDEMA, UNSPECIFIED TYPE: ICD-10-CM

## 2019-02-19 DIAGNOSIS — R21 RASH: ICD-10-CM

## 2019-02-19 DIAGNOSIS — R60.9 EDEMA, UNSPECIFIED TYPE: Primary | ICD-10-CM

## 2019-02-19 LAB
ALBUMIN SERPL BCP-MCNC: 4.1 G/DL
ALP SERPL-CCNC: 75 U/L
ALT SERPL W/O P-5'-P-CCNC: 41 U/L
ANION GAP SERPL CALC-SCNC: 10 MMOL/L
AST SERPL-CCNC: 34 U/L
BASOPHILS # BLD AUTO: 0.06 K/UL
BASOPHILS NFR BLD: 1 %
BILIRUB SERPL-MCNC: 0.3 MG/DL
BNP SERPL-MCNC: 42 PG/ML
BUN SERPL-MCNC: 17 MG/DL
CALCIUM SERPL-MCNC: 9.2 MG/DL
CHLORIDE SERPL-SCNC: 105 MMOL/L
CHOLEST SERPL-MCNC: 175 MG/DL
CHOLEST/HDLC SERPL: 4.9 {RATIO}
CO2 SERPL-SCNC: 24 MMOL/L
CREAT SERPL-MCNC: 0.8 MG/DL
DIFFERENTIAL METHOD: ABNORMAL
EOSINOPHIL # BLD AUTO: 0.2 K/UL
EOSINOPHIL NFR BLD: 3.8 %
ERYTHROCYTE [DISTWIDTH] IN BLOOD BY AUTOMATED COUNT: 11.9 %
EST. GFR  (AFRICAN AMERICAN): >60 ML/MIN/1.73 M^2
EST. GFR  (NON AFRICAN AMERICAN): >60 ML/MIN/1.73 M^2
GLUCOSE SERPL-MCNC: 118 MG/DL
HCT VFR BLD AUTO: 39.8 %
HDLC SERPL-MCNC: 36 MG/DL
HDLC SERPL: 20.6 %
HGB BLD-MCNC: 13.7 G/DL
IMM GRANULOCYTES # BLD AUTO: 0.03 K/UL
IMM GRANULOCYTES NFR BLD AUTO: 0.5 %
LDLC SERPL CALC-MCNC: 106.6 MG/DL
LYMPHOCYTES # BLD AUTO: 1.6 K/UL
LYMPHOCYTES NFR BLD: 27.4 %
MCH RBC QN AUTO: 30.6 PG
MCHC RBC AUTO-ENTMCNC: 34.4 G/DL
MCV RBC AUTO: 89 FL
MONOCYTES # BLD AUTO: 0.5 K/UL
MONOCYTES NFR BLD: 9.3 %
NEUTROPHILS # BLD AUTO: 3.4 K/UL
NEUTROPHILS NFR BLD: 58 %
NONHDLC SERPL-MCNC: 139 MG/DL
NRBC BLD-RTO: 0 /100 WBC
PLATELET # BLD AUTO: 197 K/UL
PMV BLD AUTO: 9 FL
POTASSIUM SERPL-SCNC: 3.7 MMOL/L
PROT SERPL-MCNC: 8 G/DL
RBC # BLD AUTO: 4.47 M/UL
SODIUM SERPL-SCNC: 139 MMOL/L
TRIGL SERPL-MCNC: 162 MG/DL
TSH SERPL DL<=0.005 MIU/L-ACNC: 3.69 UIU/ML
WBC # BLD AUTO: 5.83 K/UL

## 2019-02-19 PROCEDURE — 99214 PR OFFICE/OUTPT VISIT, EST, LEVL IV, 30-39 MIN: ICD-10-PCS | Mod: S$PBB,,, | Performed by: FAMILY MEDICINE

## 2019-02-19 PROCEDURE — 84443 ASSAY THYROID STIM HORMONE: CPT

## 2019-02-19 PROCEDURE — 83880 ASSAY OF NATRIURETIC PEPTIDE: CPT

## 2019-02-19 PROCEDURE — 85025 COMPLETE CBC W/AUTO DIFF WBC: CPT

## 2019-02-19 PROCEDURE — 99214 OFFICE O/P EST MOD 30 MIN: CPT | Mod: S$PBB,,, | Performed by: FAMILY MEDICINE

## 2019-02-19 PROCEDURE — 99999 PR PBB SHADOW E&M-EST. PATIENT-LVL III: ICD-10-PCS | Mod: PBBFAC,,, | Performed by: FAMILY MEDICINE

## 2019-02-19 PROCEDURE — 99999 PR PBB SHADOW E&M-EST. PATIENT-LVL III: CPT | Mod: PBBFAC,,, | Performed by: FAMILY MEDICINE

## 2019-02-19 PROCEDURE — 99213 OFFICE O/P EST LOW 20 MIN: CPT | Mod: PBBFAC,PN | Performed by: FAMILY MEDICINE

## 2019-02-19 PROCEDURE — 80053 COMPREHEN METABOLIC PANEL: CPT

## 2019-02-19 PROCEDURE — 36415 COLL VENOUS BLD VENIPUNCTURE: CPT

## 2019-02-19 PROCEDURE — 80061 LIPID PANEL: CPT

## 2019-02-19 RX ORDER — MUPIROCIN 20 MG/G
OINTMENT TOPICAL 3 TIMES DAILY
Qty: 30 G | Refills: 5 | Status: SHIPPED | OUTPATIENT
Start: 2019-02-19 | End: 2019-02-19 | Stop reason: SDUPTHER

## 2019-02-19 RX ORDER — HYDROCHLOROTHIAZIDE 25 MG/1
25 TABLET ORAL DAILY
Qty: 30 TABLET | Refills: 11 | Status: SHIPPED | OUTPATIENT
Start: 2019-02-19 | End: 2019-02-19 | Stop reason: SDUPTHER

## 2019-02-19 RX ORDER — HYDROCHLOROTHIAZIDE 25 MG/1
25 TABLET ORAL DAILY
Qty: 30 TABLET | Refills: 11 | Status: SHIPPED | OUTPATIENT
Start: 2019-02-19 | End: 2019-05-07

## 2019-02-19 RX ORDER — MUPIROCIN 20 MG/G
OINTMENT TOPICAL 3 TIMES DAILY
Qty: 30 G | Refills: 5 | Status: SHIPPED | OUTPATIENT
Start: 2019-02-19

## 2019-02-19 NOTE — PROGRESS NOTES
"Subjective:       Patient ID: Meek Hanson is a 33 y.o. male.    Chief Complaint: Follow-up; Urinary Retention; Leg Swelling; and Rash    Follow up    Mild edema  In extremities  Also with difficulty urinating  Last few weeks  + constipation      Review of Systems   Constitutional: Negative for activity change, appetite change, fatigue and fever.   HENT: Negative for congestion, dental problem, ear discharge, hearing loss, postnasal drip, sinus pain, sneezing and trouble swallowing.    Eyes: Negative for photophobia and discharge.   Respiratory: Negative for apnea, cough and shortness of breath.    Cardiovascular: Negative for chest pain.   Gastrointestinal: Negative for abdominal distention, abdominal pain, blood in stool, diarrhea, nausea and vomiting.   Endocrine: Negative for cold intolerance.   Genitourinary: Negative for difficulty urinating, flank pain, frequency, hematuria and testicular pain.   Musculoskeletal: Positive for back pain and neck stiffness. Negative for arthralgias and myalgias.   Skin: Positive for rash. Negative for color change.   Allergic/Immunologic: Negative for environmental allergies, food allergies and immunocompromised state.   Neurological: Negative for dizziness, syncope, numbness and headaches.   Hematological: Negative for adenopathy. Does not bruise/bleed easily.   Psychiatric/Behavioral: Negative for agitation, confusion, decreased concentration, hallucinations, self-injury and sleep disturbance.   All other systems reviewed and are negative.        Reviewed family, medical, surgical, and social history.    Objective:      /75 (BP Location: Left arm, Patient Position: Sitting, BP Method: X-Large (Automatic))   Pulse 85   Resp 20   Ht 5' 8" (1.727 m)   Wt 135.4 kg (298 lb 9.6 oz)   SpO2 97%   BMI 45.40 kg/m²   Physical Exam   Constitutional: He is oriented to person, place, and time. No distress.   HENT:   Head: Normocephalic and atraumatic.   Nose: Nose normal. "   Mouth/Throat: Oropharynx is clear and moist. No oropharyngeal exudate.   Eyes: Conjunctivae and EOM are normal. Pupils are equal, round, and reactive to light.   Neck: No thyromegaly present.   Cardiovascular: Normal rate, regular rhythm, normal heart sounds and intact distal pulses. Exam reveals no gallop and no friction rub.   No murmur heard.  Pulmonary/Chest: Effort normal and breath sounds normal. No respiratory distress. He has no wheezes. He has no rales.   Abdominal: Soft. He exhibits no distension. There is no tenderness. There is no guarding.   Musculoskeletal: He exhibits tenderness and deformity. He exhibits no edema.   Neurological: He is alert and oriented to person, place, and time. He displays abnormal reflex. A sensory deficit is present. No cranial nerve deficit. He exhibits abnormal muscle tone. Coordination abnormal.   Skin: Skin is warm and dry. No rash noted. He is not diaphoretic. No erythema. No pallor.   Psychiatric: He has a normal mood and affect. His behavior is normal. Judgment and thought content normal.   Nursing note and vitals reviewed.      Assessment:       1. Edema, unspecified type    2. Constipation, unspecified constipation type    3. Rash    4. Muscle weakness        Plan:       Edema, unspecified type  -     Discontinue: hydroCHLOROthiazide (HYDRODIURIL) 25 MG tablet; Take 1 tablet (25 mg total) by mouth once daily.  Dispense: 30 tablet; Refill: 11  -     CBC auto differential; Future; Expected date: 02/19/2019  -     Comprehensive metabolic panel; Future; Expected date: 02/19/2019  -     Lipid panel; Future; Expected date: 02/19/2019  -     TSH; Future; Expected date: 02/19/2019  -     Brain natriuretic peptide; Future; Expected date: 02/19/2019  -     hydroCHLOROthiazide (HYDRODIURIL) 25 MG tablet; Take 1 tablet (25 mg total) by mouth once daily.  Dispense: 30 tablet; Refill: 11    Constipation, unspecified constipation type  -     linaclotide (LINZESS) 145 mcg Cap  capsule; Take 1 capsule (145 mcg total) by mouth once daily.  Dispense: 30 capsule; Refill: 2  -     CBC auto differential; Future; Expected date: 02/19/2019  -     Comprehensive metabolic panel; Future; Expected date: 02/19/2019  -     Lipid panel; Future; Expected date: 02/19/2019  -     TSH; Future; Expected date: 02/19/2019  -     Brain natriuretic peptide; Future; Expected date: 02/19/2019    Rash  -     Discontinue: mupirocin (BACTROBAN) 2 % ointment; Apply topically 3 (three) times daily.  Dispense: 30 g; Refill: 5  -     CBC auto differential; Future; Expected date: 02/19/2019  -     Comprehensive metabolic panel; Future; Expected date: 02/19/2019  -     Lipid panel; Future; Expected date: 02/19/2019  -     TSH; Future; Expected date: 02/19/2019  -     Brain natriuretic peptide; Future; Expected date: 02/19/2019  -     mupirocin (BACTROBAN) 2 % ointment; Apply topically 3 (three) times daily.  Dispense: 30 g; Refill: 5    Muscle weakness  -     Ambulatory Referral to Physical/Occupational Therapy  -     CBC auto differential; Future; Expected date: 02/19/2019  -     Comprehensive metabolic panel; Future; Expected date: 02/19/2019  -     Lipid panel; Future; Expected date: 02/19/2019  -     TSH; Future; Expected date: 02/19/2019  -     Brain natriuretic peptide; Future; Expected date: 02/19/2019            Risks, benefits, and side effects were discussed with the patient. All questions were answered to the fullest satisfaction of the patient, and pt verbalized understanding and agreement to treatment plan. Pt was to call with any new or worsening symptoms, or present to the ER.

## 2019-02-19 NOTE — TELEPHONE ENCOUNTER
Informed pt that a PA is needed. PA has already been completed, but no response received.       ----- Message from Leonides Das sent at 2/19/2019  3:00 PM CST -----  Type: Needs Medical Advice    Who Called:  Wife  Best Call Back Number: [phone] (home)   Additional Information: wife is being told his Linzess is too soon to fill, but he has never been prescribed it before

## 2019-03-13 ENCOUNTER — CLINICAL SUPPORT (OUTPATIENT)
Dept: REHABILITATION | Facility: HOSPITAL | Age: 34
End: 2019-03-13
Attending: FAMILY MEDICINE
Payer: MEDICAID

## 2019-03-13 DIAGNOSIS — R26.2 DIFFICULTY WALKING: Primary | ICD-10-CM

## 2019-03-13 DIAGNOSIS — R29.898 BILATERAL LEG WEAKNESS: ICD-10-CM

## 2019-03-13 DIAGNOSIS — M62.81 MUSCLE WEAKNESS: ICD-10-CM

## 2019-03-13 PROCEDURE — 97162 PT EVAL MOD COMPLEX 30 MIN: CPT | Mod: PN
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
## 2019-03-21 ENCOUNTER — CLINICAL SUPPORT (OUTPATIENT)
Dept: REHABILITATION | Facility: HOSPITAL | Age: 34
End: 2019-03-21
Attending: FAMILY MEDICINE
Payer: MEDICAID

## 2019-03-21 DIAGNOSIS — R26.2 DIFFICULTY IN WALKING: ICD-10-CM

## 2019-03-21 DIAGNOSIS — M62.81 MUSCLE WEAKNESS: Primary | ICD-10-CM

## 2019-03-21 DIAGNOSIS — R29.898 BILATERAL LEG WEAKNESS: Primary | ICD-10-CM

## 2019-03-21 PROCEDURE — 97112 NEUROMUSCULAR REEDUCATION: CPT | Mod: PN

## 2019-03-21 PROCEDURE — 97110 THERAPEUTIC EXERCISES: CPT | Mod: PN

## 2019-03-21 PROCEDURE — 97166 OT EVAL MOD COMPLEX 45 MIN: CPT | Mod: PN

## 2019-03-21 NOTE — PROGRESS NOTES
Physical Therapy Daily Note     Name: Meek Hanson  Clinic Number: [id number]  Diagnosis:   Encounter Diagnoses   Name Primary?    Bilateral leg weakness Yes    Difficulty in walking      Physician: Cb Warren MD  Precautions: standard, fall   Visit #:   PTA Visit #: 0  Time In: 9:00 AM   Time Out: 10:15 AM    Subjective     Pt reports: I'm doing ok;   Pain Scale: Meek rates pain on a scale of 0-10 to be 4-5 currently in lower back     Objective     Meek received individual therapeutic exercises/Neuro Re-Ed  to develop strength, endurance, posture and core stabilization for 45 minutes includin. Toe Tap  2. Staggered standing - one foot up on bench - trunk rotation  3. SLS - alternate leg tap top of cones  4. Hip flexion in sitting  5. LAQ's   6. Bridging   7. Hip/Knee Flexion in supine with leg in 30 degrees flexion   8. Isometric abdominals with red swiss ball   9. Clams  10. Side Lying Hip Abduction   11. Nu-Step  Level 5    Meek received the following manual therapy techniques:  were applied to the:  for  minutes including:       The patient received the following direct contact modalities after being cleared for contraindications:     The patient received the following supervised modalities after being cleared for contradictions:     Written Home Exercises Provided: ambulation using his cane - he just carry's it - does not use it to help support his RLE.   Pt demo good understanding of the education provided. Meek demonstrated good return demonstration of activities.     Education provided re:  Meek verbalized good understanding of education provided.   No spiritual or educational barriers to learning provided    Assessment     Patient tolerated treatment well.  He fatigues easily secondary to decreased endurance, obesity.  Needs to lose some weight in order to help himself move easier.   This is a 33 y.o. male referred to outpatient  physical therapy and presents with a medical diagnosis of general weakness - s/p SCI and demonstrates limitations as described in the problem list. Pt prognosis is Good. Pt will continue to benefit from skilled outpatient physical therapy to address the deficits listed in the problem list, provide pt/family education and to maximize pt's level of independence in the home and community environment.     Goals as follows:  Long Term GOALS:  In 6 weeks, pt. will:  1. Demonstrate ability to ambulate with least restrictive device x 1000ft in community type environment  2. Demonstrate improvement in LE strength by 1/2 grade  3. Demonstrate ability to perform supine to sit transfer indep.  4. Demonstrate ability to go up and down steps with single railing - 2 feet per step.   5. LEFS score improved fro 95% limitation to 70% limitation   6. Be independent with HEP and SX management         Plan     Continue with established Plan of Care towards PT goals. - Tx 2x/week x 6 weeks     Therapist: Melia Sampson, PT  3/21/2019
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
## 2019-03-25 ENCOUNTER — TELEPHONE (OUTPATIENT)
Dept: FAMILY MEDICINE | Facility: CLINIC | Age: 34
End: 2019-03-25

## 2019-03-25 DIAGNOSIS — R60.9 EDEMA, UNSPECIFIED TYPE: Primary | ICD-10-CM

## 2019-03-26 ENCOUNTER — CLINICAL SUPPORT (OUTPATIENT)
Dept: REHABILITATION | Facility: HOSPITAL | Age: 34
End: 2019-03-26
Attending: FAMILY MEDICINE
Payer: MEDICAID

## 2019-03-26 DIAGNOSIS — M62.81 MUSCLE WEAKNESS: Primary | ICD-10-CM

## 2019-03-26 DIAGNOSIS — R26.2 DIFFICULTY IN WALKING: Primary | ICD-10-CM

## 2019-03-26 DIAGNOSIS — R29.898 BILATERAL LEG WEAKNESS: ICD-10-CM

## 2019-03-26 PROCEDURE — 97110 THERAPEUTIC EXERCISES: CPT | Mod: PN

## 2019-03-26 PROCEDURE — 97010 HOT OR COLD PACKS THERAPY: CPT | Mod: PN

## 2019-03-26 NOTE — PROGRESS NOTES
Occupational Therapy Daily Treatment Note     Name: Meek Hanson  Clinic Number: [id number]    Therapy Diagnosis:   Encounter Diagnosis   Name Primary?    Muscle weakness Yes     Physician: Cb Warren MD    Visit Date: 3/26/2019    Physician Orders: OT for strengthening  Medical Diagnosis: SCI  Evaluation Date: 03/21/2019  Authorization Period Expiration: 12/31/2019  Plan of Care Certification Period: 6 weeks  Visit #/Visits authorized: 2    Time In: 815  Time Out: 900  Total Billable Time: 45 minutes    Precautions: Standard and Fall    Subjective     Pt reports: no complaints  He was compliant with home exercise program.  Response to previous treatment: tolerating tx   Functional change: none noted    Pain: 3/10  Location: neck and R shoulder      Objective     Meek received therapeutic exercises to develop strength and flexibility minutes including:  UBE x 6 mins level 3   1# dowel in B chest press, FF, elevation x 12 each   Pulleys x 2 mins FF      Meek received hot pack for 15 minutes to R shoulder.      Home Exercises Provided and Patient Education Provided     Education provided:   - POC    Written Home Exercises Provided: NA.  Exercises were reviewed and Meek was able to demonstrate them prior to the end of the session.  Meek demonstrated NA understanding of the education provided.     See EMR under Patient Instructions for exercises provided prior visit.    Assessment       Meek is progressing well towards his goals.   Pt prognosis is Good.     Pt will continue to benefit from skilled outpatient physical therapy to address the deficits listed in the problem list box on initial evaluation, provide pt/family education and to maximize pt's level of independence in the home and community environment.     Pt's spiritual, cultural and educational needs considered and pt agreeable to plan of care and goals.     Anticipated barriers to occupational therapy: none    Goals:  Patient will tolerate BUE  strengthening to improve ROM and strength for ADL's.   Patient will perform GM/FM exercises RUE to improve coordination required for ADL's.  Patient to receive education in AE use for UB dressing using buttong hook/zipper pull for improved ADL function.   Patient will receive splint assessment RUE secondary to spasticity of hand.         Plan     Continue OT per established POC    Ansley Lopez, OT

## 2019-03-26 NOTE — PROGRESS NOTES
Physical Therapy Daily Note     Name: Meek Hanson  Clinic Number: [id number]  Diagnosis:   Encounter Diagnoses   Name Primary?    Difficulty in walking Yes    Bilateral leg weakness      Physician: Cb Warren MD  Precautions: standard, fall   Visit #: 2 of 12  PTA Visit #: 1  Time In: 9:00 AM   Time Out: 10:00 AM    Subjective     Pt reports: No new c/o's.    Pain Scale: Meek rates pain on a scale of 0-10 to be 4 currently in lower back     Objective     Meek received individual therapeutic exercises/Neuro Re-Ed  to develop strength, endurance, posture and core stabilization for 45 minutes includin. Toe Taps x 3 min  2. Staggered standing - one foot up on bench - trunk rotation  3. SLS - alternate leg tap top of cones x 2 min  4. Hip flexion in sitting x 15 each  5. LAQ's x 15 each  6. Bridging x 15  7. Hip/Knee Flexion in supine with leg in 30 degrees flexion x 15 each  8. Isometric abdominals with red swiss ball x 20  9. Clams x 15  10. Side Lying Hip Abduction x 10   11. Nu-Step  Level 5 x 20 mins    Meek received the following manual therapy techniques:  were applied to the:  for  minutes including:       The patient received the following direct contact modalities after being cleared for contraindications:     The patient received the following supervised modalities after being cleared for contradictions:     Written Home Exercises Provided: ambulation using his cane - he just carry's it - does not use it to help support his RLE.   Pt demo good understanding of the education provided. Meek demonstrated good return demonstration of activities.     Education provided re:  Meek verbalized good understanding of education provided.   No spiritual or educational barriers to learning provided    Assessment     Patient tolerated treatment well.  He fatigues easily secondary to decreased endurance, obesity.  Needs to lose some weight in order to  help himself move easier.   This is a 33 y.o. male referred to outpatient physical therapy and presents with a medical diagnosis of general weakness - s/p SCI and demonstrates limitations as described in the problem list. Pt prognosis is Good. Pt will continue to benefit from skilled outpatient physical therapy to address the deficits listed in the problem list, provide pt/family education and to maximize pt's level of independence in the home and community environment.     Goals as follows:  Long Term GOALS:  In 6 weeks, pt. will:  1. Demonstrate ability to ambulate with least restrictive device x 1000ft in community type environment  2. Demonstrate improvement in LE strength by 1/2 grade  3. Demonstrate ability to perform supine to sit transfer indep.  4. Demonstrate ability to go up and down steps with single railing - 2 feet per step.   5. LEFS score improved fro 95% limitation to 70% limitation   6. Be independent with HEP and SX management         Plan     Continue with established Plan of Care towards PT goals. - Tx 2x/week x 6 weeks     Therapist: Jonathan Favre, PTA  3/26/2019

## 2019-04-02 ENCOUNTER — CLINICAL SUPPORT (OUTPATIENT)
Dept: REHABILITATION | Facility: HOSPITAL | Age: 34
End: 2019-04-02
Attending: FAMILY MEDICINE
Payer: MEDICAID

## 2019-04-02 DIAGNOSIS — M62.81 MUSCLE WEAKNESS: ICD-10-CM

## 2019-04-02 DIAGNOSIS — R29.898 BILATERAL LEG WEAKNESS: ICD-10-CM

## 2019-04-02 DIAGNOSIS — M62.81 MUSCLE WEAKNESS: Primary | ICD-10-CM

## 2019-04-02 DIAGNOSIS — R26.2 DIFFICULTY IN WALKING: Primary | ICD-10-CM

## 2019-04-02 PROCEDURE — 97530 THERAPEUTIC ACTIVITIES: CPT | Mod: PN

## 2019-04-02 PROCEDURE — 97110 THERAPEUTIC EXERCISES: CPT | Mod: PN

## 2019-04-02 PROCEDURE — 97010 HOT OR COLD PACKS THERAPY: CPT | Mod: PN

## 2019-04-02 NOTE — PROGRESS NOTES
Physical Therapy Daily Note     Name: Meek Hanson  Clinic Number: [id number]  Diagnosis:   Encounter Diagnoses   Name Primary?    Difficulty in walking Yes    Muscle weakness     Bilateral leg weakness      Physician: Cb Warren MD  Precautions: standard, fall   Visit #: 3 of 12  PTA Visit #: 2  Time In: 9:00 AM   Time Out: 10:15 AM    Subjective     Pt reports: No new c/o's.    Pain Scale: Meek rates pain on a scale of 0-10 to be 2 currently in lower back     Objective     Meek received individual therapeutic exercises/Neuro Re-Ed  to develop strength, endurance, posture and core stabilization for 45 minutes includin. Toe Taps x 3 min  2. Staggered standing - one foot up on bench - trunk rotation  3. SLS - alternate leg tap top of cones x 2 min  4. Hip flexion in sitting x 15 each  5. LAQ's x 15 each  6. Bridging x 15  7. Hip/Knee Flexion in supine with leg in 30 degrees flexion x 15 each  8. Isometric abdominals with red swiss ball x 20  9. Clams x 15  10. Side Lying Hip Abduction x 10   11. Nu-Step  Level 5 x 20 mins    Meek received the following manual therapy techniques:  were applied to the:  for  minutes including:       The patient received the following direct contact modalities after being cleared for contraindications:     The patient received the following supervised modalities after being cleared for contradictions:     Written Home Exercises Provided: ambulation using his cane - he just carry's it - does not use it to help support his RLE.   Pt demo good understanding of the education provided. Meek demonstrated good return demonstration of activities.     Education provided re:  Meek verbalized good understanding of education provided.   No spiritual or educational barriers to learning provided    Assessment     Patient tolerated treatment well.  He fatigues easily secondary to decreased endurance, obesity.  Needs to lose some  weight in order to help himself move easier.   This is a 33 y.o. male referred to outpatient physical therapy and presents with a medical diagnosis of general weakness - s/p SCI and demonstrates limitations as described in the problem list. Pt prognosis is Good. Pt will continue to benefit from skilled outpatient physical therapy to address the deficits listed in the problem list, provide pt/family education and to maximize pt's level of independence in the home and community environment.     Goals as follows:  Long Term GOALS:  In 6 weeks, pt. will:  1. Demonstrate ability to ambulate with least restrictive device x 1000ft in community type environment  2. Demonstrate improvement in LE strength by 1/2 grade  3. Demonstrate ability to perform supine to sit transfer indep.  4. Demonstrate ability to go up and down steps with single railing - 2 feet per step.   5. LEFS score improved fro 95% limitation to 70% limitation   6. Be independent with HEP and SX management         Plan     Continue with established Plan of Care towards PT goals. - Tx 2x/week x 6 weeks     Therapist: Jonathan Favre, PTA  4/2/2019

## 2019-04-03 NOTE — PROGRESS NOTES
Occupational Therapy Daily Treatment Note     Name: Meek Hanson  Clinic Number: [id number]    Therapy Diagnosis:   Encounter Diagnosis   Name Primary?    Muscle weakness Yes     Physician: Cb Warren MD    Visit Date: 4/2/2019    Physician Orders: OT for strengthening  Medical Diagnosis: SCI  Evaluation Date: 03/21/2019  Authorization Period Expiration: 12/31/2019  Plan of Care Certification Period: 6 weeks  Visit #/Visits authorized: 2    Time In: 815  Time Out: 900  Total Billable Time: 45 minutes    Precautions: Standard and Fall    Subjective     Pt reports: no complaints  He was compliant with home exercise program.  Response to previous treatment: tolerating tx   Functional change: none noted    Pain: 3/10  Location: neck and R shoulder      Objective     Meek received hot pack for 15 minutes to R shoulder.    Meek received therapeutic exercises to develop strength and flexibility minutes including:  UBE x 10 mins level 2   3# dowel in B chest press, FF, elevation x 15 each supine on wedge on mat    Meek participated in therapeutic activity to improve R hand grasp/coordination including:  Peg board activity RUE in modified /grasp secondary to weakness   Yellow clothespins tree RUE in grasp/placement/release       Home Exercises Provided and Patient Education Provided     Education provided:   - POC    Written Home Exercises Provided: NA.  Exercises were reviewed and Meek was able to demonstrate them prior to the end of the session.  Meek demonstrated NA understanding of the education provided.     See EMR under Patient Instructions for exercises provided prior visit.    Assessment       Meek is progressing well towards his goals.   Pt prognosis is Good.     Pt will continue to benefit from skilled outpatient physical therapy to address the deficits listed in the problem list box on initial evaluation, provide pt/family education and to maximize pt's level of independence in the home and  community environment.     Pt's spiritual, cultural and educational needs considered and pt agreeable to plan of care and goals.     Anticipated barriers to occupational therapy: none    Goals:  Patient will tolerate BUE strengthening to improve ROM and strength for ADL's.   Patient will perform GM/FM exercises RUE to improve coordination required for ADL's.  Patient to receive education in AE use for UB dressing using buttong hook/zipper pull for improved ADL function.   Patient will receive splint assessment RUE secondary to spasticity of hand.       Plan     Continue OT per established POC    Ansley Lopez, OT

## 2019-04-04 ENCOUNTER — CLINICAL SUPPORT (OUTPATIENT)
Dept: REHABILITATION | Facility: HOSPITAL | Age: 34
End: 2019-04-04
Attending: FAMILY MEDICINE
Payer: MEDICAID

## 2019-04-04 DIAGNOSIS — R29.898 BILATERAL LEG WEAKNESS: ICD-10-CM

## 2019-04-04 DIAGNOSIS — R26.2 DIFFICULTY IN WALKING: Primary | ICD-10-CM

## 2019-04-04 DIAGNOSIS — M62.81 MUSCLE WEAKNESS: Primary | ICD-10-CM

## 2019-04-04 DIAGNOSIS — M62.81 MUSCLE WEAKNESS: ICD-10-CM

## 2019-04-04 PROCEDURE — 97530 THERAPEUTIC ACTIVITIES: CPT | Mod: PN

## 2019-04-04 PROCEDURE — 97110 THERAPEUTIC EXERCISES: CPT | Mod: PN

## 2019-04-04 PROCEDURE — 97010 HOT OR COLD PACKS THERAPY: CPT | Mod: PN

## 2019-04-04 NOTE — PROGRESS NOTES
Physical Therapy Daily Note     Name: Meek Hanson  Clinic Number: [id number]  Diagnosis:   Encounter Diagnoses   Name Primary?    Difficulty in walking Yes    Muscle weakness     Bilateral leg weakness      Physician: Cb Warren MD  Precautions: standard, fall   Visit #: 4 of 12  PTA Visit #: 3  Time In: 9:00 AM   Time Out: 10:00 AM    Subjective     Pt reports: No new c/o's.    Pain Scale: Meek rates pain on a scale of 0-10 to be 2-3 currently in lower back     Objective     Meek received individual therapeutic exercises/Neuro Re-Ed  to develop strength, endurance, posture and core stabilization for 45 minutes includin. Toe Taps x 3 min  2. Staggered standing - one foot up on bench - trunk rotation  3. SLS - alternate leg tap top of cones x 2 min  4. Hip flexion in sitting x 15 each  5. LAQ's x 15 each  6. Bridging x 15  7. Hip/Knee Flexion in supine with leg in 30 degrees flexion x 15 each  8. Isometric abdominals with red swiss ball x 20  9. Clams x 15  10. Side Lying Hip Abduction x 10   11. Nu-Step  Level 5 x 20 mins    Meek received the following manual therapy techniques:  were applied to the:  for  minutes including:       The patient received the following direct contact modalities after being cleared for contraindications:     The patient received the following supervised modalities after being cleared for contradictions:     Written Home Exercises Provided: ambulation using his cane - he just carry's it - does not use it to help support his RLE.   Pt demo good understanding of the education provided. Meke demonstrated good return demonstration of activities.     Education provided re:  Meek verbalized good understanding of education provided.   No spiritual or educational barriers to learning provided    Assessment     Patient tolerated treatment well.  He fatigues easily secondary to decreased endurance, obesity.  Needs to lose  some weight in order to help himself move easier.   This is a 33 y.o. male referred to outpatient physical therapy and presents with a medical diagnosis of general weakness - s/p SCI and demonstrates limitations as described in the problem list. Pt prognosis is Good. Pt will continue to benefit from skilled outpatient physical therapy to address the deficits listed in the problem list, provide pt/family education and to maximize pt's level of independence in the home and community environment.     Goals as follows:  Long Term GOALS:  In 6 weeks, pt. will:  1. Demonstrate ability to ambulate with least restrictive device x 1000ft in community type environment  2. Demonstrate improvement in LE strength by 1/2 grade  3. Demonstrate ability to perform supine to sit transfer indep.  4. Demonstrate ability to go up and down steps with single railing - 2 feet per step.   5. LEFS score improved fro 95% limitation to 70% limitation   6. Be independent with HEP and SX management         Plan     Continue with established Plan of Care towards PT goals. - Tx 2x/week x 6 weeks     Therapist: Jonathan Favre, PTA  4/4/2019

## 2019-04-04 NOTE — PROGRESS NOTES
Occupational Therapy Daily Treatment Note     Name: Meek Hanson  Clinic Number: [id number]    Therapy Diagnosis:   Encounter Diagnosis   Name Primary?    Muscle weakness Yes     Physician: Cb Warren MD    Visit Date: 4/4/2019    Physician Orders: OT for strengthening  Medical Diagnosis: SCI  Evaluation Date: 03/21/2019  Authorization Period Expiration: 12/31/2019  Plan of Care Certification Period: 6 weeks  Visit #/Visits authorized: 3    Time In: 810  Time Out: 900  Total Billable Time: 50 minutes    Precautions: Standard and Fall    Subjective     Pt reports: soreness in neck  He was compliant with home exercise program.  Response to previous treatment: tolerating tx   Functional change: none noted    Pain: 3/10  Location: neck and R shoulder      Objective       Meek received therapeutic exercises to develop strength and flexibility minutes including:  UBE x 10 mins level 2     Meek participated in therapeutic activity to improve R hand grasp/coordination including:  Yellow, red, green clothespins tree RUE in grasp/placement/release  1# dumbbell in R wrist flex, ext, sup/pron, radial/ulnar deviation, elbow flex x 15     Meek received hot pack for 15 minutes to R shoulder.    Home Exercises Provided and Patient Education Provided     Education provided:   - POC    Written Home Exercises Provided: NA.  Exercises were reviewed and Meek was able to demonstrate them prior to the end of the session.  Meek demonstrated NA understanding of the education provided.     See EMR under Patient Instructions for exercises provided prior visit.    Assessment       Meek is progressing well towards his goals.   Pt prognosis is Good.     Pt will continue to benefit from skilled outpatient physical therapy to address the deficits listed in the problem list box on initial evaluation, provide pt/family education and to maximize pt's level of independence in the home and community environment.     Pt's spiritual,  cultural and educational needs considered and pt agreeable to plan of care and goals.     Anticipated barriers to occupational therapy: none    Goals:  Patient will tolerate BUE strengthening to improve ROM and strength for ADL's.   Patient will perform GM/FM exercises RUE to improve coordination required for ADL's.  Patient to receive education in AE use for UB dressing using buttong hook/zipper pull for improved ADL function.   Patient will receive splint assessment RUE secondary to spasticity of hand.       Plan     Continue OT per established POC    Ansley Lopez, OT

## 2019-04-09 ENCOUNTER — CLINICAL SUPPORT (OUTPATIENT)
Dept: REHABILITATION | Facility: HOSPITAL | Age: 34
End: 2019-04-09
Attending: FAMILY MEDICINE
Payer: MEDICAID

## 2019-04-09 DIAGNOSIS — M62.81 MUSCLE WEAKNESS: Primary | ICD-10-CM

## 2019-04-09 DIAGNOSIS — R26.2 DIFFICULTY IN WALKING: Primary | ICD-10-CM

## 2019-04-09 DIAGNOSIS — M62.81 MUSCLE WEAKNESS: ICD-10-CM

## 2019-04-09 PROCEDURE — 97010 HOT OR COLD PACKS THERAPY: CPT | Mod: PN

## 2019-04-09 PROCEDURE — 97110 THERAPEUTIC EXERCISES: CPT | Mod: PN

## 2019-04-09 PROCEDURE — 97530 THERAPEUTIC ACTIVITIES: CPT | Mod: PN

## 2019-04-09 NOTE — PROGRESS NOTES
"                                                    Physical Therapy Daily Note     Name: Meek Hanson  Clinic Number: [id number]  Diagnosis:   Encounter Diagnoses   Name Primary?    Difficulty in walking Yes    Muscle weakness      Physician: Cb Warren MD  Precautions: standard, fall   Visit #: 5  12  PTA Visit #: 4  Time In: 9:00 AM   Time Out: 10:05 AM    Subjective     Pt reports: No new c/o's."I walked a lot this weekend."    Pain Scale: Meek rates pain on a scale of 0-10 to be 2 currently in lower back     Objective     Meek received individual therapeutic exercises/Neuro Re-Ed  to develop strength, endurance, posture and core stabilization for 45 minutes includin. Toe Taps x 3 min  2. Staggered standing - one foot up on bench - trunk rotation  3. SLS - alternate leg tap top of cones x 2 min  4. Hip flexion in sitting x 15 each  5. LAQ's x 15 each  6. Bridging x 15  7. Hip/Knee Flexion in supine with leg in 30 degrees flexion x 15 each  8. Isometric abdominals with red swiss ball x 20  9. Clams x 15  10. Side Lying Hip Abduction x 10   11. Nu-Step  Level 5 x 20 mins  12. Sit to Stand x 15  13. Ball Squeezes x 3 mins    Meek received the following manual therapy techniques:  were applied to the:  for  minutes including:       The patient received the following direct contact modalities after being cleared for contraindications:     The patient received the following supervised modalities after being cleared for contradictions:     Written Home Exercises Provided: ambulation using his cane - he just carry's it - does not use it to help support his RLE.   Pt demo good understanding of the education provided. Meek demonstrated good return demonstration of activities.     Education provided re:  Meek verbalized good understanding of education provided.   No spiritual or educational barriers to learning provided    Assessment     Patient tolerated treatment well.  He fatigues easily secondary " to decreased endurance, obesity.  Needs to lose some weight in order to help himself move easier.   This is a 33 y.o. male referred to outpatient physical therapy and presents with a medical diagnosis of general weakness - s/p SCI and demonstrates limitations as described in the problem list. Pt prognosis is Good. Pt will continue to benefit from skilled outpatient physical therapy to address the deficits listed in the problem list, provide pt/family education and to maximize pt's level of independence in the home and community environment.     Goals as follows:  Long Term GOALS:  In 6 weeks, pt. will:  1. Demonstrate ability to ambulate with least restrictive device x 1000ft in community type environment  2. Demonstrate improvement in LE strength by 1/2 grade  3. Demonstrate ability to perform supine to sit transfer indep.  4. Demonstrate ability to go up and down steps with single railing - 2 feet per step.   5. LEFS score improved fro 95% limitation to 70% limitation   6. Be independent with HEP and SX management         Plan     Continue with established Plan of Care towards PT goals. - Tx 2x/week x 6 weeks     Therapist: Jonathan Favre, PTA  4/9/2019

## 2019-04-09 NOTE — PROGRESS NOTES
Occupational Therapy Daily Treatment Note     Name: Meek Hanson  Clinic Number: [id number]    Therapy Diagnosis:   Encounter Diagnosis   Name Primary?    Muscle weakness Yes     Physician: Cb Warren MD    Visit Date: 4/9/2019    Physician Orders: OT for strengthening  Medical Diagnosis: SCI  Evaluation Date: 03/21/2019  Authorization Period Expiration: 12/31/2019  Plan of Care Certification Period: 6 weeks  Visit #/Visits authorized: 3    Time In: 815  Time Out: 900  Total Billable Time: 45 minutes    Precautions: Standard and Fall    Subjective     Pt reports: soreness in neck  He was compliant with home exercise program.  Response to previous treatment: tolerating tx   Functional change: none noted    Pain: 3/10  Location: neck and R shoulder      Objective     Meek received hot pack for 15 minutes to B shoulders.    Meek received therapeutic exercises to develop strength and flexibility minutes including:  UBE x 10 mins level 2     Meek participated in therapeutic activity to improve R hand grasp/coordination including:  Yellow, red, green clothespins tree RUE in grasp/placement/release  2# dumbbell in R wrist flex, ext, sup/pron, radial/ulnar deviation, elbow flex x 15     Home Exercises Provided and Patient Education Provided     Education provided:   - POC    Written Home Exercises Provided: NA.  Exercises were reviewed and Meek was able to demonstrate them prior to the end of the session.  Meek demonstrated NA understanding of the education provided.     See EMR under Patient Instructions for exercises provided prior visit.    Assessment       Meek is progressing well towards his goals.   Pt prognosis is Good.     Pt will continue to benefit from skilled outpatient occupatoinal therapy to address the deficits listed in the problem list box on initial evaluation, provide pt/family education and to maximize pt's level of independence in the home and community environment.     Pt's spiritual,  cultural and educational needs considered and pt agreeable to plan of care and goals.     Anticipated barriers to occupational therapy: none    Goals:  Patient will tolerate BUE strengthening to improve ROM and strength for ADL's.   Patient will perform GM/FM exercises RUE to improve coordination required for ADL's.  Patient to receive education in AE use for UB dressing using buttong hook/zipper pull for improved ADL function.   Patient will receive splint assessment RUE secondary to spasticity of hand.       Plan     Continue OT per established POC    Ansley Lopez, OT

## 2019-04-11 ENCOUNTER — CLINICAL SUPPORT (OUTPATIENT)
Dept: REHABILITATION | Facility: HOSPITAL | Age: 34
End: 2019-04-11
Attending: FAMILY MEDICINE
Payer: MEDICAID

## 2019-04-11 DIAGNOSIS — M62.81 MUSCLE WEAKNESS: ICD-10-CM

## 2019-04-11 DIAGNOSIS — M62.81 MUSCLE WEAKNESS: Primary | ICD-10-CM

## 2019-04-11 DIAGNOSIS — R26.2 DIFFICULTY IN WALKING: Primary | ICD-10-CM

## 2019-04-11 PROCEDURE — 97110 THERAPEUTIC EXERCISES: CPT | Mod: PN

## 2019-04-11 PROCEDURE — 97010 HOT OR COLD PACKS THERAPY: CPT | Mod: PN

## 2019-04-11 PROCEDURE — 97530 THERAPEUTIC ACTIVITIES: CPT | Mod: PN

## 2019-04-11 NOTE — PROGRESS NOTES
Physical Therapy Daily Note     Name: Meek Hanson  Clinic Number: [id number]  Diagnosis:   Encounter Diagnoses   Name Primary?    Difficulty in walking Yes    Muscle weakness      Physician: Cb Warren MD  Precautions: standard, fall   Visit #: 6  12  PTA Visit #: 5  Time In: 9:00 AM   Time Out: 10:10 AM    Subjective     Pt reports: No new c/o's.   Pain Scale: Meek rates pain on a scale of 0-10 to be 2 currently in lower back     Objective     Meek received individual therapeutic exercises/Neuro Re-Ed  to develop strength, endurance, posture and core stabilization for 45 minutes includin. Toe Taps x 3 min  2. Staggered standing - one foot up on bench - trunk rotation  3. SLS - alternate leg tap top of cones x 2 min  4. Hip flexion in sitting x 15 each  5. LAQ's x 15 each  6. Bridging x 15  7. Hip/Knee Flexion in supine with leg in 30 degrees flexion x 15 each  8. Isometric abdominals with red swiss ball x 20  9. Clams x 15  10. Side Lying Hip Abduction x 10   11. Nu-Step  Level 5 x 20 mins  12. Sit to Stand x 15  13. Ball Squeezes x 3 mins  14. SLR 10 x 2    Meek received the following manual therapy techniques:  were applied to the:  for  minutes including:       The patient received the following direct contact modalities after being cleared for contraindications:     The patient received the following supervised modalities after being cleared for contradictions:     Written Home Exercises Provided: ambulation using his cane - he just carry's it - does not use it to help support his RLE.   Pt demo good understanding of the education provided. Meek demonstrated good return demonstration of activities.     Education provided re:  Meek verbalized good understanding of education provided.   No spiritual or educational barriers to learning provided    Assessment     Patient tolerated treatment well.  He fatigues easily secondary to decreased  endurance, obesity.  Needs to lose some weight in order to help himself move easier.   This is a 33 y.o. male referred to outpatient physical therapy and presents with a medical diagnosis of general weakness - s/p SCI and demonstrates limitations as described in the problem list. Pt prognosis is Good. Pt will continue to benefit from skilled outpatient physical therapy to address the deficits listed in the problem list, provide pt/family education and to maximize pt's level of independence in the home and community environment.     Goals as follows:  Long Term GOALS:  In 6 weeks, pt. will:  1. Demonstrate ability to ambulate with least restrictive device x 1000ft in community type environment  2. Demonstrate improvement in LE strength by 1/2 grade  3. Demonstrate ability to perform supine to sit transfer indep.  4. Demonstrate ability to go up and down steps with single railing - 2 feet per step.   5. LEFS score improved fro 95% limitation to 70% limitation   6. Be independent with HEP and SX management         Plan     Continue with established Plan of Care towards PT goals. - Tx 2x/week x 6 weeks     Therapist: Jonathan Favre, PTA  4/11/2019

## 2019-04-12 NOTE — PROGRESS NOTES
Occupational Therapy Daily Treatment Note     Name: Meek Hanson  Clinic Number: [id number]    Therapy Diagnosis:   Encounter Diagnosis   Name Primary?    Muscle weakness Yes     Physician: Cb Warren MD    Visit Date: 4/11/2019    Physician Orders: OT for strengthening  Medical Diagnosis: SCI  Evaluation Date: 03/21/2019  Authorization Period Expiration: 12/31/2019  Plan of Care Certification Period: 6 weeks  Visit #/Visits authorized: 4    Time In: 815  Time Out: 900  Total Billable Time: 45 minutes    Precautions: Standard and Fall    Subjective     Pt reports: soreness in neck  He was compliant with home exercise program.  Response to previous treatment: tolerating tx   Functional change: none noted    Pain: 3/10  Location: neck and R shoulder      Objective       Meek received therapeutic exercises to develop strength and flexibility minutes including:  UBE x 10 mins level 2     Meek participated in therapeutic activity to improve R hand grasp/coordination including:  Yellow, red, green clothespins tree RUE in grasp/placement/release  2# dumbbell in R wrist flex, ext, sup/pron, radial/ulnar deviation, elbow flex x 15     Meek received hot pack for 15 minutes to B shoulders.    Home Exercises Provided and Patient Education Provided     Education provided:   - POC    Written Home Exercises Provided: NA.  Exercises were reviewed and Meek was able to demonstrate them prior to the end of the session.  Meek demonstrated NA understanding of the education provided.     See EMR under Patient Instructions for exercises provided prior visit.    Assessment       Meek is progressing well towards his goals.   Pt prognosis is Good.     Pt will continue to benefit from skilled outpatient occupatoinal therapy to address the deficits listed in the problem list box on initial evaluation, provide pt/family education and to maximize pt's level of independence in the home and community environment.     Pt's spiritual,  cultural and educational needs considered and pt agreeable to plan of care and goals.     Anticipated barriers to occupational therapy: none    Goals:  Patient will tolerate BUE strengthening to improve ROM and strength for ADL's.   Patient will perform GM/FM exercises RUE to improve coordination required for ADL's.  Patient to receive education in AE use for UB dressing using buttong hook/zipper pull for improved ADL function.   Patient will receive splint assessment RUE secondary to spasticity of hand.       Plan     Continue OT per established POC    Ansley Lopez, OT

## 2019-04-16 ENCOUNTER — CLINICAL SUPPORT (OUTPATIENT)
Dept: REHABILITATION | Facility: HOSPITAL | Age: 34
End: 2019-04-16
Attending: FAMILY MEDICINE
Payer: MEDICAID

## 2019-04-16 DIAGNOSIS — M62.81 MUSCLE WEAKNESS: ICD-10-CM

## 2019-04-16 DIAGNOSIS — M62.81 MUSCLE WEAKNESS: Primary | ICD-10-CM

## 2019-04-16 DIAGNOSIS — R29.898 BILATERAL LEG WEAKNESS: Primary | ICD-10-CM

## 2019-04-16 DIAGNOSIS — R26.2 DIFFICULTY IN WALKING: ICD-10-CM

## 2019-04-16 PROCEDURE — 97010 HOT OR COLD PACKS THERAPY: CPT | Mod: PN

## 2019-04-16 PROCEDURE — 97110 THERAPEUTIC EXERCISES: CPT | Mod: PN

## 2019-04-16 PROCEDURE — 97112 NEUROMUSCULAR REEDUCATION: CPT | Mod: PN

## 2019-04-16 PROCEDURE — 97116 GAIT TRAINING THERAPY: CPT | Mod: PN

## 2019-04-16 NOTE — PROGRESS NOTES
Occupational Therapy Daily Treatment Note     Name: Meek Hanson  Clinic Number: [id number]    Therapy Diagnosis:   Encounter Diagnosis   Name Primary?    Muscle weakness Yes     Physician: Cb Warren MD    Visit Date: 4/16/2019    Physician Orders: OT for strengthening  Medical Diagnosis: SCI  Evaluation Date: 03/21/2019  Authorization Period Expiration: 12/31/2019  Plan of Care Certification Period: 6 weeks  Visit #/Visits authorized: 4    Time In: 810  Time Out: 900  Total Billable Time:50 minutes    Precautions: Standard and Fall    Subjective     Pt reports: soreness in neck  He was compliant with home exercise program.  Response to previous treatment: tolerating tx   Functional change: none noted    Pain: 3/10  Location: neck and R shoulder      Objective       Meek received therapeutic exercises to develop strength and flexibility minutes including:  UBE x 10 mins level 2   2# dumbbell in R wrist flex, ext, sup/pron, radial/ulnar deviation, elbow flex x 15   3# dowel BUE chest press, FF, elevation x 15     Meek received hot pack for 15 minutes to B shoulders.    Home Exercises Provided and Patient Education Provided     Education provided:   - POC    Written Home Exercises Provided: NA.  Exercises were reviewed and Meek was able to demonstrate them prior to the end of the session.  Meek demonstrated NA understanding of the education provided.     See EMR under Patient Instructions for exercises provided prior visit.    Assessment       Meek is progressing well towards his goals.   Pt prognosis is Good.     Pt will continue to benefit from skilled outpatient occupatoinal therapy to address the deficits listed in the problem list box on initial evaluation, provide pt/family education and to maximize pt's level of independence in the home and community environment.     Pt's spiritual, cultural and educational needs considered and pt agreeable to plan of care and goals.     Anticipated barriers to  occupational therapy: none    Goals:  Patient will tolerate BUE strengthening to improve ROM and strength for ADL's.   Patient will perform GM/FM exercises RUE to improve coordination required for ADL's.  Patient to receive education in AE use for UB dressing using buttong hook/zipper pull for improved ADL function.   Patient will receive splint assessment RUE secondary to spasticity of hand.       Plan     Continue OT per established POC    Ansley Lopez OT

## 2019-04-16 NOTE — PROGRESS NOTES
"                                                    Physical Therapy Daily Note     Name: Meek Hanson  Clinic Number: [id number]  Diagnosis:   Encounter Diagnoses   Name Primary?    Bilateral leg weakness Yes    Muscle weakness     Difficulty in walking      Physician: Cb Warren MD  Precautions: standard, fall   Visit #: 7 of 12  PTA Visit #: 5  Time In: 9:00 AM   Time Out: 10:20 AM    Subjective     Pt reports: No new c/o's. Meek continues to exercise at home, walk as much as he can.   Pain Scale: Meek rates pain on a scale of 0-10 to be 2 currently in lower back     Objective     Meek received individual therapeutic exercises/Neuro Re-Ed  to develop strength, endurance, posture and core stabilization for 45 minutes including: Performed ex in bold     1. Toe Taps x 3 min  2. Staggered standing - one foot up on bench - trunk rotation  3. SLS - alternate leg tap top of cones x 2 min  4. Hip flexion in sitting x 15 each  5. LAQ's x 15 each  6. Bridging x 15  7. Hip/Knee Flexion in supine with leg in 30 degrees flexion x 15 each  8. Isometric abdominals with red swiss ball x 20  9. Clams x 15  10. Side Lying Hip Abduction x 10   11. Nu-Step  Level 5 x 20 mins  12. Sit to Stand x 15  13. Ball Squeezes x 3 mins  14. SLR 10 x 2      Gait Training:  Used Red Tband to facilitate dorsiflexion of Right foot; ambulated without use of AD and SBA - 200ft then up and down 4 steps using single railing; ambulated 50ft back to chair for seated rest break. Sit >Stand from regular chair with left UE assit - took two attempts to rise from chair; Ambulated additional 200ft without AD and SBA focusing on weight shifting to clear right foot.     Neuro-Re-Ed  Performed side stepping over dowel chai on floor - again without use of AD and with SBA to CGA x 1 min x 3  Performed forward/backward stepping over dowel chai on floor - No AD, SBA for forward step and CGA for backward stepping; 1 min x 2  Performed "box" stepping with " CGA 1 min x 3      Meek received the following manual therapy techniques:  were applied to the:  for  minutes including:       The patient received the following direct contact modalities after being cleared for contraindications:     The patient received the following supervised modalities after being cleared for contradictions:     Written Home Exercises Provided: ambulation using his cane - he just carry's it - does not use it to help support his RLE.   Pt demo good understanding of the education provided. Meek demonstrated good return demonstration of activities.     Education provided re:  Meek verbalized good understanding of education provided.   No spiritual or educational barriers to learning provided    Assessment     Patient tolerated treatment well.  He fatigues easily secondary to decreased endurance, obesity.  Needs to lose some weight in order to help himself move easier. Did well with gait and neuro-reeducation today; needs to build confidence with ambulation.as well as increase his endurance.   This is a 33 y.o. male referred to outpatient physical therapy and presents with a medical diagnosis of general weakness - s/p SCI and demonstrates limitations as described in the problem list. Pt prognosis is Good. Pt will continue to benefit from skilled outpatient physical therapy to address the deficits listed in the problem list, provide pt/family education and to maximize pt's level of independence in the home and community environment.     Goals as follows:  Long Term GOALS:  In 6 weeks, pt. will:  1. Demonstrate ability to ambulate with least restrictive device x 1000ft in community type environment  2. Demonstrate improvement in LE strength by 1/2 grade  3. Demonstrate ability to perform supine to sit transfer indep.  4. Demonstrate ability to go up and down steps with single railing - 2 feet per step.   5. LEFS score improved fro 95% limitation to 70% limitation   6. Be independent with HEP and  SX management         Plan     Continue with established Plan of Care towards PT goals. - Tx 2x/week x 2 more weeks then re-assess.     Therapist: Melia Sampson, PT  4/16/2019

## 2019-04-18 ENCOUNTER — CLINICAL SUPPORT (OUTPATIENT)
Dept: REHABILITATION | Facility: HOSPITAL | Age: 34
End: 2019-04-18
Attending: FAMILY MEDICINE
Payer: MEDICAID

## 2019-04-18 DIAGNOSIS — R26.2 DIFFICULTY IN WALKING: Primary | ICD-10-CM

## 2019-04-18 DIAGNOSIS — M62.81 MUSCLE WEAKNESS: Primary | ICD-10-CM

## 2019-04-18 DIAGNOSIS — M62.81 MUSCLE WEAKNESS: ICD-10-CM

## 2019-04-18 PROCEDURE — 97110 THERAPEUTIC EXERCISES: CPT | Mod: PN

## 2019-04-18 PROCEDURE — 97010 HOT OR COLD PACKS THERAPY: CPT | Mod: PN

## 2019-04-18 PROCEDURE — 97530 THERAPEUTIC ACTIVITIES: CPT | Mod: PN

## 2019-04-18 NOTE — PROGRESS NOTES
"                                                    Physical Therapy Daily Note     Name: Meek Hanson  Clinic Number: [id number]  Diagnosis:   Encounter Diagnoses   Name Primary?    Difficulty in walking Yes    Muscle weakness      Physician: Cb Warren MD  Precautions: standard, fall   Visit #: 8 of 12  PTA Visit #: 1  Time In: 9:00 AM   Time Out: 10:00 AM    Subjective     Pt reports: No new c/o's. Meek continues to exercise at home, walk as much as he can.   Pain Scale: Meek rates pain on a scale of 0-10 to be 3 currently in lower back     Objective     Meek received individual therapeutic exercises/Neuro Re-Ed  to develop strength, endurance, posture and core stabilization for 45 minutes including: Performed ex in bold     1. Toe Taps x 3 min  2. Staggered standing - one foot up on bench - trunk rotation  3. SLS - alternate leg tap top of cones x 2 min  4. Hip flexion in sitting x 15 each  5. LAQ's x 15 each  6. Bridging x 15  7. Hip/Knee Flexion in supine with leg in 30 degrees flexion x 15 each  8. Isometric abdominals with red swiss ball x 20  9. Clams x 15  10. Side Lying Hip Abduction x 10   11. Nu-Step  Level 5 x 35 mins  12. Sit to Stand x 15  13. Ball Squeezes x 3 mins  14. SLR 10 x 2    DNP  Gait Training:  Used Red Tband to facilitate dorsiflexion of Right foot; ambulated without use of AD and SBA - 200ft then up and down 4 steps using single railing; ambulated 50ft back to chair for seated rest break. Sit >Stand from regular chair with left UE assit - took two attempts to rise from chair; Ambulated additional 200ft without AD and SBA focusing on weight shifting to clear right foot.     Neuro-Re-Ed  Performed side stepping over dowel chai on floor - again without use of AD and with SBA to CGA x 1 min x 3  Performed forward/backward stepping over dowel chai on floor - No AD, SBA for forward step and CGA for backward stepping; 1 min x 2  Performed "box" stepping with CGA 1 min x 3      Meek " received the following manual therapy techniques:  were applied to the:  for  minutes including:       The patient received the following direct contact modalities after being cleared for contraindications:     The patient received the following supervised modalities after being cleared for contradictions:     Written Home Exercises Provided: ambulation using his cane - he just carry's it - does not use it to help support his RLE.   Pt demo good understanding of the education provided. Meek demonstrated good return demonstration of activities.     Education provided re:  Meek verbalized good understanding of education provided.   No spiritual or educational barriers to learning provided    Assessment     Patient tolerated treatment well.  He fatigues easily secondary to decreased endurance, obesity.  Needs to lose some weight in order to help himself move easier. Did well with gait and neuro-reeducation today; needs to build confidence with ambulation.as well as increase his endurance.   This is a 33 y.o. male referred to outpatient physical therapy and presents with a medical diagnosis of general weakness - s/p SCI and demonstrates limitations as described in the problem list. Pt prognosis is Good. Pt will continue to benefit from skilled outpatient physical therapy to address the deficits listed in the problem list, provide pt/family education and to maximize pt's level of independence in the home and community environment.     Goals as follows:  Long Term GOALS:  In 6 weeks, pt. will:  1. Demonstrate ability to ambulate with least restrictive device x 1000ft in community type environment  2. Demonstrate improvement in LE strength by 1/2 grade  3. Demonstrate ability to perform supine to sit transfer indep.  4. Demonstrate ability to go up and down steps with single railing - 2 feet per step.   5. LEFS score improved fro 95% limitation to 70% limitation   6. Be independent with HEP and SX management         Plan      Continue with established Plan of Care towards PT goals. - Tx 2x/week x 2 more weeks then re-assess.     Therapist: Jonathan Favre, PTA  4/18/2019

## 2019-04-18 NOTE — PROGRESS NOTES
Occupational Therapy Daily Treatment Note     Name: Meek Hanson  Clinic Number: [id number]    Therapy Diagnosis:   Encounter Diagnosis   Name Primary?    Muscle weakness Yes     Physician: Cb Warren MD    Visit Date: 4/18/2019    Physician Orders: OT for strengthening  Medical Diagnosis: SCI  Evaluation Date: 03/21/2019  Authorization Period Expiration: 12/31/2019  Plan of Care Certification Period: 6 weeks  Visit #/Visits authorized: 8    Time In: 800  Time Out: 900  Total Billable Time:55 minutes    Precautions: Standard and Fall    Subjective     Pt reports: stiffness in neck/shoulders  He was compliant with home exercise program.  Response to previous treatment: tolerating tx   Functional change: none noted    Pain: 3/10  Location: neck and R shoulder      Objective       Meek received therapeutic exercises to develop strength and flexibility minutes including:  UBE x 10 mins level 3   2# dumbbell in R wrist flex, ext, sup/pron, radial/ulnar deviation, elbow flex x 15     Meek participated in therapeutic activity to improve FM coordination BUE consisting of:  R and L UE placing small coins into slots in small connect four activity  Grasp and release of small geometric shapes using tongs RUE placing in matching geometric cutouts    Meek received hot pack for 15 minutes to B shoulders.    Home Exercises Provided and Patient Education Provided     Education provided:   - POC    Written Home Exercises Provided: NA.  Exercises were reviewed and Meek was able to demonstrate them prior to the end of the session.  Meek demonstrated NA understanding of the education provided.     See EMR under Patient Instructions for exercises provided prior visit.    Assessment       Meek is progressing well towards his goals.   Pt prognosis is Good.     Pt will continue to benefit from skilled outpatient occupatoinal therapy to address the deficits listed in the problem list box on initial evaluation, provide  pt/family education and to maximize pt's level of independence in the home and community environment.     Pt's spiritual, cultural and educational needs considered and pt agreeable to plan of care and goals.     Anticipated barriers to occupational therapy: none    Goals:  Patient will tolerate BUE strengthening to improve ROM and strength for ADL's.   Patient will perform GM/FM exercises RUE to improve coordination required for ADL's.  Patient to receive education in AE use for UB dressing using buttong hook/zipper pull for improved ADL function.   Patient will receive splint assessment RUE secondary to spasticity of hand.       Plan     Continue OT per established POC    Ansley Lopez, OT

## 2019-04-29 ENCOUNTER — TELEPHONE (OUTPATIENT)
Dept: FAMILY MEDICINE | Facility: CLINIC | Age: 34
End: 2019-04-29

## 2019-04-29 RX ORDER — MONTELUKAST SODIUM 10 MG/1
10 TABLET ORAL NIGHTLY
Qty: 30 TABLET | Refills: 0 | Status: SHIPPED | OUTPATIENT
Start: 2019-04-29 | End: 2019-05-22 | Stop reason: SDUPTHER

## 2019-04-29 NOTE — TELEPHONE ENCOUNTER
----- Message from Bello Elizondo sent at 4/29/2019  8:42 AM CDT -----  Contact: Sister, Elayne Holly want to know if you can call patient something in for cough and congestion if so please send to [facility], any questions please call back at [phone] (home)     [facility] [address] [place] [address] AT [address]  [address] [address]  Phone: [phone] Fax: [phone]

## 2019-05-07 ENCOUNTER — OFFICE VISIT (OUTPATIENT)
Dept: CARDIOLOGY | Facility: CLINIC | Age: 34
End: 2019-05-07
Payer: MEDICAID

## 2019-05-07 VITALS
TEMPERATURE: 98 F | WEIGHT: 305 LBS | SYSTOLIC BLOOD PRESSURE: 116 MMHG | BODY MASS INDEX: 46.23 KG/M2 | RESPIRATION RATE: 18 BRPM | HEART RATE: 24 BPM | DIASTOLIC BLOOD PRESSURE: 68 MMHG | HEIGHT: 68 IN | OXYGEN SATURATION: 97 %

## 2019-05-07 DIAGNOSIS — R60.0 PERIPHERAL EDEMA: Primary | ICD-10-CM

## 2019-05-07 DIAGNOSIS — G47.19 EXCESSIVE DAYTIME SLEEPINESS: ICD-10-CM

## 2019-05-07 DIAGNOSIS — S12.300S: ICD-10-CM

## 2019-05-07 DIAGNOSIS — E74.39 GLUCOSE INTOLERANCE: ICD-10-CM

## 2019-05-07 DIAGNOSIS — D64.9 ANEMIA, UNSPECIFIED TYPE: ICD-10-CM

## 2019-05-07 DIAGNOSIS — Z78.9 EXCESSIVE CAFFEINE INTAKE: ICD-10-CM

## 2019-05-07 DIAGNOSIS — E66.01 MORBID OBESITY: ICD-10-CM

## 2019-05-07 DIAGNOSIS — G47.9 SLEEP DISORDER: ICD-10-CM

## 2019-05-07 PROBLEM — S12.300A: Status: ACTIVE | Noted: 2019-05-07

## 2019-05-07 PROCEDURE — 99205 OFFICE O/P NEW HI 60 MIN: CPT | Mod: S$PBB,,, | Performed by: INTERNAL MEDICINE

## 2019-05-07 PROCEDURE — 99205 PR OFFICE/OUTPT VISIT, NEW, LEVL V, 60-74 MIN: ICD-10-PCS | Mod: S$PBB,,, | Performed by: INTERNAL MEDICINE

## 2019-05-07 PROCEDURE — 99999 PR PBB SHADOW E&M-EST. PATIENT-LVL III: CPT | Mod: PBBFAC,,, | Performed by: INTERNAL MEDICINE

## 2019-05-07 PROCEDURE — 99999 PR PBB SHADOW E&M-EST. PATIENT-LVL III: ICD-10-PCS | Mod: PBBFAC,,, | Performed by: INTERNAL MEDICINE

## 2019-05-07 PROCEDURE — 99213 OFFICE O/P EST LOW 20 MIN: CPT | Mod: PBBFAC,PN | Performed by: INTERNAL MEDICINE

## 2019-05-07 RX ORDER — AMOXICILLIN 500 MG
2 CAPSULE ORAL DAILY
COMMUNITY

## 2019-05-07 RX ORDER — MULTIVITAMIN
2 TABLET ORAL DAILY
COMMUNITY

## 2019-05-07 NOTE — PATIENT INSTRUCTIONS
Recommended Mediterranean dietEating Heart-Healthy Food: Using the DASH Plan  Eating for your heart doesnt have to be hard or boring. You just need to know how to make healthier choices. The DASH eating plan has been developed to help you do just that. DASH stands for Dietary Approaches to Stop Hypertension. It is a plan that has been proven to be healthier for your heart and to lower your risk for high blood pressure. It can also help lower your risk for cancer, heart disease, osteoporosis, and diabetes.  Choosing from Each Food Group  Choose foods from each of the food groups below each day. Try to get the recommended number of servings for each food group. The serving numbers are based on a diet of 2,000 calories a day. Talk to your doctor if youre unsure about your calorie needs.  Grains   Servings: 7-8 a day  A serving is:  · 1 slice bread  · 1 ounce dry cereal  · half a cup cooked rice or pasta  Best choices: Whole grains and any grains high in fiber.  Vegetables   Servings: 4-5 a day  A serving is:  · 1 cup raw leafy vegetable  · Half a cup cooked vegetable  · Three-quarter cup vegetable juice  Best choices: Fresh or frozen vegetable prepared without too much added salt or fat.    Fruits   Servings: 4-5 a day  A serving is:  · Three-quarter cup fruit juice  · 1 medium fruit  · One-quarter cup dried fruit  · One-half cup fresh, frozen, or canned fruit  Best choices: A variety of fresh fruits of different colors. Whole fruits are a much better choice than fruit juices.  Low-fat or Fat Free Dairy   Servings: 2-3 a day  A serving is:  · 8 ounces milk  · 1 cup yogurt  · One and a half ounces cheese  Best choices: Skim or 1% milk, low-fat or fat free yogurt or buttermilk, and low-fat cheeses.       Meat, Poultry, Fish   Servings: 2 or fewer a day  A serving is:  · 3 ounces cooked meat, poultry, or fish  Best choices: Lean meats and fish. Trim away visible fat. Broil, roast, or boil instead of frying. Remove skin  from poultry before eating.  Nuts, Seeds, Beans   Servings: 4-5 a week  A serving is:  · One third cup nuts (or one and a half ounces)  · 2 tablespoons sunflower seeds  · Half a cup cooked beans  Best choices: Dry roasted nuts with no salt added, lentils, kidney beans, garbanzo beans, and whole mcmahan beans.    Fats and Oils   Servings: 2 a day  A serving is:  · 1 teaspoon vegetable oil  · 1 teaspoon soft margarine  · 1 tablespoon low-fat mayonnaise  · 1 teaspoon regular mayonnaise  · 2 tablespoons light salad dressing  · 1 tablespoon regular salad dressing  Best choices: Monounsaturated and polyunsaturated fats such as olive, canola, or safflower oil.  Sweets   Servings: 5 a week or fewer  A serving is:  · 1 tablespoon sugar, maple syrup, or honey  · 1 tablespoon jam or jelly  · 1 half-ounce jelly beans (about 15)  · 8 ounces lemonade  Best choices: Dried fruit can be a satisfying sweet. Choose low-fat sweets when possible. And watch your serving sizes!       Aerobic Exercise for a Healthy Heart  Exercise is a lot more than an energy booster and a stress reliever. It also strengthens your heart muscle, lowers your blood pressure and blood cholesterol, and burns calories.      Remember, some activity is better than none.     Choose an Aerobic Activity  Choose a nonstop activity that makes your heart and lungs work harder than they do when you rest or walk normally. This aerobic exercise can improve the way your heart and other muscles use oxygen. Make it fun by exercising with a friend and choosing an activity you enjoy. Here are some ideas:  · Walking  · Swimming  · Bicycling  · Stair climbing  · Dancing  · Jogging  Exercise Regularly  If you havent been exercising regularly,  get your doctors okay first. Then start slowly.  Here are some tips:  · Begin exercising 3 times a week for 5-10 minutes at a time.  · When you feel comfortable, add a few minutes each week.  · Slowly build up to exercising 3-4 times each  week for 20-40 minutes. Aim for a total of 150 or more minutes a week.  · Be sure to carry your nitroglycerin with you when you exercise.  · If you get angina when youre exercising, stop what youre doing, take your nitroglycerin, and call your doctor.  © 0235-2517 [employer], [address] [place] [address], [place] [address]. All rights reserved. This information is not intended as a substitute for professional medical care. Always follow your healthcare professional's instructions.  Losing Weight (Cardiovascular)  Excess weight is a major risk factor for heart disease. Losing weight may help keep your arteries open so that your heart can get the oxygen-rich blood it needs. Weight loss can also help lower your blood pressure and reduce your risk for diabetes. All in all, losing weight makes you healthier.          Exercise with a friend. When activity is fun, you're more likely to stick with it.        Calories and Weight Loss  Calories are the fuel your body burns for energy. You get the calories you need from the food you eat. For healthy weight loss, women should eat at least 1,200 calories a day, men at least 1,500.    When you eat more calories than you need, your body stores the extra calories as fat. One pound of fat equals 3,500 calories.    To lose weight, try to burn 500 calories a day more than you eat. To do this, eat 250 calories less each day. Add activity to burn the other 250 calories. Walking 21/2 miles burns about 250 calories.    Eat a variety of healthy foods. Its the best way to make calories count.     Tips for losing weight:  Drink 8 to 10 glasses of water a day.    Dont skip meals. Instead, eat smaller portions.       Brisk Activity Is Best  Brisk activity gets your heart pumping faster. It makes your heart healthier. Its also the best way to burn calories. In fact, your body may keep burning calories for hours after you stop a brisk activity.    Begin by walking 10 minutes most  days.    Add more time and speed to your walk. Build up as you feel able.    Try to walk briskly at least 30 minutes most days. If needed, you can break this into 2 shorter sessions.     Check off the ideas below that you could try to make your day more active:    Take the stairs instead of the elevator.    Park your car farther away and walk.    Ride a bike to work or to the store.    Walk laps around the mall.  Leg Swelling in Both Legs    Swelling of the feet, ankles, and legs is called edema. It is caused by excess fluid that has collected in the tissues. Extra fluid in the body settles in the lowest part because of gravity. This is why the legs and feet are most affected.  Some of the causes for edema include:  · Disease of the heart like congestive heart failure  · Standing or sitting for long periods of time  · Infection of the feet or legs  · Blood pooling in the veins of your legs (venous insufficiency)  · Dilated veins in your lower leg (varicose veins)  · Garters or other clothing that is tight on your legs. This will cause blood to pool in your legs because the clothing limits blood flow.  · Some medicines such as hormones like birth control pills, some blood pressure medicines like calcium channel blockers (amlodipine) and steroids, some antidepressants like MAO inhibitors and tricyclics  · Menstrual periods that cause you to retain fluids  · Many types of renal disease  · Liver failure or cirrhosis  · Pregnancy, some swelling is normal, but a sudden increase in leg swelling or weight gain can be a sign of a dangerous complication of pregnancy  · Poor nutrition  · Thyroid disease  Medical treatment will depend on what is causing the swelling in your legs. Your healthcare provider may prescribe water pills (diuretics) to get rid of the extra fluid.  Home care  Follow these guidelines when caring for yourself at home:  · Don't wear clothing like garters that is tight on your legs.  · Keep your legs  up while lying or sitting.  · If infection, injury, or recent surgery is causing the swelling, stay off your legs as much as possible until symptoms get better.  · If your healthcare provider says that your leg swelling is caused by venous insufficiency or varicose veins, don't sit or  one place for long periods of time. Take breaks and walk about every few hours. Brisk walking is a good exercise. It helps circulate the blood that has collected in your leg. Talk with your provider about using support stockings to stop daytime leg swelling.  · If your provider says that heart disease is causing your leg swelling, follow a low-salt diet to stop extra fluid from staying in your body. You may also need medicine.  Follow-up care  Follow up with your healthcare provider, or as advised.  When to seek medical advice  Call your healthcare provider right away if any of these occur:  · New shortness of breath or chest pain  · Shortness of breath or chest pain that gets worse  · Swelling in both legs or ankles that gets worse  · Swelling of the abdomen  · Redness, warmth, or swelling in one leg  · Fever of 100.4ºF (38ºC) or higher, or as directed by your healthcare provider  · Yellow color to your skin or eyes  · Rapid, unexplained weight gain  · Having to sleep upright or use an increased number of pillows  Date Last Reviewed: 3/31/2016  © 8307-5436 [employer]. [address] [place] [address], [place] [address]. All rights reserved. This information is not intended as a substitute for professional medical care. Always follow your healthcare professional's instructions.

## 2019-05-07 NOTE — LETTER
May 7, 2019      Cb Warren MD  [address]  [place] [address]           [facility] - Cardiology  [address]  [place] [address]  Phone: [phone]  Fax: [phone]          Patient: Meek Hanson   MR Number: [id number]   YOB: 1985   Date of Visit: 5/7/2019       Dear Dr. Cb Warren:    Thank you for referring Meek Hanson to me for evaluation. Attached you will find relevant portions of my assessment and plan of care.    If you have questions, please do not hesitate to call me. I look forward to following Meek Hanson along with you.    Sincerely,    Ramin Silverio MD    Enclosure  CC:  No Recipients    If you would like to receive this communication electronically, please contact [email or web address] or [phone] to request more information on [employer] access.    For providers and/or their staff who would like to refer a patient to [facility], please contact us through our one-stop-shop provider referral line, [facility], at [phone].    If you feel you have received this communication in error or would no longer like to receive these types of communications, please e-mail [email or web address]
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
## 2019-05-14 ENCOUNTER — HOSPITAL ENCOUNTER (EMERGENCY)
Facility: HOSPITAL | Age: 34
Discharge: HOME | End: 2019-05-15
Attending: FAMILY MEDICINE
Payer: MEDICAID

## 2019-05-14 DIAGNOSIS — R07.9 CHEST PAIN: ICD-10-CM

## 2019-05-14 PROCEDURE — 99284 EMERGENCY DEPT VISIT MOD MDM: CPT | Mod: 25

## 2019-05-14 PROCEDURE — 93010 EKG 12-LEAD: ICD-10-PCS | Mod: ,,, | Performed by: INTERNAL MEDICINE

## 2019-05-14 PROCEDURE — 93005 ELECTROCARDIOGRAM TRACING: CPT

## 2019-05-14 PROCEDURE — 93010 ELECTROCARDIOGRAM REPORT: CPT | Mod: ,,, | Performed by: INTERNAL MEDICINE

## 2019-05-15 ENCOUNTER — HOSPITAL ENCOUNTER (OUTPATIENT)
Dept: CARDIOLOGY | Facility: HOSPITAL | Age: 34
Discharge: HOME | End: 2019-05-15
Attending: INTERNAL MEDICINE
Payer: MEDICAID

## 2019-05-15 VITALS
DIASTOLIC BLOOD PRESSURE: 61 MMHG | BODY MASS INDEX: 46.23 KG/M2 | HEIGHT: 68 IN | HEART RATE: 78 BPM | TEMPERATURE: 99 F | RESPIRATION RATE: 12 BRPM | WEIGHT: 305 LBS | OXYGEN SATURATION: 95 % | SYSTOLIC BLOOD PRESSURE: 112 MMHG

## 2019-05-15 DIAGNOSIS — R60.0 PERIPHERAL EDEMA: ICD-10-CM

## 2019-05-15 LAB — TROPONIN I SERPL DL<=0.01 NG/ML-MCNC: <0.01 NG/ML (ref 0.02–0.5)

## 2019-05-15 PROCEDURE — 25500020 PHARM REV CODE 255: Performed by: INTERNAL MEDICINE

## 2019-05-15 PROCEDURE — 84484 ASSAY OF TROPONIN QUANT: CPT

## 2019-05-15 PROCEDURE — 25000003 PHARM REV CODE 250: Performed by: FAMILY MEDICINE

## 2019-05-15 PROCEDURE — 93306 TRANSTHORACIC ECHO (TTE) COMPLETE (CUPID ONLY): ICD-10-PCS | Mod: 26,,, | Performed by: INTERNAL MEDICINE

## 2019-05-15 PROCEDURE — 93306 TTE W/DOPPLER COMPLETE: CPT | Mod: 26,,, | Performed by: INTERNAL MEDICINE

## 2019-05-15 PROCEDURE — 93306 TTE W/DOPPLER COMPLETE: CPT

## 2019-05-15 RX ORDER — KETOROLAC TROMETHAMINE 10 MG/1
10 TABLET, FILM COATED ORAL EVERY 6 HOURS PRN
Qty: 12 TABLET | Refills: 0 | Status: SHIPPED | OUTPATIENT
Start: 2019-05-15 | End: 2020-06-05

## 2019-05-15 RX ORDER — HYDROCODONE BITARTRATE AND ACETAMINOPHEN 5; 325 MG/1; MG/1
1 TABLET ORAL
Status: COMPLETED | OUTPATIENT
Start: 2019-05-15 | End: 2019-05-15

## 2019-05-15 RX ADMIN — HYDROCODONE BITARTRATE AND ACETAMINOPHEN 1 TABLET: 5; 325 TABLET ORAL at 12:05

## 2019-05-15 RX ADMIN — SULFUR HEXAFLUORIDE 2.5 ML: KIT at 03:05

## 2019-05-15 RX ADMIN — LIDOCAINE HYDROCHLORIDE: 20 SOLUTION ORAL; TOPICAL at 01:05

## 2019-05-15 NOTE — ED TRIAGE NOTES
Pt c/o mid sternal chest pain, described as burning and stinging, since this am. No meds taken at home fore relief. Denies n/v, diaphoresis, sob

## 2019-05-15 NOTE — NURSING
Echo complete. Iv removed with #20 jelco intact pressure applied with dressing. Pt tolerated well.

## 2019-05-17 LAB
CV ECHO LV RWT: 0.37 CM
DOP CALC LVOT AREA: 4.15 CM2
DOP CALC LVOT DIAMETER: 2.3 CM
E WAVE DECELERATION TIME: 158 MSEC
E/A RATIO: 1.17
ECHO LV POSTERIOR WALL: 1.1 CM (ref 0.6–1.1)
FRACTIONAL SHORTENING: 35 % (ref 28–44)
INTERVENTRICULAR SEPTUM: 1.2 CM (ref 0.6–1.1)
LEFT ATRIUM SIZE: 4.8 CM
LEFT INTERNAL DIMENSION IN SYSTOLE: 3.9 CM (ref 2.1–4)
LEFT VENTRICULAR INTERNAL DIMENSION IN DIASTOLE: 6 CM (ref 3.5–6)
LEFT VENTRICULAR MASS: 296.61 G
MV PEAK A VEL: 0.81 M/S
MV PEAK E VEL: 0.95 M/S
PISA TR MAX VEL: 1.67 M/S
RIGHT VENTRICULAR END-DIASTOLIC DIMENSION: 3.3 CM
TR MAX PG: 11.16 MMHG

## 2019-05-17 NOTE — ED PROVIDER NOTES
Encounter Date: 5/14/2019       History     Chief Complaint   Patient presents with    Chest Pain     Patient present to the ER with upper abdominal and mid chest pain for the past couple days.  Patient denies any radiation of pain, describes pain as burning in nature.  Denies any shortness of breath or nausea.  No cardiovascular history.        Review of patient's allergies indicates:   Allergen Reactions    Azithromycin     Erythromycin Hives     History reviewed. No pertinent past medical history.  Past Surgical History:   Procedure Laterality Date    APPENDECTOMY      LEG SURGERY Right     NECK SURGERY       History reviewed. No pertinent family history.  Social History     Tobacco Use    Smoking status: Current Some Day Smoker     Types: Cigarettes    Smokeless tobacco: Never Used   Substance Use Topics    Alcohol use: Yes     Comment: Occ.    Drug use: Not on file     Review of Systems   Constitutional: Negative.    HENT: Negative.    Eyes: Negative.    Respiratory: Negative.  Negative for shortness of breath.    Cardiovascular: Positive for chest pain. Negative for palpitations.   Gastrointestinal: Negative.  Negative for nausea and vomiting.   Endocrine: Negative.    Genitourinary: Negative.    Musculoskeletal: Negative.    Neurological: Negative.    Hematological: Negative.    Psychiatric/Behavioral: Negative.        Physical Exam     Initial Vitals [05/14/19 2316]   BP Pulse Resp Temp SpO2   113/72 68 20 98.5 °F (36.9 °C) (!) 94 %      MAP       --         Physical Exam    Nursing note and vitals reviewed.  Constitutional: He appears well-developed and well-nourished. He is not diaphoretic. No distress.   HENT:   Head: Normocephalic and atraumatic.   Eyes: Pupils are equal, round, and reactive to light.   Neck: Normal range of motion. Neck supple.   Cardiovascular: Normal rate, regular rhythm, normal heart sounds and intact distal pulses. Exam reveals no gallop and no friction rub.    No murmur  heard.  Pulmonary/Chest: Breath sounds normal. No respiratory distress. He has no wheezes. He has no rhonchi. He has no rales. He exhibits no tenderness.   Abdominal: Soft. Bowel sounds are normal. He exhibits no distension. There is tenderness. There is no rebound and no guarding.   Mildly tender over epigastric area   Musculoskeletal: Normal range of motion. He exhibits no edema.   Neurological: He is alert and oriented to person, place, and time. He has normal strength.   Skin: Skin is warm and dry. Capillary refill takes less than 2 seconds.   Psychiatric: He has a normal mood and affect. His behavior is normal. Judgment and thought content normal.         ED Course   Procedures  Labs Reviewed   TROPONIN I - Abnormal; Notable for the following components:       Result Value    Troponin I <0.01 (*)     All other components within normal limits        ECG Results          EKG 12-lead (Final result)  Result time 05/16/19 11:10:30    Final result by Interface, Lab In [facility] (05/16/19 11:10:30)                 Narrative:    Test Reason : CHEST  PAIN    Vent. Rate : 072 BPM     Atrial Rate : 072 BPM     P-R Int : 144 ms          QRS Dur : 086 ms      QT Int : 370 ms       P-R-T Axes : 036 020 038 degrees     QTc Int : 405 ms    Normal sinus rhythm  Normal ECG  No previous ECGs available  Confirmed by Ramin Silverio MD ([id number]) on 5/16/2019 11:10:15 AM    Referred By: [email or web address]   SELF           Confirmed By:Ramin Silverio MD                            Imaging Results    None          Medical Decision Making:   ED Management:  Patient's pain completely relieved after medication given in the ED.  Discussion had with patient and family member about risk factors and his need for follow-up with primary care provider or cardiologist in the next week.                      Clinical Impression:       ICD-10-CM ICD-9-CM   1. Chest pain R07.9 786.50                                Nikki Aparicio MD  05/17/19 3796

## 2019-05-22 ENCOUNTER — OFFICE VISIT (OUTPATIENT)
Dept: FAMILY MEDICINE | Facility: CLINIC | Age: 34
End: 2019-05-22
Payer: MEDICAID

## 2019-05-22 VITALS
RESPIRATION RATE: 16 BRPM | HEIGHT: 68 IN | SYSTOLIC BLOOD PRESSURE: 132 MMHG | WEIGHT: 308.81 LBS | DIASTOLIC BLOOD PRESSURE: 72 MMHG | HEART RATE: 72 BPM | BODY MASS INDEX: 46.8 KG/M2 | OXYGEN SATURATION: 96 %

## 2019-05-22 DIAGNOSIS — R15.9 FULL INCONTINENCE OF FECES: Primary | ICD-10-CM

## 2019-05-22 DIAGNOSIS — N52.1 ERECTILE DYSFUNCTION DUE TO DISEASES CLASSIFIED ELSEWHERE: ICD-10-CM

## 2019-05-22 DIAGNOSIS — H60.90 OTITIS EXTERNA, UNSPECIFIED CHRONICITY, UNSPECIFIED LATERALITY, UNSPECIFIED TYPE: ICD-10-CM

## 2019-05-22 PROCEDURE — 99999 PR PBB SHADOW E&M-EST. PATIENT-LVL IV: CPT | Mod: PBBFAC,,, | Performed by: FAMILY MEDICINE

## 2019-05-22 PROCEDURE — 99214 OFFICE O/P EST MOD 30 MIN: CPT | Mod: PBBFAC,PN | Performed by: FAMILY MEDICINE

## 2019-05-22 PROCEDURE — 99214 PR OFFICE/OUTPT VISIT, EST, LEVL IV, 30-39 MIN: ICD-10-PCS | Mod: S$PBB,,, | Performed by: FAMILY MEDICINE

## 2019-05-22 PROCEDURE — 99999 PR PBB SHADOW E&M-EST. PATIENT-LVL IV: ICD-10-PCS | Mod: PBBFAC,,, | Performed by: FAMILY MEDICINE

## 2019-05-22 PROCEDURE — 99214 OFFICE O/P EST MOD 30 MIN: CPT | Mod: S$PBB,,, | Performed by: FAMILY MEDICINE

## 2019-05-22 RX ORDER — SILDENAFIL 50 MG/1
50 TABLET, FILM COATED ORAL DAILY PRN
Qty: 5 TABLET | Refills: 2 | Status: SHIPPED | OUTPATIENT
Start: 2019-05-22 | End: 2020-05-21

## 2019-05-22 RX ORDER — MONTELUKAST SODIUM 10 MG/1
10 TABLET ORAL NIGHTLY
Qty: 30 TABLET | Refills: 11 | Status: SHIPPED | OUTPATIENT
Start: 2019-05-22 | End: 2019-06-21
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
## 2019-06-10 ENCOUNTER — PROCEDURE VISIT (OUTPATIENT)
Dept: SLEEP MEDICINE | Facility: HOSPITAL | Age: 34
End: 2019-06-10
Attending: INTERNAL MEDICINE
Payer: MEDICAID

## 2019-06-10 DIAGNOSIS — G47.19 EXCESSIVE DAYTIME SLEEPINESS: ICD-10-CM

## 2019-06-10 DIAGNOSIS — E66.01 MORBID OBESITY: ICD-10-CM

## 2019-06-10 PROCEDURE — 95810 POLYSOM 6/> YRS 4/> PARAM: CPT

## 2019-06-12 DIAGNOSIS — M62.838 MUSCLE SPASMS OF BOTH LOWER EXTREMITIES: ICD-10-CM

## 2019-06-12 RX ORDER — BACLOFEN 10 MG/1
10 TABLET ORAL 3 TIMES DAILY
Qty: 90 TABLET | Refills: 11 | Status: SHIPPED | OUTPATIENT
Start: 2019-06-12 | End: 2020-06-11 | Stop reason: SDUPTHER

## 2019-06-19 ENCOUNTER — PROCEDURE VISIT (OUTPATIENT)
Dept: SLEEP MEDICINE | Facility: HOSPITAL | Age: 34
End: 2019-06-19
Attending: INTERNAL MEDICINE
Payer: MEDICAID

## 2019-06-19 DIAGNOSIS — G47.33 OSA (OBSTRUCTIVE SLEEP APNEA): ICD-10-CM

## 2019-06-19 DIAGNOSIS — G47.33 OSA (OBSTRUCTIVE SLEEP APNEA): Primary | ICD-10-CM

## 2019-06-19 PROCEDURE — 95811 POLYSOM 6/>YRS CPAP 4/> PARM: CPT

## 2019-06-26 ENCOUNTER — INITIAL CONSULT (OUTPATIENT)
Dept: GASTROENTEROLOGY | Facility: CLINIC | Age: 34
End: 2019-06-26
Payer: MEDICAID

## 2019-06-26 VITALS
WEIGHT: 310.88 LBS | SYSTOLIC BLOOD PRESSURE: 118 MMHG | RESPIRATION RATE: 18 BRPM | TEMPERATURE: 99 F | HEART RATE: 81 BPM | DIASTOLIC BLOOD PRESSURE: 68 MMHG | BODY MASS INDEX: 47.11 KG/M2 | OXYGEN SATURATION: 96 % | HEIGHT: 68 IN

## 2019-06-26 DIAGNOSIS — K62.89 RECTAL PAIN: Primary | ICD-10-CM

## 2019-06-26 DIAGNOSIS — E66.01 OBESITY, CLASS III, BMI 40-49.9 (MORBID OBESITY): ICD-10-CM

## 2019-06-26 DIAGNOSIS — K62.89 RECTAL PAIN: ICD-10-CM

## 2019-06-26 DIAGNOSIS — N31.9 BLADDER DYSFUNCTION: Primary | ICD-10-CM

## 2019-06-26 DIAGNOSIS — K59.00 CONSTIPATION, UNSPECIFIED CONSTIPATION TYPE: ICD-10-CM

## 2019-06-26 PROCEDURE — 99999 PR PBB SHADOW E&M-EST. PATIENT-LVL III: ICD-10-PCS | Mod: PBBFAC,,, | Performed by: INTERNAL MEDICINE

## 2019-06-26 PROCEDURE — 99203 OFFICE O/P NEW LOW 30 MIN: CPT | Mod: S$PBB,,, | Performed by: INTERNAL MEDICINE

## 2019-06-26 PROCEDURE — 99203 PR OFFICE/OUTPT VISIT, NEW, LEVL III, 30-44 MIN: ICD-10-PCS | Mod: S$PBB,,, | Performed by: INTERNAL MEDICINE

## 2019-06-26 PROCEDURE — 99213 OFFICE O/P EST LOW 20 MIN: CPT | Mod: PBBFAC,PN | Performed by: INTERNAL MEDICINE

## 2019-06-26 PROCEDURE — 99999 PR PBB SHADOW E&M-EST. PATIENT-LVL III: CPT | Mod: PBBFAC,,, | Performed by: INTERNAL MEDICINE

## 2019-06-26 NOTE — PATIENT INSTRUCTIONS
He will be referred to the urologist for his bladder dysfunction.  He is scheduled for colonoscopy because of increasing constipation and rectal pain with episodes of incontinence.  He will continue his other medications.  He is waiting for his CPAP machine.  He understands the procedures of colonoscopy.
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
## 2019-06-26 NOTE — LETTER
June 26, 2019      Cb Warren MD  [address]  [place] [address]           [facility]  [place] - [facility]  [address]  [place] [address]  Phone: [phone]  Fax: [phone]          Patient: Meek Hanson   MR Number: [id number]   YOB: 1985   Date of Visit: 6/26/2019       Dear Dr. Cb Warren:    Thank you for referring Meek Hanson to me for evaluation. Attached you will find relevant portions of my assessment and plan of care.    If you have questions, please do not hesitate to call me. I look forward to following Meek Hanson along with you.    Sincerely,    Onel Warren MD    Enclosure  CC:  No Recipients    If you would like to receive this communication electronically, please contact [email or web address] or [phone] to request more information on [employer] Link access.    For providers and/or their staff who would like to refer a patient to [facility], please contact us through our one-stop-shop provider referral line, [facility], at [phone].    If you feel you have received this communication in error or would no longer like to receive these types of communications, please e-mail [email or web address]
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
## 2019-07-23 ENCOUNTER — ANESTHESIA EVENT (OUTPATIENT)
Dept: SURGERY | Facility: HOSPITAL | Age: 34
End: 2019-07-23
Payer: MEDICAID

## 2019-07-23 ENCOUNTER — ANESTHESIA (OUTPATIENT)
Dept: SURGERY | Facility: HOSPITAL | Age: 34
End: 2019-07-23
Payer: MEDICAID

## 2019-07-23 ENCOUNTER — TELEPHONE (OUTPATIENT)
Dept: FAMILY MEDICINE | Facility: CLINIC | Age: 34
End: 2019-07-23

## 2019-07-23 ENCOUNTER — HOSPITAL ENCOUNTER (OUTPATIENT)
Facility: HOSPITAL | Age: 34
Discharge: HOME | End: 2019-07-23
Attending: INTERNAL MEDICINE | Admitting: INTERNAL MEDICINE
Payer: MEDICAID

## 2019-07-23 DIAGNOSIS — K62.89 RECTAL PAIN: ICD-10-CM

## 2019-07-23 PROCEDURE — 00811 ANES LWR INTST NDSC NOS: CPT | Performed by: INTERNAL MEDICINE

## 2019-07-23 PROCEDURE — D9220A PRA ANESTHESIA: Mod: ANES,,, | Performed by: ANESTHESIOLOGY

## 2019-07-23 PROCEDURE — 88305 TISSUE SPECIMEN TO PATHOLOGY - SURGERY: ICD-10-PCS | Mod: 26,,, | Performed by: PATHOLOGY

## 2019-07-23 PROCEDURE — 37000009 HC ANESTHESIA EA ADD 15 MINS: Performed by: INTERNAL MEDICINE

## 2019-07-23 PROCEDURE — 45378 PR COLONOSCOPY,DIAGNOSTIC: ICD-10-PCS | Mod: ,,, | Performed by: INTERNAL MEDICINE

## 2019-07-23 PROCEDURE — D9220A PRA ANESTHESIA: ICD-10-PCS | Mod: ANES,,, | Performed by: ANESTHESIOLOGY

## 2019-07-23 PROCEDURE — D9220A PRA ANESTHESIA: Mod: CRNA,,, | Performed by: NURSE ANESTHETIST, CERTIFIED REGISTERED

## 2019-07-23 PROCEDURE — 45380 COLONOSCOPY AND BIOPSY: CPT | Performed by: INTERNAL MEDICINE

## 2019-07-23 PROCEDURE — 63600175 PHARM REV CODE 636 W HCPCS: Performed by: NURSE ANESTHETIST, CERTIFIED REGISTERED

## 2019-07-23 PROCEDURE — D9220A PRA ANESTHESIA: ICD-10-PCS | Mod: CRNA,,, | Performed by: NURSE ANESTHETIST, CERTIFIED REGISTERED

## 2019-07-23 PROCEDURE — 37000008 HC ANESTHESIA 1ST 15 MINUTES: Performed by: INTERNAL MEDICINE

## 2019-07-23 PROCEDURE — 25000003 PHARM REV CODE 250: Performed by: ANESTHESIOLOGY

## 2019-07-23 PROCEDURE — 88305 TISSUE EXAM BY PATHOLOGIST: CPT | Mod: 26,,, | Performed by: PATHOLOGY

## 2019-07-23 PROCEDURE — 45378 DIAGNOSTIC COLONOSCOPY: CPT | Mod: ,,, | Performed by: INTERNAL MEDICINE

## 2019-07-23 PROCEDURE — 88305 TISSUE EXAM BY PATHOLOGIST: CPT | Performed by: PATHOLOGY

## 2019-07-23 PROCEDURE — 45378 DIAGNOSTIC COLONOSCOPY: CPT | Performed by: INTERNAL MEDICINE

## 2019-07-23 RX ORDER — LIDOCAINE HYDROCHLORIDE 10 MG/ML
1 INJECTION, SOLUTION EPIDURAL; INFILTRATION; INTRACAUDAL; PERINEURAL ONCE
Status: DISCONTINUED | OUTPATIENT
Start: 2019-07-23 | End: 2019-07-23 | Stop reason: SDUPTHER

## 2019-07-23 RX ORDER — LIDOCAINE HYDROCHLORIDE 10 MG/ML
1 INJECTION, SOLUTION EPIDURAL; INFILTRATION; INTRACAUDAL; PERINEURAL ONCE
Status: DISCONTINUED | OUTPATIENT
Start: 2019-07-23 | End: 2019-07-23 | Stop reason: HOSPADM

## 2019-07-23 RX ORDER — ONDANSETRON 2 MG/ML
4 INJECTION INTRAMUSCULAR; INTRAVENOUS DAILY PRN
Status: DISCONTINUED | OUTPATIENT
Start: 2019-07-23 | End: 2019-07-23 | Stop reason: HOSPADM

## 2019-07-23 RX ORDER — KETOROLAC TROMETHAMINE 30 MG/ML
15 INJECTION, SOLUTION INTRAMUSCULAR; INTRAVENOUS ONCE
Status: DISCONTINUED | OUTPATIENT
Start: 2019-07-23 | End: 2019-07-23 | Stop reason: SDUPTHER

## 2019-07-23 RX ORDER — MORPHINE SULFATE 4 MG/ML
2 INJECTION, SOLUTION INTRAMUSCULAR; INTRAVENOUS EVERY 5 MIN PRN
Status: DISCONTINUED | OUTPATIENT
Start: 2019-07-23 | End: 2019-07-23 | Stop reason: SDUPTHER

## 2019-07-23 RX ORDER — SODIUM CHLORIDE, SODIUM LACTATE, POTASSIUM CHLORIDE, CALCIUM CHLORIDE 600; 310; 30; 20 MG/100ML; MG/100ML; MG/100ML; MG/100ML
125 INJECTION, SOLUTION INTRAVENOUS CONTINUOUS
Status: DISCONTINUED | OUTPATIENT
Start: 2019-07-23 | End: 2019-07-23 | Stop reason: SDUPTHER

## 2019-07-23 RX ORDER — SODIUM CHLORIDE, SODIUM LACTATE, POTASSIUM CHLORIDE, CALCIUM CHLORIDE 600; 310; 30; 20 MG/100ML; MG/100ML; MG/100ML; MG/100ML
INJECTION, SOLUTION INTRAVENOUS CONTINUOUS
Status: DISCONTINUED | OUTPATIENT
Start: 2019-07-23 | End: 2019-07-23 | Stop reason: HOSPADM

## 2019-07-23 RX ORDER — OXYCODONE AND ACETAMINOPHEN 5; 325 MG/1; MG/1
1 TABLET ORAL
Status: DISCONTINUED | OUTPATIENT
Start: 2019-07-23 | End: 2019-07-23 | Stop reason: SDUPTHER

## 2019-07-23 RX ORDER — MEPERIDINE HYDROCHLORIDE 50 MG/ML
INJECTION INTRAMUSCULAR; INTRAVENOUS; SUBCUTANEOUS
Status: DISCONTINUED | OUTPATIENT
Start: 2019-07-23 | End: 2019-07-23

## 2019-07-23 RX ORDER — ONDANSETRON 2 MG/ML
4 INJECTION INTRAMUSCULAR; INTRAVENOUS DAILY PRN
Status: DISCONTINUED | OUTPATIENT
Start: 2019-07-23 | End: 2019-07-23 | Stop reason: SDUPTHER

## 2019-07-23 RX ORDER — MORPHINE SULFATE 4 MG/ML
2 INJECTION, SOLUTION INTRAMUSCULAR; INTRAVENOUS EVERY 5 MIN PRN
Status: DISCONTINUED | OUTPATIENT
Start: 2019-07-23 | End: 2019-07-23 | Stop reason: HOSPADM

## 2019-07-23 RX ORDER — SODIUM CHLORIDE, SODIUM LACTATE, POTASSIUM CHLORIDE, CALCIUM CHLORIDE 600; 310; 30; 20 MG/100ML; MG/100ML; MG/100ML; MG/100ML
125 INJECTION, SOLUTION INTRAVENOUS CONTINUOUS
Status: DISCONTINUED | OUTPATIENT
Start: 2019-07-23 | End: 2019-07-23 | Stop reason: HOSPADM

## 2019-07-23 RX ORDER — PROPOFOL 10 MG/ML
VIAL (ML) INTRAVENOUS
Status: DISCONTINUED | OUTPATIENT
Start: 2019-07-23 | End: 2019-07-23

## 2019-07-23 RX ORDER — KETOROLAC TROMETHAMINE 30 MG/ML
15 INJECTION, SOLUTION INTRAMUSCULAR; INTRAVENOUS ONCE
Status: DISCONTINUED | OUTPATIENT
Start: 2019-07-23 | End: 2019-07-23 | Stop reason: HOSPADM

## 2019-07-23 RX ORDER — OXYCODONE AND ACETAMINOPHEN 5; 325 MG/1; MG/1
1 TABLET ORAL
Status: DISCONTINUED | OUTPATIENT
Start: 2019-07-23 | End: 2019-07-23 | Stop reason: HOSPADM

## 2019-07-23 RX ORDER — MIDAZOLAM HYDROCHLORIDE 1 MG/ML
INJECTION, SOLUTION INTRAMUSCULAR; INTRAVENOUS
Status: DISCONTINUED | OUTPATIENT
Start: 2019-07-23 | End: 2019-07-23

## 2019-07-23 RX ADMIN — SODIUM CHLORIDE, POTASSIUM CHLORIDE, SODIUM LACTATE AND CALCIUM CHLORIDE: 600; 310; 30; 20 INJECTION, SOLUTION INTRAVENOUS at 09:07

## 2019-07-23 RX ADMIN — PROPOFOL 20 MG: 10 INJECTION, EMULSION INTRAVENOUS at 08:07

## 2019-07-23 RX ADMIN — SODIUM CHLORIDE, POTASSIUM CHLORIDE, SODIUM LACTATE AND CALCIUM CHLORIDE: 600; 310; 30; 20 INJECTION, SOLUTION INTRAVENOUS at 08:07

## 2019-07-23 RX ADMIN — MEPERIDINE HYDROCHLORIDE 25 MG: 50 INJECTION INTRAMUSCULAR; INTRAVENOUS; SUBCUTANEOUS at 08:07

## 2019-07-23 RX ADMIN — MIDAZOLAM HYDROCHLORIDE 2 MG: 1 INJECTION, SOLUTION INTRAMUSCULAR; INTRAVENOUS at 08:07

## 2019-07-23 NOTE — TRANSFER OF CARE
"Anesthesia Transfer of Care Note    Patient: Meek Hanson    Procedure(s) Performed: Procedure(s) (LRB):  COLONOSCOPY (N/A)    Patient location: PACU    Anesthesia Type: general    Transport from OR: Transported from OR on room air with adequate spontaneous ventilation    Post pain: adequate analgesia    Post assessment: no apparent anesthetic complications and tolerated procedure well    Post vital signs: stable    Level of consciousness: awake, alert and oriented    Nausea/Vomiting: no nausea/vomiting    Complications: none    Transfer of care protocol was followed      Last vitals:   Visit Vitals  /73   Pulse 71   Temp 36.6 °C (97.9 °F) (Oral)   Resp 18   Ht 5' 8" (1.727 m)   Wt (!) 137.4 kg (303 lb)   SpO2 96%   BMI 46.07 kg/m²     "

## 2019-07-23 NOTE — TELEPHONE ENCOUNTER
----- Message from Biju Burks sent at 7/23/2019 11:39 AM CDT -----  Contact: pt's mother  Type: Needs Medical Advice    Who Called:  Pt's mother  Best Call Back Number: [phone]  Additional Information: Patient needs to schedule 31 day sleep study f/iu with Dr Warren has a separate appt already scheduled on 8-23-, but needs additional appt as well please contact to schedule

## 2019-07-23 NOTE — OP NOTE
Procedure. Colonoscopy with biopsies.  Indications rectal pain with constipation left lower quadrant discomfort and occasional hematochezia. He has good health knowledge.  He understands the procedures of colonoscopy.  Specifically he realizes there is an extremely small incidence of bleeding perforation or aspiration.  Anesthesia.  Monitored anesthesia.  Procedure. With the patient in left lateral supine position and in the procedure room the perianal area was examined and there were perianal hyperemia and palpable hemorrhoids.  The Olympus colonoscope was then passed to the cecum identified by the ileocecal valve and the triradiate fold.  Mild washing was required.  There was very careful circumferential inspection of the mucosa as the scope was withdrawn. In the mid sigmoid was a hyperemic fold and biopsies were obtained for histology.  The scope was retroflexed in the rectum and hemorrhoids were identified.  As the scope was withdrawn there was anal canal hyperemia bleeding was not seen.  Patient tolerated the procedure well.  Impression 1.  Internal hemorrhoids 2.  Segmental hyperemia of the sigmoid colon consistent with nonspecific colitis

## 2019-07-23 NOTE — ANESTHESIA POSTPROCEDURE EVALUATION
Anesthesia Post Evaluation    Patient: Meek Hanson    Procedure(s) Performed: Procedure(s) (LRB):  COLONOSCOPY (N/A)    Final Anesthesia Type: general  Patient location during evaluation: PACU  Patient participation: Yes- Able to Participate  Level of consciousness: awake and awake and alert  Post-procedure vital signs: reviewed and stable  Pain management: adequate  Airway patency: patent  PONV status at discharge: No PONV  Anesthetic complications: no      Cardiovascular status: blood pressure returned to baseline  Respiratory status: unassisted and spontaneous ventilation  Hydration status: euvolemic  Follow-up not needed.          Vitals Value Taken Time   BP 97/82 7/23/2019  9:32 AM   Temp 36.6 °C (97.9 °F) 7/23/2019  7:53 AM   Pulse 63 7/23/2019  9:31 AM   Resp 8 7/23/2019  9:31 AM   SpO2 96 % 7/23/2019  9:31 AM   Vitals shown include unvalidated device data.      Event Time     Out of Recovery 09:55:00          Pain/Elizabeth Score: Elizabeth Score: 10 (7/23/2019  9:30 AM)

## 2019-07-23 NOTE — DISCHARGE SUMMARY
He will continue his current medications diet and activities.  He will start on the MiraLax and/or stool softeners.  He will follow up in the office in 2 weeks at which time I will review his evaluation and make further recommendations.  He continues his vitamins minerals and usual activities.

## 2019-07-23 NOTE — H&P
He has experienced or rectal pain.  He has noted increasing constipation left lower quadrant discomfort.  He has had occasional hematochezia.  He is here for colonoscopy.  He has good health with nursing he understands the procedure. Specifically he realizes there is an extremely small incidence of bleeding perforation or aspiration.  Physical examination.  Well-nourished well-hydrated nonicteric male.  HEENT is normocephalic.  Jaundice is absent.  The lungs reveal symmetrical respirations at rales rhonchi.  Heart reveals regular rhythm.  The abdomen is soft without organomegaly masses felt.  Bowel sounds are normal.  There is mild tenderness in left lower quadrant.  Neurologic review is he is oriented x3 impression 1.  Rectal pain with increasing constipation and left lower quadrant discomfort and occasional hematochezia.

## 2019-07-23 NOTE — [employer] PATIENT INSTRUCTIONS
Discharge Summary/Instructions after an Endoscopic Procedure  Patient Name: Meek Hanson  Patient MRN: [id number]  Patient YOB: 1985 Tuesday, July 23, 2019  Onel Warren MD  RESTRICTIONS:  During your procedure today, you received medications for sedation.  These   medications may affect your judgment, balance and coordination.  Therefore,   for 24 hours, you have the following restrictions:   - DO NOT drive a car, operate machinery, make legal/financial decisions,   sign important papers or drink alcohol.    ACTIVITY:  Today: no heavy lifting, straining or running due to procedural   sedation/anesthesia.  The following day: return to full activity including work.  DIET:  Eat and drink normally unless instructed otherwise.     TREATMENT FOR COMMON SIDE EFFECTS:  - Mild abdominal pain, nausea, belching, bloating or excessive gas:  rest,   eat lightly and use a heating pad.  - Sore Throat: treat with throat lozenges and/or gargle with warm salt   water.  - Because air was used during the procedure, expelling large amounts of air   from your rectum or belching is normal.  - If a bowel prep was taken, you may not have a bowel movement for 1-3 days.    This is normal.  SYMPTOMS TO WATCH FOR AND REPORT TO YOUR PHYSICIAN:  1. Abdominal pain or bloating, other than gas cramps.  2. Chest pain.  3. Back pain.  4. Signs of infection such as: chills or fever occurring within 24 hours   after the procedure.  5. Rectal bleeding, which would show as bright red, maroon, or black stools.   (A tablespoon of blood from the rectum is not serious, especially if   hemorrhoids are present.)  6. Vomiting.  7. Weakness or dizziness.  GO DIRECTLY TO THE NEAREST EMERGENCY ROOM IF YOU HAVE ANY OF THE FOLLOWING:      Difficulty breathing              Chills and/or fever over 101 F   Persistent vomiting and/or vomiting blood   Severe abdominal pain   Severe chest pain   Black, tarry stools   Bleeding- more than one tablespoon   Any  other symptom or condition that you feel may need urgent attention  Your doctor recommends these additional instructions:  If any biopsies were taken, your doctors clinic will contact you in 1 to 2   weeks with any results.  - Discharge patient to home (ambulatory).   - Resume previous diet.  For questions, problems or results please call your physician - Onel Warren MD at Work:  [phone].  [facility] EMERGENCY ROOM PHONE NUMBER: [phone]  IF A COMPLICATION OR EMERGENCY SITUATION ARISES AND YOU ARE UNABLE TO REACH   YOUR PHYSICIAN - GO DIRECTLY TO THE EMERGENCY ROOM.  MD Onel Watters MD  7/23/2019 8:57:54 AM  This report has been verified and signed electronically.  [employer]
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
## 2019-07-26 ENCOUNTER — OFFICE VISIT (OUTPATIENT)
Dept: UROLOGY | Facility: CLINIC | Age: 34
End: 2019-07-26
Payer: MEDICAID

## 2019-07-26 VITALS
BODY MASS INDEX: 46.07 KG/M2 | SYSTOLIC BLOOD PRESSURE: 131 MMHG | TEMPERATURE: 98 F | RESPIRATION RATE: 16 BRPM | DIASTOLIC BLOOD PRESSURE: 77 MMHG | HEIGHT: 68 IN | WEIGHT: 304 LBS | HEART RATE: 81 BPM

## 2019-07-26 DIAGNOSIS — N31.9 BLADDER DYSFUNCTION: Primary | ICD-10-CM

## 2019-07-26 PROCEDURE — 99204 OFFICE O/P NEW MOD 45 MIN: CPT | Mod: S$PBB,,, | Performed by: UROLOGY

## 2019-07-26 PROCEDURE — 99999 PR PBB SHADOW E&M-EST. PATIENT-LVL III: ICD-10-PCS | Mod: PBBFAC,,, | Performed by: UROLOGY

## 2019-07-26 PROCEDURE — 99999 PR PBB SHADOW E&M-EST. PATIENT-LVL III: CPT | Mod: PBBFAC,,, | Performed by: UROLOGY

## 2019-07-26 PROCEDURE — 99204 PR OFFICE/OUTPT VISIT, NEW, LEVL IV, 45-59 MIN: ICD-10-PCS | Mod: S$PBB,,, | Performed by: UROLOGY

## 2019-07-26 PROCEDURE — 99213 OFFICE O/P EST LOW 20 MIN: CPT | Mod: PBBFAC | Performed by: UROLOGY

## 2019-07-26 PROCEDURE — 81002 URINALYSIS NONAUTO W/O SCOPE: CPT | Mod: PBBFAC | Performed by: UROLOGY

## 2019-07-26 NOTE — LETTER
July 26, 2019      Onel Warren MD  [address] [place] [address]           [facility] - [facility]rology  [address] [place] [address]  Phone: [phone]  Fax: [phone]          Patient: Meek Hanson   MR Number: [id number]   YOB: 1985   Date of Visit: 7/26/2019       Dear Dr. Onel Warren:    Thank you for referring Meek Hanson to me for evaluation. Attached you will find relevant portions of my assessment and plan of care.    If you have questions, please do not hesitate to call me. I look forward to following Meek Hanson along with you.    Sincerely,    Ford Andino MD    Enclosure  CC:  No Recipients    If you would like to receive this communication electronically, please contact [email or web address] or [phone] to request more information on [employer] Link access.    For providers and/or their staff who would like to refer a patient to [facility], please contact us through our one-stop-shop provider referral line, [facility], at [phone].    If you feel you have received this communication in error or would no longer like to receive these types of communications, please e-mail [email or web address]
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
## 2019-07-29 ENCOUNTER — OFFICE VISIT (OUTPATIENT)
Dept: GASTROENTEROLOGY | Facility: CLINIC | Age: 34
End: 2019-07-29
Payer: MEDICAID

## 2019-07-29 VITALS
HEART RATE: 69 BPM | DIASTOLIC BLOOD PRESSURE: 70 MMHG | HEIGHT: 68 IN | RESPIRATION RATE: 19 BRPM | WEIGHT: 308 LBS | BODY MASS INDEX: 46.68 KG/M2 | OXYGEN SATURATION: 98 % | SYSTOLIC BLOOD PRESSURE: 121 MMHG | TEMPERATURE: 97 F

## 2019-07-29 VITALS
DIASTOLIC BLOOD PRESSURE: 82 MMHG | HEIGHT: 68 IN | TEMPERATURE: 98 F | HEART RATE: 61 BPM | SYSTOLIC BLOOD PRESSURE: 97 MMHG | BODY MASS INDEX: 45.92 KG/M2 | WEIGHT: 303 LBS | RESPIRATION RATE: 12 BRPM | OXYGEN SATURATION: 96 %

## 2019-07-29 DIAGNOSIS — K59.00 CONSTIPATION, UNSPECIFIED CONSTIPATION TYPE: ICD-10-CM

## 2019-07-29 DIAGNOSIS — K64.9 HEMORRHOIDS, UNSPECIFIED HEMORRHOID TYPE: Primary | ICD-10-CM

## 2019-07-29 PROCEDURE — 99213 PR OFFICE/OUTPT VISIT, EST, LEVL III, 20-29 MIN: ICD-10-PCS | Mod: S$PBB,,, | Performed by: INTERNAL MEDICINE

## 2019-07-29 PROCEDURE — 99213 OFFICE O/P EST LOW 20 MIN: CPT | Mod: PBBFAC,PN | Performed by: INTERNAL MEDICINE

## 2019-07-29 PROCEDURE — 99213 OFFICE O/P EST LOW 20 MIN: CPT | Mod: S$PBB,,, | Performed by: INTERNAL MEDICINE

## 2019-07-29 PROCEDURE — 99999 PR PBB SHADOW E&M-EST. PATIENT-LVL III: CPT | Mod: PBBFAC,,, | Performed by: INTERNAL MEDICINE

## 2019-07-29 PROCEDURE — 99999 PR PBB SHADOW E&M-EST. PATIENT-LVL III: ICD-10-PCS | Mod: PBBFAC,,, | Performed by: INTERNAL MEDICINE

## 2019-07-29 RX ORDER — LUBIPROSTONE 24 UG/1
24 CAPSULE ORAL 2 TIMES DAILY
Qty: 180 CAPSULE | Refills: 1 | Status: SHIPPED | OUTPATIENT
Start: 2019-07-29 | End: 2020-06-05
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
## 2019-07-29 NOTE — PATIENT INSTRUCTIONS
He will add more fiber to the diet.  He can take the fiber pills.  In addition he started on Amitiza he.  He follows up with year his urologist.  He continues his vitamins minerals and current medications.  Colonoscopy revealed hemorrhoids.  He denies bleeding.  And he will try to avoid constipation.

## 2019-07-30 LAB
BILIRUB SERPL-MCNC: NEGATIVE MG/DL
BLOOD URINE, POC: NEGATIVE
COLOR, POC UA: NORMAL
GLUCOSE UR QL STRIP: NEGATIVE
KETONES UR QL STRIP: NEGATIVE
LEUKOCYTE ESTERASE URINE, POC: NEGATIVE
NITRITE, POC UA: NEGATIVE
PH, POC UA: 5
PROTEIN, POC: NEGATIVE
SPECIFIC GRAVITY, POC UA: 1010
UROBILINOGEN, POC UA: NEGATIVE

## 2019-08-07 ENCOUNTER — PATIENT OUTREACH (OUTPATIENT)
Dept: ADMINISTRATIVE | Facility: HOSPITAL | Age: 34
End: 2019-08-07

## 2019-08-22 ENCOUNTER — PATIENT OUTREACH (OUTPATIENT)
Dept: ADMINISTRATIVE | Facility: HOSPITAL | Age: 34
End: 2019-08-22

## 2019-08-23 ENCOUNTER — OFFICE VISIT (OUTPATIENT)
Dept: FAMILY MEDICINE | Facility: CLINIC | Age: 34
End: 2019-08-23
Payer: MEDICAID

## 2019-08-23 VITALS
WEIGHT: 304.5 LBS | HEART RATE: 74 BPM | HEIGHT: 68 IN | DIASTOLIC BLOOD PRESSURE: 73 MMHG | RESPIRATION RATE: 20 BRPM | BODY MASS INDEX: 46.15 KG/M2 | OXYGEN SATURATION: 97 % | SYSTOLIC BLOOD PRESSURE: 115 MMHG

## 2019-08-23 DIAGNOSIS — H60.313 CHRONIC DIFFUSE OTITIS EXTERNA OF BOTH EARS: Primary | ICD-10-CM

## 2019-08-23 PROCEDURE — 99999 PR PBB SHADOW E&M-EST. PATIENT-LVL III: CPT | Mod: PBBFAC,,, | Performed by: FAMILY MEDICINE

## 2019-08-23 PROCEDURE — 99214 OFFICE O/P EST MOD 30 MIN: CPT | Mod: S$PBB,,, | Performed by: FAMILY MEDICINE

## 2019-08-23 PROCEDURE — 99214 PR OFFICE/OUTPT VISIT, EST, LEVL IV, 30-39 MIN: ICD-10-PCS | Mod: S$PBB,,, | Performed by: FAMILY MEDICINE

## 2019-08-23 PROCEDURE — 99999 PR PBB SHADOW E&M-EST. PATIENT-LVL III: ICD-10-PCS | Mod: PBBFAC,,, | Performed by: FAMILY MEDICINE

## 2019-08-23 PROCEDURE — 99213 OFFICE O/P EST LOW 20 MIN: CPT | Mod: PBBFAC,PN | Performed by: FAMILY MEDICINE

## 2019-08-23 RX ORDER — MONTELUKAST SODIUM 10 MG/1
TABLET ORAL
Refills: 11 | COMMUNITY
Start: 2019-08-08 | End: 2020-06-11 | Stop reason: SDUPTHER
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
## 2019-09-03 ENCOUNTER — TELEPHONE (OUTPATIENT)
Dept: UROLOGY | Facility: CLINIC | Age: 34
End: 2019-09-03

## 2019-09-03 ENCOUNTER — TELEPHONE (OUTPATIENT)
Dept: FAMILY MEDICINE | Facility: CLINIC | Age: 34
End: 2019-09-03

## 2019-09-03 NOTE — TELEPHONE ENCOUNTER
Spoke with pt let him know that his insurance would not be accepted in [place] since he has [place] medicaid and that his only other option would be to have urodynamics study at [facility] in [place].  Pt stated it would be hard for him to travel that far and he will call us back after he decides what he can do.

## 2019-09-03 NOTE — TELEPHONE ENCOUNTER
----- Message from Lisa Cruz sent at 9/3/2019  9:19 AM CDT -----  Contact: [phone]  Patient is requesting a call back from the nurse stated the pharmacy waiting on prior authorization, been waiting for over a week.  See fax.    Please call the patient upon request at phone number [phone].    Patient will be using   [id number] [place] [address] [address] [facility] [address]  [address] [place] [address] [place] [address]  Phone: [phone] Fax: [phone]    Thanks!

## 2019-10-18 ENCOUNTER — TELEPHONE (OUTPATIENT)
Dept: FAMILY MEDICINE | Facility: CLINIC | Age: 34
End: 2019-10-18

## 2019-10-18 RX ORDER — AMOXICILLIN 875 MG/1
875 TABLET, FILM COATED ORAL EVERY 12 HOURS
Qty: 20 TABLET | Refills: 0 | Status: SHIPPED | OUTPATIENT
Start: 2019-10-18 | End: 2020-02-28

## 2019-10-18 NOTE — TELEPHONE ENCOUNTER
Pt requesting abx sent to the pharmacy for tooth pain.  Please advise, thank you.         ----- Message from Jose Real sent at 10/18/2019  3:42 PM CDT -----  Contact: Mother Elayne   Mother is requesting a antibiotic sent in for tooth pain, please call back at [phone] (home)          [id number] [place] [address] [address] [address]  Phone: [phone] Fax: [phone]

## 2019-11-14 ENCOUNTER — PATIENT OUTREACH (OUTPATIENT)
Dept: ADMINISTRATIVE | Facility: HOSPITAL | Age: 34
End: 2019-11-14

## 2019-11-29 ENCOUNTER — OFFICE VISIT (OUTPATIENT)
Dept: FAMILY MEDICINE | Facility: CLINIC | Age: 34
End: 2019-11-29
Payer: MEDICAID

## 2019-11-29 ENCOUNTER — LAB VISIT (OUTPATIENT)
Dept: LAB | Facility: HOSPITAL | Age: 34
End: 2019-11-29
Attending: FAMILY MEDICINE
Payer: MEDICAID

## 2019-11-29 VITALS
HEART RATE: 72 BPM | RESPIRATION RATE: 20 BRPM | SYSTOLIC BLOOD PRESSURE: 123 MMHG | BODY MASS INDEX: 46.4 KG/M2 | OXYGEN SATURATION: 98 % | WEIGHT: 306.13 LBS | HEIGHT: 68 IN | DIASTOLIC BLOOD PRESSURE: 74 MMHG

## 2019-11-29 DIAGNOSIS — K21.9 GASTROESOPHAGEAL REFLUX DISEASE WITHOUT ESOPHAGITIS: ICD-10-CM

## 2019-11-29 DIAGNOSIS — G47.33 OSA (OBSTRUCTIVE SLEEP APNEA): ICD-10-CM

## 2019-11-29 DIAGNOSIS — I10 ESSENTIAL HYPERTENSION: Primary | ICD-10-CM

## 2019-11-29 DIAGNOSIS — I10 ESSENTIAL HYPERTENSION: ICD-10-CM

## 2019-11-29 LAB
ALBUMIN SERPL BCP-MCNC: 4 G/DL (ref 3.5–5.2)
ALP SERPL-CCNC: 73 U/L (ref 55–135)
ALT SERPL W/O P-5'-P-CCNC: 51 U/L (ref 10–44)
ANION GAP SERPL CALC-SCNC: 10 MMOL/L (ref 8–16)
AST SERPL-CCNC: 42 U/L (ref 10–40)
BASOPHILS # BLD AUTO: 0.09 K/UL (ref 0–0.2)
BASOPHILS NFR BLD: 1 % (ref 0–1.9)
BILIRUB SERPL-MCNC: 0.3 MG/DL (ref 0.1–1)
BUN SERPL-MCNC: 11 MG/DL (ref 6–20)
CALCIUM SERPL-MCNC: 8.7 MG/DL (ref 8.7–10.5)
CHLORIDE SERPL-SCNC: 105 MMOL/L (ref 95–110)
CHOLEST SERPL-MCNC: 153 MG/DL (ref 120–199)
CHOLEST/HDLC SERPL: 4.9 {RATIO} (ref 2–5)
CO2 SERPL-SCNC: 22 MMOL/L (ref 23–29)
CREAT SERPL-MCNC: 0.9 MG/DL (ref 0.5–1.4)
DIFFERENTIAL METHOD: ABNORMAL
EOSINOPHIL # BLD AUTO: 0.3 K/UL (ref 0–0.5)
EOSINOPHIL NFR BLD: 3.5 % (ref 0–8)
ERYTHROCYTE [DISTWIDTH] IN BLOOD BY AUTOMATED COUNT: 12.1 % (ref 11.5–14.5)
EST. GFR  (AFRICAN AMERICAN): >60 ML/MIN/1.73 M^2
EST. GFR  (NON AFRICAN AMERICAN): >60 ML/MIN/1.73 M^2
GLUCOSE SERPL-MCNC: 99 MG/DL (ref 70–110)
HCT VFR BLD AUTO: 43.5 % (ref 40–54)
HDLC SERPL-MCNC: 31 MG/DL (ref 40–75)
HDLC SERPL: 20.3 % (ref 20–50)
HGB BLD-MCNC: 14.6 G/DL (ref 14–18)
IMM GRANULOCYTES # BLD AUTO: 0.08 K/UL (ref 0–0.04)
IMM GRANULOCYTES NFR BLD AUTO: 0.9 % (ref 0–0.5)
LDLC SERPL CALC-MCNC: 101.6 MG/DL (ref 63–159)
LYMPHOCYTES # BLD AUTO: 2.1 K/UL (ref 1–4.8)
LYMPHOCYTES NFR BLD: 23.7 % (ref 18–48)
MCH RBC QN AUTO: 29.7 PG (ref 27–31)
MCHC RBC AUTO-ENTMCNC: 33.6 G/DL (ref 32–36)
MCV RBC AUTO: 89 FL (ref 82–98)
MONOCYTES # BLD AUTO: 0.6 K/UL (ref 0.3–1)
MONOCYTES NFR BLD: 6.7 % (ref 4–15)
NEUTROPHILS # BLD AUTO: 5.7 K/UL (ref 1.8–7.7)
NEUTROPHILS NFR BLD: 64.2 % (ref 38–73)
NONHDLC SERPL-MCNC: 122 MG/DL
NRBC BLD-RTO: 0 /100 WBC
PLATELET # BLD AUTO: 252 K/UL (ref 150–350)
PMV BLD AUTO: 9.4 FL (ref 9.2–12.9)
POTASSIUM SERPL-SCNC: 3.9 MMOL/L (ref 3.5–5.1)
PROT SERPL-MCNC: 8.2 G/DL (ref 6–8.4)
RBC # BLD AUTO: 4.91 M/UL (ref 4.6–6.2)
SODIUM SERPL-SCNC: 137 MMOL/L (ref 136–145)
T4 FREE SERPL-MCNC: 1.04 NG/DL (ref 0.71–1.51)
TRIGL SERPL-MCNC: 102 MG/DL (ref 30–150)
TSH SERPL DL<=0.005 MIU/L-ACNC: 2.94 UIU/ML (ref 0.34–5.6)
WBC # BLD AUTO: 8.86 K/UL (ref 3.9–12.7)

## 2019-11-29 PROCEDURE — 85025 COMPLETE CBC W/AUTO DIFF WBC: CPT

## 2019-11-29 PROCEDURE — 99214 PR OFFICE/OUTPT VISIT, EST, LEVL IV, 30-39 MIN: ICD-10-PCS | Mod: S$PBB,,, | Performed by: FAMILY MEDICINE

## 2019-11-29 PROCEDURE — 99999 PR PBB SHADOW E&M-EST. PATIENT-LVL III: CPT | Mod: PBBFAC,,, | Performed by: FAMILY MEDICINE

## 2019-11-29 PROCEDURE — 36415 COLL VENOUS BLD VENIPUNCTURE: CPT

## 2019-11-29 PROCEDURE — 80053 COMPREHEN METABOLIC PANEL: CPT

## 2019-11-29 PROCEDURE — 99999 PR PBB SHADOW E&M-EST. PATIENT-LVL III: ICD-10-PCS | Mod: PBBFAC,,, | Performed by: FAMILY MEDICINE

## 2019-11-29 PROCEDURE — 84443 ASSAY THYROID STIM HORMONE: CPT

## 2019-11-29 PROCEDURE — 99213 OFFICE O/P EST LOW 20 MIN: CPT | Mod: PBBFAC,PN | Performed by: FAMILY MEDICINE

## 2019-11-29 PROCEDURE — 99214 OFFICE O/P EST MOD 30 MIN: CPT | Mod: S$PBB,,, | Performed by: FAMILY MEDICINE

## 2019-11-29 PROCEDURE — 84439 ASSAY OF FREE THYROXINE: CPT

## 2019-11-29 PROCEDURE — 80061 LIPID PANEL: CPT

## 2019-11-29 RX ORDER — PANTOPRAZOLE SODIUM 40 MG/1
40 TABLET, DELAYED RELEASE ORAL DAILY
Qty: 30 TABLET | Refills: 11 | Status: SHIPPED | OUTPATIENT
Start: 2019-11-29 | End: 2020-11-28

## 2019-11-29 NOTE — PROGRESS NOTES
"Subjective:       Patient ID: Meek Hanson is a 34 y.o. male.    Chief Complaint: Follow-up (would like BW orders) and Dizziness    Sleep apnea  Well controlled  Using cpap with relief  Compliant    Also with gerd  Worsening  Associated with fatigue  Would like some blood work      In regards to the patient's hypertension, patient denies chest pain/sob, and reports compliance with medication regimen.    Review of Systems   Constitutional: Negative for activity change, appetite change, fatigue and fever.   HENT: Negative for congestion, dental problem, ear discharge, hearing loss, postnasal drip, sinus pain, sneezing and trouble swallowing.    Eyes: Negative for photophobia and discharge.   Respiratory: Negative for apnea, cough and shortness of breath.    Cardiovascular: Negative for chest pain.   Gastrointestinal: Negative for abdominal distention, abdominal pain, blood in stool, diarrhea, nausea and vomiting.   Endocrine: Negative for cold intolerance.   Genitourinary: Negative for difficulty urinating, flank pain, frequency, hematuria and testicular pain.   Musculoskeletal: Positive for back pain and neck stiffness. Negative for arthralgias and myalgias.   Skin: Positive for rash. Negative for color change.   Allergic/Immunologic: Negative for environmental allergies, food allergies and immunocompromised state.   Neurological: Negative for dizziness, syncope, numbness and headaches.   Hematological: Negative for adenopathy. Does not bruise/bleed easily.   Psychiatric/Behavioral: Negative for agitation, confusion, decreased concentration, hallucinations, self-injury and sleep disturbance.   All other systems reviewed and are negative.        Reviewed family, medical, surgical, and social history.    Objective:      /74 (BP Location: Left arm, Patient Position: Sitting, BP Method: X-Large (Automatic))   Pulse 72   Resp 20   Ht 5' 8" (1.727 m)   Wt (!) 138.8 kg (306 lb 1.6 oz)   SpO2 98%   BMI 46.54 " kg/m²   Physical Exam   Constitutional: He is oriented to person, place, and time. No distress.   HENT:   Head: Normocephalic and atraumatic.   Nose: Nose normal.   Mouth/Throat: Oropharynx is clear and moist. No oropharyngeal exudate.   Eyes: Pupils are equal, round, and reactive to light. Conjunctivae and EOM are normal.   Neck: No thyromegaly present.   Cardiovascular: Normal rate, regular rhythm, normal heart sounds and intact distal pulses. Exam reveals no gallop and no friction rub.   No murmur heard.  Pulmonary/Chest: Effort normal and breath sounds normal. No respiratory distress. He has no wheezes. He has no rales.   Abdominal: Soft. He exhibits no distension. There is no tenderness. There is no guarding.   Musculoskeletal: He exhibits tenderness and deformity. He exhibits no edema.   Neurological: He is alert and oriented to person, place, and time. He displays abnormal reflex. A sensory deficit is present. No cranial nerve deficit. He exhibits abnormal muscle tone. Coordination abnormal.   Skin: Skin is warm and dry. No rash noted. He is not diaphoretic. No erythema. No pallor.   Psychiatric: He has a normal mood and affect. His behavior is normal. Judgment and thought content normal.   Nursing note and vitals reviewed.      Assessment:       1. Essential hypertension    2. Gastroesophageal reflux disease without esophagitis    3. TERESA (obstructive sleep apnea)        Plan:       Essential hypertension  -     Comprehensive metabolic panel; Future; Expected date: 11/29/2019  -     CBC auto differential; Future; Expected date: 11/29/2019  -     Lipid panel; Future; Expected date: 11/29/2019  -     TSH; Future; Expected date: 11/29/2019  -     T4, free; Future; Expected date: 11/29/2019  -     pantoprazole (PROTONIX) 40 MG tablet; Take 1 tablet (40 mg total) by mouth once daily.  Dispense: 30 tablet; Refill: 11    Gastroesophageal reflux disease without esophagitis  -     Comprehensive metabolic panel;  Future; Expected date: 11/29/2019  -     CBC auto differential; Future; Expected date: 11/29/2019  -     Lipid panel; Future; Expected date: 11/29/2019  -     TSH; Future; Expected date: 11/29/2019  -     T4, free; Future; Expected date: 11/29/2019  -     pantoprazole (PROTONIX) 40 MG tablet; Take 1 tablet (40 mg total) by mouth once daily.  Dispense: 30 tablet; Refill: 11    TERESA (obstructive sleep apnea)            Risks, benefits, and side effects were discussed with the patient. All questions were answered to the fullest satisfaction of the patient, and pt verbalized understanding and agreement to treatment plan. Pt was to call with any new or worsening symptoms, or present to the ER.

## 2020-02-17 ENCOUNTER — PATIENT OUTREACH (OUTPATIENT)
Dept: ADMINISTRATIVE | Facility: HOSPITAL | Age: 35
End: 2020-02-17

## 2020-02-28 ENCOUNTER — DOCUMENTATION ONLY (OUTPATIENT)
Dept: FAMILY MEDICINE | Facility: CLINIC | Age: 35
End: 2020-02-28

## 2020-02-28 ENCOUNTER — OFFICE VISIT (OUTPATIENT)
Dept: FAMILY MEDICINE | Facility: CLINIC | Age: 35
End: 2020-02-28
Payer: MEDICAID

## 2020-02-28 VITALS
DIASTOLIC BLOOD PRESSURE: 67 MMHG | BODY MASS INDEX: 46.2 KG/M2 | HEIGHT: 68 IN | WEIGHT: 304.81 LBS | SYSTOLIC BLOOD PRESSURE: 109 MMHG | HEART RATE: 67 BPM | RESPIRATION RATE: 16 BRPM | OXYGEN SATURATION: 94 %

## 2020-02-28 DIAGNOSIS — L98.9 SKIN LESION: Primary | ICD-10-CM

## 2020-02-28 DIAGNOSIS — G47.33 OSA (OBSTRUCTIVE SLEEP APNEA): ICD-10-CM

## 2020-02-28 PROCEDURE — 99214 OFFICE O/P EST MOD 30 MIN: CPT | Mod: S$PBB,,, | Performed by: FAMILY MEDICINE

## 2020-02-28 PROCEDURE — 99999 PR PBB SHADOW E&M-EST. PATIENT-LVL III: ICD-10-PCS | Mod: PBBFAC,,, | Performed by: FAMILY MEDICINE

## 2020-02-28 PROCEDURE — 99214 PR OFFICE/OUTPT VISIT, EST, LEVL IV, 30-39 MIN: ICD-10-PCS | Mod: S$PBB,,, | Performed by: FAMILY MEDICINE

## 2020-02-28 PROCEDURE — 99213 OFFICE O/P EST LOW 20 MIN: CPT | Mod: PBBFAC,PN | Performed by: FAMILY MEDICINE

## 2020-02-28 PROCEDURE — 99999 PR PBB SHADOW E&M-EST. PATIENT-LVL III: CPT | Mod: PBBFAC,,, | Performed by: FAMILY MEDICINE

## 2020-02-28 RX ORDER — CLOTRIMAZOLE AND BETAMETHASONE DIPROPIONATE 10; .64 MG/G; MG/G
CREAM TOPICAL 2 TIMES DAILY
Qty: 45 G | Refills: 2 | OUTPATIENT
Start: 2020-02-28 | End: 2023-07-02

## 2020-02-28 NOTE — PROGRESS NOTES
"Faxed office note for CPAP supplies to [facility].  Confirmation received.  Your fax has been successfully sent to [phone] at [phone].  ------------------------------------------------------------  From: [id number]  ------------------------------------------------------------  2/28/2020 2:18:48 PM Transmission Record          Sent to [id number] with remote ID "[id number]"          Result: (0/339;0/0) Success          Page record: 1 - 6          Elapsed time: 03:32 on channel 65  "

## 2020-02-28 NOTE — PROGRESS NOTES
"Subjective:       Patient ID: Meek Hanson is a 34 y.o. male.    Chief Complaint: Follow-up and Rash (L leg)    Using cpap  No issues  Compliant with therapy  Patient states that he is getting a significant benefit from therapy    Skin lesion on leg  Worsening  Last few weeks  Pruritic  No fever  No weight loss    Review of Systems   Constitutional: Negative for activity change, appetite change, fatigue and fever.   HENT: Negative for congestion, dental problem, ear discharge, hearing loss, postnasal drip, sinus pain, sneezing and trouble swallowing.    Eyes: Negative for photophobia and discharge.   Respiratory: Negative for apnea, cough and shortness of breath.    Cardiovascular: Negative for chest pain.   Gastrointestinal: Negative for abdominal distention, abdominal pain, blood in stool, diarrhea, nausea and vomiting.   Endocrine: Negative for cold intolerance.   Genitourinary: Negative for difficulty urinating, flank pain, frequency, hematuria and testicular pain.   Musculoskeletal: Positive for back pain and neck stiffness. Negative for arthralgias and myalgias.   Skin: Positive for rash. Negative for color change.   Allergic/Immunologic: Negative for environmental allergies, food allergies and immunocompromised state.   Neurological: Negative for dizziness, syncope, numbness and headaches.   Hematological: Negative for adenopathy. Does not bruise/bleed easily.   Psychiatric/Behavioral: Negative for agitation, confusion, decreased concentration, hallucinations, self-injury and sleep disturbance.   All other systems reviewed and are negative.        Reviewed family, medical, surgical, and social history.    Objective:      /67 (BP Location: Left arm, Patient Position: Sitting, BP Method: Medium (Automatic))   Pulse 67   Resp 16   Ht 5' 8" (1.727 m)   Wt (!) 138.3 kg (304 lb 12.8 oz)   SpO2 (!) 94%   BMI 46.34 kg/m²   Physical Exam   Constitutional: He is oriented to person, place, and time. No " distress.   HENT:   Head: Normocephalic and atraumatic.   Nose: Nose normal.   Mouth/Throat: Oropharynx is clear and moist. No oropharyngeal exudate.   Eyes: Pupils are equal, round, and reactive to light. Conjunctivae and EOM are normal.   Neck: No thyromegaly present.   Cardiovascular: Normal rate, regular rhythm, normal heart sounds and intact distal pulses. Exam reveals no gallop and no friction rub.   No murmur heard.  Pulmonary/Chest: Effort normal and breath sounds normal. No respiratory distress. He has no wheezes. He has no rales.   Abdominal: Soft. He exhibits no distension. There is no tenderness. There is no guarding.   Musculoskeletal: He exhibits tenderness and deformity. He exhibits no edema.   Neurological: He is alert and oriented to person, place, and time. He displays abnormal reflex. A sensory deficit is present. No cranial nerve deficit. He exhibits abnormal muscle tone. Coordination abnormal.   Skin: Skin is warm and dry. No rash noted. He is not diaphoretic. No erythema. No pallor.   Psychiatric: He has a normal mood and affect. His behavior is normal. Judgment and thought content normal.   Nursing note and vitals reviewed.      Assessment:       1. Skin lesion    2. TERESA (obstructive sleep apnea)        Plan:       Skin lesion  -     clotrimazole-betamethasone 1-0.05% (LOTRISONE) cream; Apply topically 2 (two) times daily.  Dispense: 45 g; Refill: 2    TERESA (obstructive sleep apnea)    Continue cpap        Risks, benefits, and side effects were discussed with the patient. All questions were answered to the fullest satisfaction of the patient, and pt verbalized understanding and agreement to treatment plan. Pt was to call with any new or worsening symptoms, or present to the ER.

## 2020-06-05 ENCOUNTER — OFFICE VISIT (OUTPATIENT)
Dept: FAMILY MEDICINE | Facility: CLINIC | Age: 35
End: 2020-06-05
Payer: MEDICAID

## 2020-06-05 VITALS
SYSTOLIC BLOOD PRESSURE: 123 MMHG | RESPIRATION RATE: 20 BRPM | TEMPERATURE: 99 F | OXYGEN SATURATION: 94 % | WEIGHT: 313.81 LBS | HEART RATE: 85 BPM | DIASTOLIC BLOOD PRESSURE: 70 MMHG | BODY MASS INDEX: 47.56 KG/M2 | HEIGHT: 68 IN

## 2020-06-05 DIAGNOSIS — K59.09 OTHER CONSTIPATION: Primary | ICD-10-CM

## 2020-06-05 DIAGNOSIS — M25.511 ACUTE PAIN OF RIGHT SHOULDER: ICD-10-CM

## 2020-06-05 DIAGNOSIS — R73.9 HYPERGLYCEMIA: ICD-10-CM

## 2020-06-05 PROCEDURE — 99999 PR PBB SHADOW E&M-EST. PATIENT-LVL IV: CPT | Mod: PBBFAC,,, | Performed by: FAMILY MEDICINE

## 2020-06-05 PROCEDURE — 99213 PR OFFICE/OUTPT VISIT, EST, LEVL III, 20-29 MIN: ICD-10-PCS | Mod: S$PBB,,, | Performed by: FAMILY MEDICINE

## 2020-06-05 PROCEDURE — 99999 PR PBB SHADOW E&M-EST. PATIENT-LVL IV: ICD-10-PCS | Mod: PBBFAC,,, | Performed by: FAMILY MEDICINE

## 2020-06-05 PROCEDURE — 99213 OFFICE O/P EST LOW 20 MIN: CPT | Mod: S$PBB,,, | Performed by: FAMILY MEDICINE

## 2020-06-05 PROCEDURE — 99214 OFFICE O/P EST MOD 30 MIN: CPT | Mod: PBBFAC,PN | Performed by: FAMILY MEDICINE

## 2020-06-05 NOTE — PROGRESS NOTES
"Subjective:       Patient ID: Meek Hanson is a 35 y.o. male.    Chief Complaint: Follow-up    Pt states that constipation is well controlled  No issues/side effects  Compliant with treatment regimen    Review of Systems   Constitutional: Negative for activity change, appetite change, fatigue and fever.   HENT: Negative for congestion, dental problem, ear discharge, hearing loss, postnasal drip, sinus pain, sneezing and trouble swallowing.    Eyes: Negative for photophobia and discharge.   Respiratory: Negative for apnea, cough and shortness of breath.    Cardiovascular: Negative for chest pain.   Gastrointestinal: Negative for abdominal distention, abdominal pain, blood in stool, diarrhea, nausea and vomiting.   Endocrine: Negative for cold intolerance.   Genitourinary: Negative for difficulty urinating, flank pain, frequency, hematuria and testicular pain.   Musculoskeletal: Negative for arthralgias and myalgias.   Skin: Negative for color change.   Allergic/Immunologic: Negative for environmental allergies, food allergies and immunocompromised state.   Neurological: Negative for dizziness, syncope, numbness and headaches.   Hematological: Negative for adenopathy. Does not bruise/bleed easily.   Psychiatric/Behavioral: Negative for agitation, confusion, decreased concentration, hallucinations, self-injury and sleep disturbance.   All other systems reviewed and are negative.        Reviewed family, medical, surgical, and social history.    Objective:      /70 (BP Location: Left arm, Patient Position: Sitting, BP Method: Large (Automatic))   Pulse 85   Temp 98.5 °F (36.9 °C) (Oral)   Resp 20   Ht 5' 8" (1.727 m)   Wt (!) 142.3 kg (313 lb 12.8 oz)   SpO2 (!) 94%   BMI 47.71 kg/m²   Physical Exam   Constitutional: He is oriented to person, place, and time. He appears well-developed and well-nourished. No distress.   HENT:   Head: Normocephalic and atraumatic.   Nose: Nose normal.   Mouth/Throat: " Oropharynx is clear and moist. No oropharyngeal exudate.   Eyes: Pupils are equal, round, and reactive to light. Conjunctivae and EOM are normal.   Neck: Normal range of motion. No thyromegaly present.   Cardiovascular: Normal rate, regular rhythm, normal heart sounds and intact distal pulses. Exam reveals no gallop and no friction rub.   No murmur heard.  Pulmonary/Chest: Effort normal and breath sounds normal. No respiratory distress. He has no wheezes. He has no rales.   Abdominal: Soft. He exhibits no distension. There is no tenderness. There is no guarding.   Musculoskeletal: Normal range of motion. He exhibits no edema, tenderness or deformity.   Neurological: He is alert and oriented to person, place, and time. He displays normal reflexes. No cranial nerve deficit or sensory deficit. He exhibits normal muscle tone. Coordination normal.   Skin: Skin is warm and dry. No rash noted. He is not diaphoretic. No erythema. No pallor.   Psychiatric: He has a normal mood and affect. His behavior is normal. Judgment and thought content normal.   Nursing note and vitals reviewed.      Assessment:       1. Other constipation    2. Hyperglycemia    3. Acute pain of right shoulder        Plan:       Other constipation  -     CBC auto differential; Future; Expected date: 06/05/2020  -     Comprehensive metabolic panel; Future; Expected date: 06/05/2020  -     Microalbumin/creatinine urine ratio; Future  -     Lipid Panel; Future; Expected date: 06/05/2020  -     TSH; Future; Expected date: 06/05/2020  -     T4, free; Future; Expected date: 06/05/2020  -     Hemoglobin A1C; Future; Expected date: 06/05/2020    Hyperglycemia  -     CBC auto differential; Future; Expected date: 06/05/2020  -     Comprehensive metabolic panel; Future; Expected date: 06/05/2020  -     Microalbumin/creatinine urine ratio; Future  -     Lipid Panel; Future; Expected date: 06/05/2020  -     TSH; Future; Expected date: 06/05/2020  -     T4, free;  Future; Expected date: 06/05/2020  -     Hemoglobin A1C; Future; Expected date: 06/05/2020    Acute pain of right shoulder  -     X-ray Shoulder 2 or More Views Right; Future; Expected date: 06/05/2020            Risks, benefits, and side effects were discussed with the patient. All questions were answered to the fullest satisfaction of the patient, and pt verbalized understanding and agreement to treatment plan. Pt was to call with any new or worsening symptoms, or present to the ER.

## 2020-06-11 DIAGNOSIS — M62.838 MUSCLE SPASMS OF BOTH LOWER EXTREMITIES: ICD-10-CM

## 2020-06-11 NOTE — TELEPHONE ENCOUNTER
----- Message from Zahida Tejada sent at 6/11/2020  4:37 PM CDT -----  Contact: Elayne Coreas (Mother)  Elayne Coreas (Mother)calling states pt needing a refill on his baclofen (LIORESAL) 10 MG tablet and montelukast (SINGULAIR) 10 mg tablet. Only have 1 night of both....[phone] (home)         .  [id number] - [place] [id number] AT [facility] OF [facility] [address] & [facility] [address]  [address] [place] [id number]  Phone: [phone] Fax: [phone]

## 2020-06-12 RX ORDER — MONTELUKAST SODIUM 10 MG/1
TABLET ORAL
Qty: 30 TABLET | Refills: 11 | Status: SHIPPED | OUTPATIENT
Start: 2020-06-12 | End: 2021-03-13

## 2020-06-12 RX ORDER — BACLOFEN 10 MG/1
10 TABLET ORAL 3 TIMES DAILY
Qty: 90 TABLET | Refills: 11 | Status: SHIPPED | OUTPATIENT
Start: 2020-06-12 | End: 2021-03-13

## 2020-06-15 ENCOUNTER — HOSPITAL ENCOUNTER (OUTPATIENT)
Dept: RADIOLOGY | Facility: HOSPITAL | Age: 35
Discharge: HOME | End: 2020-06-15
Attending: FAMILY MEDICINE
Payer: MEDICAID

## 2020-06-15 DIAGNOSIS — M25.511 ACUTE PAIN OF RIGHT SHOULDER: ICD-10-CM

## 2020-06-15 PROCEDURE — 73030 X-RAY EXAM OF SHOULDER: CPT | Mod: 26,RT,, | Performed by: RADIOLOGY

## 2020-06-15 PROCEDURE — 73030 X-RAY EXAM OF SHOULDER: CPT | Mod: TC,PN,RT

## 2020-06-15 PROCEDURE — 73030 XR SHOULDER COMPLETE 2 OR MORE VIEWS RIGHT: ICD-10-PCS | Mod: 26,RT,, | Performed by: RADIOLOGY

## 2020-06-15 NOTE — PROGRESS NOTES
Please let Meek know Dr Warren is out but I reviewed his xray and it is normal. F/U as ordered  Thanks

## 2020-09-11 ENCOUNTER — OFFICE VISIT (OUTPATIENT)
Dept: FAMILY MEDICINE | Facility: CLINIC | Age: 35
End: 2020-09-11
Payer: MEDICARE

## 2020-09-11 VITALS
DIASTOLIC BLOOD PRESSURE: 72 MMHG | OXYGEN SATURATION: 97 % | TEMPERATURE: 98 F | HEIGHT: 68 IN | RESPIRATION RATE: 20 BRPM | BODY MASS INDEX: 47.74 KG/M2 | WEIGHT: 315 LBS | HEART RATE: 74 BPM | SYSTOLIC BLOOD PRESSURE: 128 MMHG

## 2020-09-11 DIAGNOSIS — I10 ESSENTIAL HYPERTENSION: Primary | ICD-10-CM

## 2020-09-11 PROCEDURE — 99213 OFFICE O/P EST LOW 20 MIN: CPT | Mod: PBBFAC,PN | Performed by: FAMILY MEDICINE

## 2020-09-11 PROCEDURE — 99213 PR OFFICE/OUTPT VISIT, EST, LEVL III, 20-29 MIN: ICD-10-PCS | Mod: S$PBB,,, | Performed by: FAMILY MEDICINE

## 2020-09-11 PROCEDURE — 99999 PR PBB SHADOW E&M-EST. PATIENT-LVL III: CPT | Mod: PBBFAC,,, | Performed by: FAMILY MEDICINE

## 2020-09-11 PROCEDURE — 99213 OFFICE O/P EST LOW 20 MIN: CPT | Mod: S$PBB,,, | Performed by: FAMILY MEDICINE

## 2020-09-11 PROCEDURE — 99999 PR PBB SHADOW E&M-EST. PATIENT-LVL III: ICD-10-PCS | Mod: PBBFAC,,, | Performed by: FAMILY MEDICINE

## 2020-09-11 NOTE — PROGRESS NOTES
"Subjective:       Patient ID: Meek Hanson is a 35 y.o. male.    Chief Complaint: Follow-up    In regards to the patient's hypertension, patient denies chest pain/sob, and reports compliance with medication regimen.    Review of Systems   Constitutional: Negative for activity change, appetite change, fatigue and fever.   HENT: Negative for congestion, dental problem, ear discharge, hearing loss, postnasal drip, sinus pain, sneezing and trouble swallowing.    Eyes: Negative for photophobia and discharge.   Respiratory: Negative for apnea, cough and shortness of breath.    Cardiovascular: Negative for chest pain.   Gastrointestinal: Negative for abdominal distention, abdominal pain, blood in stool, diarrhea, nausea and vomiting.   Endocrine: Negative for cold intolerance.   Genitourinary: Negative for difficulty urinating, flank pain, frequency, hematuria and testicular pain.   Musculoskeletal: Positive for arthralgias, back pain, gait problem and myalgias.   Skin: Negative for color change.   Allergic/Immunologic: Negative for environmental allergies, food allergies and immunocompromised state.   Neurological: Negative for dizziness, syncope, numbness and headaches.   Hematological: Negative for adenopathy. Does not bruise/bleed easily.   Psychiatric/Behavioral: Negative for agitation, confusion, decreased concentration, hallucinations, self-injury and sleep disturbance.   All other systems reviewed and are negative.        Reviewed family, medical, surgical, and social history.    Objective:      /72 (BP Location: Left arm, Patient Position: Sitting, BP Method: Large (Automatic))   Pulse 74   Temp 98.1 °F (36.7 °C) (Oral)   Resp 20   Ht 5' 8" (1.727 m)   Wt (!) 146.1 kg (322 lb 3.2 oz)   SpO2 97%   BMI 48.99 kg/m²   Physical Exam  Vitals signs and nursing note reviewed.   Constitutional:       General: He is not in acute distress.     Appearance: He is well-developed. He is not diaphoretic.   HENT:    "   Head: Normocephalic and atraumatic.      Nose: Nose normal.      Mouth/Throat:      Pharynx: No oropharyngeal exudate.   Eyes:      Conjunctiva/sclera: Conjunctivae normal.      Pupils: Pupils are equal, round, and reactive to light.   Neck:      Musculoskeletal: Normal range of motion.      Thyroid: No thyromegaly.   Cardiovascular:      Rate and Rhythm: Normal rate and regular rhythm.      Heart sounds: Normal heart sounds. No murmur. No friction rub. No gallop.    Pulmonary:      Effort: Pulmonary effort is normal. No respiratory distress.      Breath sounds: Normal breath sounds. No wheezing or rales.   Abdominal:      General: There is no distension.      Palpations: Abdomen is soft.      Tenderness: There is no abdominal tenderness. There is no guarding.   Musculoskeletal: Normal range of motion.         General: Tenderness and deformity present.   Skin:     General: Skin is warm and dry.      Coloration: Skin is not pale.      Findings: No erythema or rash.   Neurological:      Mental Status: He is alert and oriented to person, place, and time.      Cranial Nerves: Cranial nerve deficit present.      Sensory: Sensory deficit present.      Motor: No abnormal muscle tone.      Coordination: Coordination abnormal.      Deep Tendon Reflexes: Reflexes abnormal.   Psychiatric:         Behavior: Behavior normal.         Thought Content: Thought content normal.         Judgment: Judgment normal.         Assessment:       1. Essential hypertension        Plan:       Essential hypertension            Risks, benefits, and side effects were discussed with the patient. All questions were answered to the fullest satisfaction of the patient, and pt verbalized understanding and agreement to treatment plan. Pt was to call with any new or worsening symptoms, or present to the ER.

## 2020-12-09 ENCOUNTER — OFFICE VISIT (OUTPATIENT)
Dept: FAMILY MEDICINE | Facility: CLINIC | Age: 35
End: 2020-12-09
Payer: MEDICARE

## 2020-12-09 VITALS
DIASTOLIC BLOOD PRESSURE: 81 MMHG | SYSTOLIC BLOOD PRESSURE: 128 MMHG | HEART RATE: 80 BPM | TEMPERATURE: 98 F | HEIGHT: 68 IN | OXYGEN SATURATION: 96 % | BODY MASS INDEX: 47.74 KG/M2 | WEIGHT: 315 LBS | RESPIRATION RATE: 14 BRPM

## 2020-12-09 DIAGNOSIS — I10 ESSENTIAL HYPERTENSION: ICD-10-CM

## 2020-12-09 DIAGNOSIS — N39.0 URINARY TRACT INFECTION WITHOUT HEMATURIA, SITE UNSPECIFIED: ICD-10-CM

## 2020-12-09 DIAGNOSIS — E66.01 MORBIDLY OBESE: ICD-10-CM

## 2020-12-09 DIAGNOSIS — M62.81 MUSCLE WEAKNESS: Primary | ICD-10-CM

## 2020-12-09 LAB
BILIRUB UR QL STRIP: NEGATIVE
CLARITY UR: CLEAR
COLOR UR: YELLOW
GLUCOSE UR QL STRIP: NEGATIVE
HGB UR QL STRIP: ABNORMAL
KETONES UR QL STRIP: NEGATIVE
LEUKOCYTE ESTERASE UR QL STRIP: NEGATIVE
NITRITE UR QL STRIP: NEGATIVE
PH UR STRIP: 6 [PH] (ref 5–8)
PROT UR QL STRIP: NEGATIVE
SP GR UR STRIP: >=1.03 (ref 1–1.03)
URN SPEC COLLECT METH UR: ABNORMAL
UROBILINOGEN UR STRIP-ACNC: NEGATIVE EU/DL

## 2020-12-09 PROCEDURE — 87147 CULTURE TYPE IMMUNOLOGIC: CPT

## 2020-12-09 PROCEDURE — 87088 URINE BACTERIA CULTURE: CPT

## 2020-12-09 PROCEDURE — 99999 PR PBB SHADOW E&M-EST. PATIENT-LVL IV: CPT | Mod: PBBFAC,,, | Performed by: FAMILY MEDICINE

## 2020-12-09 PROCEDURE — 99214 OFFICE O/P EST MOD 30 MIN: CPT | Mod: S$PBB,,, | Performed by: FAMILY MEDICINE

## 2020-12-09 PROCEDURE — 99214 PR OFFICE/OUTPT VISIT, EST, LEVL IV, 30-39 MIN: ICD-10-PCS | Mod: S$PBB,,, | Performed by: FAMILY MEDICINE

## 2020-12-09 PROCEDURE — 87086 URINE CULTURE/COLONY COUNT: CPT

## 2020-12-09 PROCEDURE — 99999 PR PBB SHADOW E&M-EST. PATIENT-LVL IV: ICD-10-PCS | Mod: PBBFAC,,, | Performed by: FAMILY MEDICINE

## 2020-12-09 PROCEDURE — 99214 OFFICE O/P EST MOD 30 MIN: CPT | Mod: PBBFAC,PN | Performed by: FAMILY MEDICINE

## 2020-12-09 PROCEDURE — 81003 URINALYSIS AUTO W/O SCOPE: CPT

## 2020-12-09 NOTE — PROGRESS NOTES
"Subjective:       Patient ID: Meek Hanson is a 35 y.o. male.    Chief Complaint: Follow-up    In regards to the patient's hypertension, patient denies chest pain/sob, and reports compliance with medication regimen.    Review of Systems   Constitutional: Negative for activity change, appetite change, fatigue and fever.   HENT: Negative for congestion, dental problem, ear discharge, hearing loss, postnasal drip, sinus pain, sneezing and trouble swallowing.    Eyes: Negative for photophobia and discharge.   Respiratory: Negative for apnea, cough and shortness of breath.    Cardiovascular: Negative for chest pain.   Gastrointestinal: Negative for abdominal distention, abdominal pain, blood in stool, diarrhea, nausea and vomiting.   Endocrine: Negative for cold intolerance.   Genitourinary: Negative for difficulty urinating, flank pain, frequency, hematuria and testicular pain.   Musculoskeletal: Positive for arthralgias, back pain, gait problem and myalgias.   Skin: Negative for color change.   Allergic/Immunologic: Negative for environmental allergies, food allergies and immunocompromised state.   Neurological: Negative for dizziness, syncope, numbness and headaches.   Hematological: Negative for adenopathy. Does not bruise/bleed easily.   Psychiatric/Behavioral: Negative for agitation, confusion, decreased concentration, hallucinations, self-injury and sleep disturbance.   All other systems reviewed and are negative.        Reviewed family, medical, surgical, and social history.    Objective:      /81   Pulse 80   Temp 98.3 °F (36.8 °C) (Temporal)   Resp 14   Ht 5' 8" (1.727 m)   Wt (!) 149.6 kg (329 lb 11.2 oz)   SpO2 96%   BMI 50.13 kg/m²   Physical Exam  Vitals signs and nursing note reviewed.   Constitutional:       General: He is not in acute distress.     Appearance: He is well-developed. He is not diaphoretic.   HENT:      Head: Normocephalic and atraumatic.      Nose: Nose normal.      " Mouth/Throat:      Pharynx: No oropharyngeal exudate.   Eyes:      Conjunctiva/sclera: Conjunctivae normal.      Pupils: Pupils are equal, round, and reactive to light.   Neck:      Musculoskeletal: Normal range of motion.      Thyroid: No thyromegaly.   Cardiovascular:      Rate and Rhythm: Normal rate and regular rhythm.      Heart sounds: Normal heart sounds. No murmur. No friction rub. No gallop.    Pulmonary:      Effort: Pulmonary effort is normal. No respiratory distress.      Breath sounds: Normal breath sounds. No wheezing or rales.   Abdominal:      General: There is no distension.      Palpations: Abdomen is soft.      Tenderness: There is no abdominal tenderness. There is no guarding.   Musculoskeletal: Normal range of motion.         General: Tenderness and deformity present.   Skin:     General: Skin is warm and dry.      Coloration: Skin is not pale.      Findings: No erythema or rash.   Neurological:      Mental Status: He is alert and oriented to person, place, and time.      Cranial Nerves: Cranial nerve deficit present.      Sensory: Sensory deficit present.      Motor: No abnormal muscle tone.      Coordination: Coordination abnormal.      Deep Tendon Reflexes: Reflexes abnormal.   Psychiatric:         Behavior: Behavior normal.         Thought Content: Thought content normal.         Judgment: Judgment normal.         Assessment:       1. Muscle weakness    2. Essential hypertension    3. Morbidly obese    4. Urinary tract infection without hematuria, site unspecified        Plan:       Muscle weakness    Essential hypertension    Morbidly obese  -     Ambulatory referral/consult to Bariatric Surgery; Future; Expected date: 12/16/2020    Urinary tract infection without hematuria, site unspecified  -     Urine culture  -     Urinalysis            Risks, benefits, and side effects were discussed with the patient. All questions were answered to the fullest satisfaction of the patient, and pt  verbalized understanding and agreement to treatment plan. Pt was to call with any new or worsening symptoms, or present to the ER.

## 2020-12-11 DIAGNOSIS — N30.01 ACUTE CYSTITIS WITH HEMATURIA: Primary | ICD-10-CM

## 2020-12-11 LAB — BACTERIA UR CULT: ABNORMAL

## 2020-12-11 RX ORDER — AMOXICILLIN AND CLAVULANATE POTASSIUM 875; 125 MG/1; MG/1
1 TABLET, FILM COATED ORAL EVERY 12 HOURS
Qty: 20 TABLET | Refills: 0 | Status: SHIPPED | OUTPATIENT
Start: 2020-12-11 | End: 2020-12-21

## 2020-12-11 NOTE — PROGRESS NOTES
Please let Mr. Hanson know he does have a UTI. I will sending in a antibiotic   Increase fluids  Thanks

## 2021-03-10 DIAGNOSIS — Z11.59 NEED FOR HEPATITIS C SCREENING TEST: ICD-10-CM

## 2021-06-11 ENCOUNTER — OFFICE VISIT (OUTPATIENT)
Dept: FAMILY MEDICINE | Facility: CLINIC | Age: 36
End: 2021-06-11
Payer: MEDICARE

## 2021-06-11 VITALS
HEIGHT: 68 IN | RESPIRATION RATE: 16 BRPM | WEIGHT: 315 LBS | BODY MASS INDEX: 47.74 KG/M2 | SYSTOLIC BLOOD PRESSURE: 116 MMHG | OXYGEN SATURATION: 94 % | HEART RATE: 72 BPM | DIASTOLIC BLOOD PRESSURE: 72 MMHG

## 2021-06-11 DIAGNOSIS — I10 ESSENTIAL HYPERTENSION: ICD-10-CM

## 2021-06-11 DIAGNOSIS — E66.01 MORBIDLY OBESE: ICD-10-CM

## 2021-06-11 DIAGNOSIS — G95.11 SPINAL CORD ISCHEMIA CAUSING LOWER EXTREMITY PARAPARESIS: ICD-10-CM

## 2021-06-11 DIAGNOSIS — G82.20 SPINAL CORD ISCHEMIA CAUSING LOWER EXTREMITY PARAPARESIS: ICD-10-CM

## 2021-06-11 DIAGNOSIS — R73.9 HYPERGLYCEMIA: ICD-10-CM

## 2021-06-11 DIAGNOSIS — R60.9 EDEMA, UNSPECIFIED TYPE: ICD-10-CM

## 2021-06-11 DIAGNOSIS — M62.81 MUSCLE WEAKNESS: Primary | ICD-10-CM

## 2021-06-11 PROCEDURE — 99999 PR PBB SHADOW E&M-EST. PATIENT-LVL III: ICD-10-PCS | Mod: PBBFAC,,, | Performed by: FAMILY MEDICINE

## 2021-06-11 PROCEDURE — 99999 PR PBB SHADOW E&M-EST. PATIENT-LVL III: CPT | Mod: PBBFAC,,, | Performed by: FAMILY MEDICINE

## 2021-06-11 PROCEDURE — 3008F PR BODY MASS INDEX (BMI) DOCUMENTED: ICD-10-PCS | Mod: CPTII,S$GLB,, | Performed by: FAMILY MEDICINE

## 2021-06-11 PROCEDURE — 1126F PR PAIN SEVERITY QUANTIFIED, NO PAIN PRESENT: ICD-10-PCS | Mod: S$GLB,,, | Performed by: FAMILY MEDICINE

## 2021-06-11 PROCEDURE — 3008F BODY MASS INDEX DOCD: CPT | Mod: CPTII,S$GLB,, | Performed by: FAMILY MEDICINE

## 2021-06-11 PROCEDURE — 99214 PR OFFICE/OUTPT VISIT, EST, LEVL IV, 30-39 MIN: ICD-10-PCS | Mod: S$GLB,,, | Performed by: FAMILY MEDICINE

## 2021-06-11 PROCEDURE — 99214 OFFICE O/P EST MOD 30 MIN: CPT | Mod: S$GLB,,, | Performed by: FAMILY MEDICINE

## 2021-06-11 PROCEDURE — 1126F AMNT PAIN NOTED NONE PRSNT: CPT | Mod: S$GLB,,, | Performed by: FAMILY MEDICINE

## 2021-06-11 RX ORDER — HYDROCHLOROTHIAZIDE 25 MG/1
25 TABLET ORAL DAILY
Qty: 30 TABLET | Refills: 11 | Status: SHIPPED | OUTPATIENT
Start: 2021-06-11 | End: 2021-09-13 | Stop reason: SDUPTHER

## 2021-06-14 ENCOUNTER — LAB VISIT (OUTPATIENT)
Dept: LAB | Facility: HOSPITAL | Age: 36
End: 2021-06-14
Attending: FAMILY MEDICINE
Payer: MEDICARE

## 2021-06-14 DIAGNOSIS — M62.81 MUSCLE WEAKNESS: ICD-10-CM

## 2021-06-14 DIAGNOSIS — R73.9 HYPERGLYCEMIA: ICD-10-CM

## 2021-06-14 DIAGNOSIS — I10 ESSENTIAL HYPERTENSION: ICD-10-CM

## 2021-06-14 LAB
ALBUMIN/CREAT UR: 16.2 UG/MG (ref 0–30)
CREAT UR-MCNC: 265 MG/DL (ref 23–375)
MICROALBUMIN UR DL<=1MG/L-MCNC: 43 UG/ML

## 2021-06-14 PROCEDURE — 82043 UR ALBUMIN QUANTITATIVE: CPT | Performed by: FAMILY MEDICINE

## 2021-06-14 PROCEDURE — 82570 ASSAY OF URINE CREATININE: CPT | Performed by: FAMILY MEDICINE

## 2021-06-15 ENCOUNTER — TELEPHONE (OUTPATIENT)
Dept: FAMILY MEDICINE | Facility: CLINIC | Age: 36
End: 2021-06-15

## 2021-08-18 ENCOUNTER — TELEPHONE (OUTPATIENT)
Dept: FAMILY MEDICINE | Facility: CLINIC | Age: 36
End: 2021-08-18

## 2021-08-18 RX ORDER — ONDANSETRON 8 MG/1
8 TABLET, ORALLY DISINTEGRATING ORAL EVERY 8 HOURS PRN
Qty: 30 TABLET | Refills: 0 | Status: SHIPPED | OUTPATIENT
Start: 2021-08-18

## 2021-08-18 RX ORDER — PROMETHAZINE HYDROCHLORIDE AND DEXTROMETHORPHAN HYDROBROMIDE 6.25; 15 MG/5ML; MG/5ML
5 SYRUP ORAL EVERY 6 HOURS PRN
Qty: 240 ML | Refills: 1 | Status: SHIPPED | OUTPATIENT
Start: 2021-08-18 | End: 2022-09-21

## 2021-08-18 RX ORDER — PREDNISONE 20 MG/1
20 TABLET ORAL 2 TIMES DAILY
Qty: 10 TABLET | Refills: 0 | Status: SHIPPED | OUTPATIENT
Start: 2021-08-18 | End: 2021-08-23

## 2021-08-20 ENCOUNTER — LAB VISIT (OUTPATIENT)
Dept: FAMILY MEDICINE | Facility: CLINIC | Age: 36
End: 2021-08-20
Payer: MEDICARE

## 2021-08-20 DIAGNOSIS — R05.8 COUGH WITH EXPOSURE TO COVID-19 VIRUS: Primary | ICD-10-CM

## 2021-08-20 DIAGNOSIS — Z20.822 COUGH WITH EXPOSURE TO COVID-19 VIRUS: Primary | ICD-10-CM

## 2021-08-20 PROCEDURE — U0003 INFECTIOUS AGENT DETECTION BY NUCLEIC ACID (DNA OR RNA); SEVERE ACUTE RESPIRATORY SYNDROME CORONAVIRUS 2 (SARS-COV-2) (CORONAVIRUS DISEASE [COVID-19]), AMPLIFIED PROBE TECHNIQUE, MAKING USE OF HIGH THROUGHPUT TECHNOLOGIES AS DESCRIBED BY CMS-2020-01-R: HCPCS | Performed by: FAMILY MEDICINE

## 2021-08-20 PROCEDURE — U0005 INFEC AGEN DETEC AMPLI PROBE: HCPCS | Performed by: FAMILY MEDICINE

## 2021-08-21 LAB — SARS-COV-2- CYCLE NUMBER: 18.33

## 2021-08-22 DIAGNOSIS — U07.1 COVID-19 VIRUS DETECTED: ICD-10-CM

## 2021-08-22 LAB — SARS-COV-2 RNA RESP QL NAA+PROBE: DETECTED

## 2021-08-23 ENCOUNTER — TELEPHONE (OUTPATIENT)
Dept: FAMILY MEDICINE | Facility: CLINIC | Age: 36
End: 2021-08-23

## 2021-08-23 DIAGNOSIS — U07.1 COVID-19: Primary | ICD-10-CM

## 2021-08-24 ENCOUNTER — INFUSION (OUTPATIENT)
Dept: INFECTIOUS DISEASES | Facility: HOSPITAL | Age: 36
End: 2021-08-24
Attending: FAMILY MEDICINE
Payer: MEDICARE

## 2021-08-24 VITALS
HEART RATE: 78 BPM | SYSTOLIC BLOOD PRESSURE: 132 MMHG | TEMPERATURE: 101 F | DIASTOLIC BLOOD PRESSURE: 69 MMHG | OXYGEN SATURATION: 90 % | RESPIRATION RATE: 18 BRPM

## 2021-08-24 DIAGNOSIS — U07.1 COVID-19: ICD-10-CM

## 2021-08-24 PROCEDURE — M0243 CASIRIVI AND IMDEVI INFUSION: HCPCS | Performed by: FAMILY MEDICINE

## 2021-08-24 PROCEDURE — 63600175 PHARM REV CODE 636 W HCPCS: Performed by: FAMILY MEDICINE

## 2021-08-24 RX ORDER — ACETAMINOPHEN 325 MG/1
650 TABLET ORAL ONCE AS NEEDED
Status: ACTIVE | OUTPATIENT
Start: 2021-08-24 | End: 2033-01-20

## 2021-08-24 RX ORDER — SODIUM CHLORIDE 0.9 % (FLUSH) 0.9 %
10 SYRINGE (ML) INJECTION
Status: ACTIVE | OUTPATIENT
Start: 2021-08-24

## 2021-08-24 RX ORDER — EPINEPHRINE 0.3 MG/.3ML
0.3 INJECTION SUBCUTANEOUS
Status: ACTIVE | OUTPATIENT
Start: 2021-08-24

## 2021-08-24 RX ORDER — DIPHENHYDRAMINE HYDROCHLORIDE 50 MG/ML
25 INJECTION INTRAMUSCULAR; INTRAVENOUS ONCE AS NEEDED
Status: ACTIVE | OUTPATIENT
Start: 2021-08-24 | End: 2033-01-20

## 2021-08-24 RX ORDER — ONDANSETRON 4 MG/1
4 TABLET, ORALLY DISINTEGRATING ORAL ONCE AS NEEDED
Status: ACTIVE | OUTPATIENT
Start: 2021-08-24 | End: 2033-01-20

## 2021-08-24 RX ORDER — ALBUTEROL SULFATE 90 UG/1
2 AEROSOL, METERED RESPIRATORY (INHALATION)
Status: DISPENSED | OUTPATIENT
Start: 2021-08-24

## 2021-08-24 RX ADMIN — Medication 600 MG: at 10:08

## 2021-09-13 ENCOUNTER — OFFICE VISIT (OUTPATIENT)
Dept: FAMILY MEDICINE | Facility: CLINIC | Age: 36
End: 2021-09-13
Payer: MEDICARE

## 2021-09-13 VITALS
HEIGHT: 68 IN | BODY MASS INDEX: 47.74 KG/M2 | SYSTOLIC BLOOD PRESSURE: 135 MMHG | DIASTOLIC BLOOD PRESSURE: 75 MMHG | OXYGEN SATURATION: 95 % | WEIGHT: 315 LBS | HEART RATE: 82 BPM | RESPIRATION RATE: 20 BRPM

## 2021-09-13 DIAGNOSIS — R60.9 EDEMA, UNSPECIFIED TYPE: ICD-10-CM

## 2021-09-13 PROCEDURE — 3078F PR MOST RECENT DIASTOLIC BLOOD PRESSURE < 80 MM HG: ICD-10-PCS | Mod: CPTII,S$GLB,, | Performed by: FAMILY MEDICINE

## 2021-09-13 PROCEDURE — 3066F NEPHROPATHY DOC TX: CPT | Mod: CPTII,S$GLB,, | Performed by: FAMILY MEDICINE

## 2021-09-13 PROCEDURE — 3044F PR MOST RECENT HEMOGLOBIN A1C LEVEL <7.0%: ICD-10-PCS | Mod: CPTII,S$GLB,, | Performed by: FAMILY MEDICINE

## 2021-09-13 PROCEDURE — 3075F SYST BP GE 130 - 139MM HG: CPT | Mod: CPTII,S$GLB,, | Performed by: FAMILY MEDICINE

## 2021-09-13 PROCEDURE — 3061F NEG MICROALBUMINURIA REV: CPT | Mod: CPTII,S$GLB,, | Performed by: FAMILY MEDICINE

## 2021-09-13 PROCEDURE — 1160F PR REVIEW ALL MEDS BY PRESCRIBER/CLIN PHARMACIST DOCUMENTED: ICD-10-PCS | Mod: CPTII,S$GLB,, | Performed by: FAMILY MEDICINE

## 2021-09-13 PROCEDURE — 3075F PR MOST RECENT SYSTOLIC BLOOD PRESS GE 130-139MM HG: ICD-10-PCS | Mod: CPTII,S$GLB,, | Performed by: FAMILY MEDICINE

## 2021-09-13 PROCEDURE — 3044F HG A1C LEVEL LT 7.0%: CPT | Mod: CPTII,S$GLB,, | Performed by: FAMILY MEDICINE

## 2021-09-13 PROCEDURE — 99214 OFFICE O/P EST MOD 30 MIN: CPT | Mod: S$GLB,,, | Performed by: FAMILY MEDICINE

## 2021-09-13 PROCEDURE — 99999 PR PBB SHADOW E&M-EST. PATIENT-LVL IV: CPT | Mod: PBBFAC,,, | Performed by: FAMILY MEDICINE

## 2021-09-13 PROCEDURE — 3008F BODY MASS INDEX DOCD: CPT | Mod: CPTII,S$GLB,, | Performed by: FAMILY MEDICINE

## 2021-09-13 PROCEDURE — 99214 PR OFFICE/OUTPT VISIT, EST, LEVL IV, 30-39 MIN: ICD-10-PCS | Mod: S$GLB,,, | Performed by: FAMILY MEDICINE

## 2021-09-13 PROCEDURE — 3008F PR BODY MASS INDEX (BMI) DOCUMENTED: ICD-10-PCS | Mod: CPTII,S$GLB,, | Performed by: FAMILY MEDICINE

## 2021-09-13 PROCEDURE — 3078F DIAST BP <80 MM HG: CPT | Mod: CPTII,S$GLB,, | Performed by: FAMILY MEDICINE

## 2021-09-13 PROCEDURE — 99999 PR PBB SHADOW E&M-EST. PATIENT-LVL IV: ICD-10-PCS | Mod: PBBFAC,,, | Performed by: FAMILY MEDICINE

## 2021-09-13 PROCEDURE — 1160F RVW MEDS BY RX/DR IN RCRD: CPT | Mod: CPTII,S$GLB,, | Performed by: FAMILY MEDICINE

## 2021-09-13 PROCEDURE — 1159F PR MEDICATION LIST DOCUMENTED IN MEDICAL RECORD: ICD-10-PCS | Mod: CPTII,S$GLB,, | Performed by: FAMILY MEDICINE

## 2021-09-13 PROCEDURE — 3061F PR NEG MICROALBUMINURIA RESULT DOCUMENTED/REVIEW: ICD-10-PCS | Mod: CPTII,S$GLB,, | Performed by: FAMILY MEDICINE

## 2021-09-13 PROCEDURE — 3066F PR DOCUMENTATION OF TREATMENT FOR NEPHROPATHY: ICD-10-PCS | Mod: CPTII,S$GLB,, | Performed by: FAMILY MEDICINE

## 2021-09-13 PROCEDURE — 1159F MED LIST DOCD IN RCRD: CPT | Mod: CPTII,S$GLB,, | Performed by: FAMILY MEDICINE

## 2021-09-13 RX ORDER — HYDROCHLOROTHIAZIDE 50 MG/1
50 TABLET ORAL DAILY
Qty: 90 TABLET | Refills: 3 | Status: SHIPPED | OUTPATIENT
Start: 2021-09-13 | End: 2022-10-12 | Stop reason: SDUPTHER

## 2021-09-13 RX ORDER — DOXYCYCLINE HYCLATE 100 MG
100 TABLET ORAL 2 TIMES DAILY
Qty: 20 TABLET | Refills: 0 | Status: SHIPPED | OUTPATIENT
Start: 2021-09-13 | End: 2022-06-21

## 2021-12-20 ENCOUNTER — OFFICE VISIT (OUTPATIENT)
Dept: FAMILY MEDICINE | Facility: CLINIC | Age: 36
End: 2021-12-20
Payer: MEDICARE

## 2021-12-20 VITALS
SYSTOLIC BLOOD PRESSURE: 121 MMHG | DIASTOLIC BLOOD PRESSURE: 79 MMHG | RESPIRATION RATE: 17 BRPM | WEIGHT: 315 LBS | BODY MASS INDEX: 47.74 KG/M2 | HEIGHT: 68 IN | HEART RATE: 74 BPM | OXYGEN SATURATION: 98 %

## 2021-12-20 DIAGNOSIS — Z23 NEED FOR VACCINATION: ICD-10-CM

## 2021-12-20 DIAGNOSIS — G95.11 SPINAL CORD ISCHEMIA CAUSING LOWER EXTREMITY PARAPARESIS: Primary | ICD-10-CM

## 2021-12-20 DIAGNOSIS — G82.20 SPINAL CORD ISCHEMIA CAUSING LOWER EXTREMITY PARAPARESIS: Primary | ICD-10-CM

## 2021-12-20 PROCEDURE — 3066F PR DOCUMENTATION OF TREATMENT FOR NEPHROPATHY: ICD-10-PCS | Mod: CPTII,S$GLB,, | Performed by: FAMILY MEDICINE

## 2021-12-20 PROCEDURE — 99213 PR OFFICE/OUTPT VISIT, EST, LEVL III, 20-29 MIN: ICD-10-PCS | Mod: S$GLB,,, | Performed by: FAMILY MEDICINE

## 2021-12-20 PROCEDURE — 99213 OFFICE O/P EST LOW 20 MIN: CPT | Mod: S$GLB,,, | Performed by: FAMILY MEDICINE

## 2021-12-20 PROCEDURE — 99999 PR PBB SHADOW E&M-EST. PATIENT-LVL V: CPT | Mod: PBBFAC,,, | Performed by: FAMILY MEDICINE

## 2021-12-20 PROCEDURE — 99999 PR PBB SHADOW E&M-EST. PATIENT-LVL V: ICD-10-PCS | Mod: PBBFAC,,, | Performed by: FAMILY MEDICINE

## 2021-12-20 PROCEDURE — 90686 IIV4 VACC NO PRSV 0.5 ML IM: CPT | Mod: PBBFAC,PN

## 2021-12-20 PROCEDURE — 90472 IMMUNIZATION ADMIN EACH ADD: CPT | Mod: PBBFAC,PN

## 2021-12-20 PROCEDURE — 3061F PR NEG MICROALBUMINURIA RESULT DOCUMENTED/REVIEW: ICD-10-PCS | Mod: CPTII,S$GLB,, | Performed by: FAMILY MEDICINE

## 2021-12-20 PROCEDURE — 3061F NEG MICROALBUMINURIA REV: CPT | Mod: CPTII,S$GLB,, | Performed by: FAMILY MEDICINE

## 2021-12-20 PROCEDURE — 90714 TD VACC NO PRESV 7 YRS+ IM: CPT | Mod: PBBFAC,PN

## 2021-12-20 PROCEDURE — 99215 OFFICE O/P EST HI 40 MIN: CPT | Mod: PBBFAC,PN | Performed by: FAMILY MEDICINE

## 2021-12-20 PROCEDURE — 3066F NEPHROPATHY DOC TX: CPT | Mod: CPTII,S$GLB,, | Performed by: FAMILY MEDICINE

## 2022-03-21 ENCOUNTER — OFFICE VISIT (OUTPATIENT)
Dept: FAMILY MEDICINE | Facility: CLINIC | Age: 37
End: 2022-03-21
Payer: MEDICAID

## 2022-03-21 VITALS
HEART RATE: 85 BPM | BODY MASS INDEX: 47.74 KG/M2 | RESPIRATION RATE: 20 BRPM | SYSTOLIC BLOOD PRESSURE: 133 MMHG | DIASTOLIC BLOOD PRESSURE: 86 MMHG | WEIGHT: 315 LBS | OXYGEN SATURATION: 97 % | HEIGHT: 68 IN

## 2022-03-21 DIAGNOSIS — R60.9 EDEMA, UNSPECIFIED TYPE: Primary | ICD-10-CM

## 2022-03-21 DIAGNOSIS — E66.01 MORBIDLY OBESE: ICD-10-CM

## 2022-03-21 DIAGNOSIS — G95.11 SPINAL CORD ISCHEMIA CAUSING LOWER EXTREMITY PARAPARESIS: ICD-10-CM

## 2022-03-21 DIAGNOSIS — G82.20 SPINAL CORD ISCHEMIA CAUSING LOWER EXTREMITY PARAPARESIS: ICD-10-CM

## 2022-03-21 PROCEDURE — 3008F BODY MASS INDEX DOCD: CPT | Mod: CPTII,S$GLB,, | Performed by: FAMILY MEDICINE

## 2022-03-21 PROCEDURE — 3075F SYST BP GE 130 - 139MM HG: CPT | Mod: CPTII,S$GLB,, | Performed by: FAMILY MEDICINE

## 2022-03-21 PROCEDURE — 99999 PR PBB SHADOW E&M-EST. PATIENT-LVL V: ICD-10-PCS | Mod: PBBFAC,,, | Performed by: FAMILY MEDICINE

## 2022-03-21 PROCEDURE — 1160F PR REVIEW ALL MEDS BY PRESCRIBER/CLIN PHARMACIST DOCUMENTED: ICD-10-PCS | Mod: CPTII,S$GLB,, | Performed by: FAMILY MEDICINE

## 2022-03-21 PROCEDURE — 99214 PR OFFICE/OUTPT VISIT, EST, LEVL IV, 30-39 MIN: ICD-10-PCS | Mod: S$GLB,,, | Performed by: FAMILY MEDICINE

## 2022-03-21 PROCEDURE — 99215 OFFICE O/P EST HI 40 MIN: CPT | Mod: PBBFAC,PN | Performed by: FAMILY MEDICINE

## 2022-03-21 PROCEDURE — 3079F PR MOST RECENT DIASTOLIC BLOOD PRESSURE 80-89 MM HG: ICD-10-PCS | Mod: CPTII,S$GLB,, | Performed by: FAMILY MEDICINE

## 2022-03-21 PROCEDURE — 3079F DIAST BP 80-89 MM HG: CPT | Mod: CPTII,S$GLB,, | Performed by: FAMILY MEDICINE

## 2022-03-21 PROCEDURE — 1160F RVW MEDS BY RX/DR IN RCRD: CPT | Mod: CPTII,S$GLB,, | Performed by: FAMILY MEDICINE

## 2022-03-21 PROCEDURE — 99214 OFFICE O/P EST MOD 30 MIN: CPT | Mod: S$GLB,,, | Performed by: FAMILY MEDICINE

## 2022-03-21 PROCEDURE — 99999 PR PBB SHADOW E&M-EST. PATIENT-LVL V: CPT | Mod: PBBFAC,,, | Performed by: FAMILY MEDICINE

## 2022-03-21 PROCEDURE — 3008F PR BODY MASS INDEX (BMI) DOCUMENTED: ICD-10-PCS | Mod: CPTII,S$GLB,, | Performed by: FAMILY MEDICINE

## 2022-03-21 PROCEDURE — 3075F PR MOST RECENT SYSTOLIC BLOOD PRESS GE 130-139MM HG: ICD-10-PCS | Mod: CPTII,S$GLB,, | Performed by: FAMILY MEDICINE

## 2022-03-21 PROCEDURE — 1159F PR MEDICATION LIST DOCUMENTED IN MEDICAL RECORD: ICD-10-PCS | Mod: CPTII,S$GLB,, | Performed by: FAMILY MEDICINE

## 2022-03-21 PROCEDURE — 1159F MED LIST DOCD IN RCRD: CPT | Mod: CPTII,S$GLB,, | Performed by: FAMILY MEDICINE

## 2022-03-21 RX ORDER — FUROSEMIDE 20 MG/1
20 TABLET ORAL DAILY
Qty: 90 TABLET | Refills: 3 | Status: SHIPPED | OUTPATIENT
Start: 2022-03-21 | End: 2022-06-21 | Stop reason: SDUPTHER

## 2022-03-21 RX ORDER — POTASSIUM CHLORIDE 750 MG/1
10 CAPSULE, EXTENDED RELEASE ORAL DAILY
Qty: 90 CAPSULE | Refills: 3 | Status: SHIPPED | OUTPATIENT
Start: 2022-03-21 | End: 2023-01-17

## 2022-03-21 NOTE — PROGRESS NOTES
" Patient ID: Meek Hanson is a 36 y.o. male.    Chief Complaint: Follow-up and Leg Swelling (bilateral)      Reviewed family, medical, surgical, and social history.    No cp/sob  No change in mental status  No fever  No asymmetrical limb swelling    Objective:      /86 (BP Location: Left arm, Patient Position: Sitting, BP Method: Large (Automatic))   Pulse 85   Resp 20   Ht 5' 8" (1.727 m)   Wt (!) 150.6 kg (332 lb 1.6 oz)   SpO2 97%   BMI 50.50 kg/m²   RRR, CTAB, s/nt/nd, no c/c/e, non-toxic appearing, no focal weakness  Assessment:       1. Edema, unspecified type    2. Spinal cord ischemia causing lower extremity paraparesis    3. Morbidly obese        Plan:       Edema, unspecified type  -     furosemide (LASIX) 20 MG tablet; Take 1 tablet (20 mg total) by mouth once daily.  Dispense: 90 tablet; Refill: 3  -     potassium chloride (MICRO-K) 10 MEQ CpSR; Take 1 capsule (10 mEq total) by mouth once daily. for 90 doses  Dispense: 90 capsule; Refill: 3    Spinal cord ischemia causing lower extremity paraparesis    Morbidly obese            Reviewed, monitored, evaluated, and assessed chronic conditions as outlined above  Continue current medicines, any changes noted above  As shown, add lasix for edema, recheck in 3 months  Labs, radiology studies, and procedures/notes from the last 3 months were reviewed.    Risks, benefits, and side effects were discussed with the patient. All questions were answered to the fullest satisfaction of the patient, and pt verbalized understanding and agreement to treatment plan. Pt was to call with any new or worsening symptoms, or present to the ER.    "

## 2022-05-11 DIAGNOSIS — Z11.59 NEED FOR HEPATITIS C SCREENING TEST: ICD-10-CM

## 2022-06-21 ENCOUNTER — OFFICE VISIT (OUTPATIENT)
Dept: FAMILY MEDICINE | Facility: CLINIC | Age: 37
End: 2022-06-21
Payer: MEDICARE

## 2022-06-21 VITALS
DIASTOLIC BLOOD PRESSURE: 82 MMHG | OXYGEN SATURATION: 96 % | RESPIRATION RATE: 14 BRPM | WEIGHT: 315 LBS | HEART RATE: 66 BPM | SYSTOLIC BLOOD PRESSURE: 136 MMHG | BODY MASS INDEX: 47.74 KG/M2 | HEIGHT: 68 IN

## 2022-06-21 DIAGNOSIS — R60.9 EDEMA, UNSPECIFIED TYPE: ICD-10-CM

## 2022-06-21 DIAGNOSIS — R60.1 GENERALIZED EDEMA: Primary | ICD-10-CM

## 2022-06-21 DIAGNOSIS — R21 RASH: ICD-10-CM

## 2022-06-21 PROCEDURE — 1159F PR MEDICATION LIST DOCUMENTED IN MEDICAL RECORD: ICD-10-PCS | Mod: CPTII,S$GLB,, | Performed by: FAMILY MEDICINE

## 2022-06-21 PROCEDURE — 3079F PR MOST RECENT DIASTOLIC BLOOD PRESSURE 80-89 MM HG: ICD-10-PCS | Mod: CPTII,S$GLB,, | Performed by: FAMILY MEDICINE

## 2022-06-21 PROCEDURE — 3075F PR MOST RECENT SYSTOLIC BLOOD PRESS GE 130-139MM HG: ICD-10-PCS | Mod: CPTII,S$GLB,, | Performed by: FAMILY MEDICINE

## 2022-06-21 PROCEDURE — 99999 PR PBB SHADOW E&M-EST. PATIENT-LVL III: ICD-10-PCS | Mod: PBBFAC,,, | Performed by: FAMILY MEDICINE

## 2022-06-21 PROCEDURE — 1160F RVW MEDS BY RX/DR IN RCRD: CPT | Mod: CPTII,S$GLB,, | Performed by: FAMILY MEDICINE

## 2022-06-21 PROCEDURE — 1159F MED LIST DOCD IN RCRD: CPT | Mod: CPTII,S$GLB,, | Performed by: FAMILY MEDICINE

## 2022-06-21 PROCEDURE — 3008F PR BODY MASS INDEX (BMI) DOCUMENTED: ICD-10-PCS | Mod: CPTII,S$GLB,, | Performed by: FAMILY MEDICINE

## 2022-06-21 PROCEDURE — 99214 PR OFFICE/OUTPT VISIT, EST, LEVL IV, 30-39 MIN: ICD-10-PCS | Mod: S$GLB,,, | Performed by: FAMILY MEDICINE

## 2022-06-21 PROCEDURE — 99999 PR PBB SHADOW E&M-EST. PATIENT-LVL III: CPT | Mod: PBBFAC,,, | Performed by: FAMILY MEDICINE

## 2022-06-21 PROCEDURE — 3008F BODY MASS INDEX DOCD: CPT | Mod: CPTII,S$GLB,, | Performed by: FAMILY MEDICINE

## 2022-06-21 PROCEDURE — 99213 OFFICE O/P EST LOW 20 MIN: CPT | Mod: PBBFAC,PN | Performed by: FAMILY MEDICINE

## 2022-06-21 PROCEDURE — 3079F DIAST BP 80-89 MM HG: CPT | Mod: CPTII,S$GLB,, | Performed by: FAMILY MEDICINE

## 2022-06-21 PROCEDURE — 1160F PR REVIEW ALL MEDS BY PRESCRIBER/CLIN PHARMACIST DOCUMENTED: ICD-10-PCS | Mod: CPTII,S$GLB,, | Performed by: FAMILY MEDICINE

## 2022-06-21 PROCEDURE — 3075F SYST BP GE 130 - 139MM HG: CPT | Mod: CPTII,S$GLB,, | Performed by: FAMILY MEDICINE

## 2022-06-21 PROCEDURE — 99214 OFFICE O/P EST MOD 30 MIN: CPT | Mod: S$GLB,,, | Performed by: FAMILY MEDICINE

## 2022-06-21 RX ORDER — FUROSEMIDE 20 MG/1
20 TABLET ORAL 2 TIMES DAILY
Qty: 180 TABLET | Refills: 3 | Status: SHIPPED | OUTPATIENT
Start: 2022-06-21 | End: 2023-04-27

## 2022-06-21 RX ORDER — DOXYCYCLINE HYCLATE 100 MG
100 TABLET ORAL 2 TIMES DAILY
Qty: 20 TABLET | Refills: 0 | Status: SHIPPED | OUTPATIENT
Start: 2022-06-21 | End: 2022-09-21

## 2022-06-21 NOTE — PROGRESS NOTES
" Patient ID: Meek Hanson is a 37 y.o. male.    Chief Complaint: Follow-up, Leg Swelling (bilateral), and Rash (R thigh)      Reviewed family, medical, surgical, and social history.    No cp/sob  No change in mental status  No fever  No asymmetrical limb swelling    Objective:      /82 (BP Location: Right arm, Patient Position: Sitting, BP Method: Large (Automatic))   Pulse 66   Resp 14   Ht 5' 8" (1.727 m)   Wt (!) 148.8 kg (328 lb)   SpO2 96%   BMI 49.87 kg/m²   RRR, CTAB, s/nt/nd, no c/c/e, non-toxic appearing, no focal weakness  Assessment:       1. Generalized edema    2. Rash    3. Edema, unspecified type        Plan:       Generalized edema    Rash  -     doxycycline (VIBRA-TABS) 100 MG tablet; Take 1 tablet (100 mg total) by mouth 2 (two) times daily.  Dispense: 20 tablet; Refill: 0    Edema, unspecified type  -     furosemide (LASIX) 20 MG tablet; Take 1 tablet (20 mg total) by mouth 2 (two) times a day.  Dispense: 180 tablet; Refill: 3            Reviewed, monitored, evaluated, and assessed chronic conditions as outlined above  Continue current medicines, any changes noted above  As shown  Labs, radiology studies, and procedures/notes from the last 3 months were reviewed.    Risks, benefits, and side effects were discussed with the patient. All questions were answered to the fullest satisfaction of the patient, and pt verbalized understanding and agreement to treatment plan. Pt was to call with any new or worsening symptoms, or present to the ER.    "

## 2022-09-14 DIAGNOSIS — I10 ESSENTIAL HYPERTENSION: ICD-10-CM

## 2022-09-21 ENCOUNTER — OFFICE VISIT (OUTPATIENT)
Dept: FAMILY MEDICINE | Facility: CLINIC | Age: 37
End: 2022-09-21
Payer: MEDICARE

## 2022-09-21 VITALS
BODY MASS INDEX: 47.74 KG/M2 | RESPIRATION RATE: 19 BRPM | OXYGEN SATURATION: 98 % | HEIGHT: 68 IN | WEIGHT: 315 LBS | HEART RATE: 73 BPM | DIASTOLIC BLOOD PRESSURE: 84 MMHG | SYSTOLIC BLOOD PRESSURE: 136 MMHG

## 2022-09-21 DIAGNOSIS — R60.0 PERIPHERAL EDEMA: ICD-10-CM

## 2022-09-21 DIAGNOSIS — M62.81 MUSCLE WEAKNESS: ICD-10-CM

## 2022-09-21 DIAGNOSIS — R73.9 HYPERGLYCEMIA: ICD-10-CM

## 2022-09-21 DIAGNOSIS — G82.20 SPINAL CORD ISCHEMIA CAUSING LOWER EXTREMITY PARAPARESIS: ICD-10-CM

## 2022-09-21 DIAGNOSIS — I10 ESSENTIAL HYPERTENSION: Primary | ICD-10-CM

## 2022-09-21 DIAGNOSIS — G95.11 SPINAL CORD ISCHEMIA CAUSING LOWER EXTREMITY PARAPARESIS: ICD-10-CM

## 2022-09-21 PROCEDURE — 99999 PR PBB SHADOW E&M-EST. PATIENT-LVL IV: ICD-10-PCS | Mod: PBBFAC,,, | Performed by: FAMILY MEDICINE

## 2022-09-21 PROCEDURE — 3008F BODY MASS INDEX DOCD: CPT | Mod: CPTII,S$GLB,, | Performed by: FAMILY MEDICINE

## 2022-09-21 PROCEDURE — 1159F MED LIST DOCD IN RCRD: CPT | Mod: CPTII,S$GLB,, | Performed by: FAMILY MEDICINE

## 2022-09-21 PROCEDURE — 3075F PR MOST RECENT SYSTOLIC BLOOD PRESS GE 130-139MM HG: ICD-10-PCS | Mod: CPTII,S$GLB,, | Performed by: FAMILY MEDICINE

## 2022-09-21 PROCEDURE — 3079F PR MOST RECENT DIASTOLIC BLOOD PRESSURE 80-89 MM HG: ICD-10-PCS | Mod: CPTII,S$GLB,, | Performed by: FAMILY MEDICINE

## 2022-09-21 PROCEDURE — 3075F SYST BP GE 130 - 139MM HG: CPT | Mod: CPTII,S$GLB,, | Performed by: FAMILY MEDICINE

## 2022-09-21 PROCEDURE — 99214 OFFICE O/P EST MOD 30 MIN: CPT | Mod: S$GLB,,, | Performed by: FAMILY MEDICINE

## 2022-09-21 PROCEDURE — 3079F DIAST BP 80-89 MM HG: CPT | Mod: CPTII,S$GLB,, | Performed by: FAMILY MEDICINE

## 2022-09-21 PROCEDURE — 3008F PR BODY MASS INDEX (BMI) DOCUMENTED: ICD-10-PCS | Mod: CPTII,S$GLB,, | Performed by: FAMILY MEDICINE

## 2022-09-21 PROCEDURE — 1159F PR MEDICATION LIST DOCUMENTED IN MEDICAL RECORD: ICD-10-PCS | Mod: CPTII,S$GLB,, | Performed by: FAMILY MEDICINE

## 2022-09-21 PROCEDURE — 99214 PR OFFICE/OUTPT VISIT, EST, LEVL IV, 30-39 MIN: ICD-10-PCS | Mod: S$GLB,,, | Performed by: FAMILY MEDICINE

## 2022-09-21 PROCEDURE — 99999 PR PBB SHADOW E&M-EST. PATIENT-LVL IV: CPT | Mod: PBBFAC,,, | Performed by: FAMILY MEDICINE

## 2022-09-21 PROCEDURE — 1160F PR REVIEW ALL MEDS BY PRESCRIBER/CLIN PHARMACIST DOCUMENTED: ICD-10-PCS | Mod: CPTII,S$GLB,, | Performed by: FAMILY MEDICINE

## 2022-09-21 PROCEDURE — 1160F RVW MEDS BY RX/DR IN RCRD: CPT | Mod: CPTII,S$GLB,, | Performed by: FAMILY MEDICINE
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
## 2022-09-27 ENCOUNTER — LAB VISIT (OUTPATIENT)
Dept: LAB | Facility: HOSPITAL | Age: 37
End: 2022-09-27
Attending: FAMILY MEDICINE
Payer: MEDICARE

## 2022-09-27 DIAGNOSIS — I10 ESSENTIAL HYPERTENSION: ICD-10-CM

## 2022-09-27 DIAGNOSIS — R73.9 HYPERGLYCEMIA: ICD-10-CM

## 2022-09-27 DIAGNOSIS — Z11.59 NEED FOR HEPATITIS C SCREENING TEST: ICD-10-CM

## 2022-09-27 LAB
ALBUMIN SERPL BCP-MCNC: 3.7 G/DL (ref 3.5–5.2)
ALBUMIN SERPL BCP-MCNC: 3.7 G/DL (ref 3.5–5.2)
ALP SERPL-CCNC: 93 U/L (ref 55–135)
ALP SERPL-CCNC: 93 U/L (ref 55–135)
ALT SERPL W/O P-5'-P-CCNC: 34 U/L (ref 10–44)
ALT SERPL W/O P-5'-P-CCNC: 34 U/L (ref 10–44)
ANION GAP SERPL CALC-SCNC: 12 MMOL/L (ref 8–16)
ANION GAP SERPL CALC-SCNC: 12 MMOL/L (ref 8–16)
AST SERPL-CCNC: 29 U/L (ref 10–40)
AST SERPL-CCNC: 29 U/L (ref 10–40)
BASOPHILS # BLD AUTO: 0.07 K/UL (ref 0–0.2)
BASOPHILS NFR BLD: 0.9 % (ref 0–1.9)
BILIRUB SERPL-MCNC: 0.7 MG/DL (ref 0.1–1)
BILIRUB SERPL-MCNC: 0.7 MG/DL (ref 0.1–1)
BUN SERPL-MCNC: 12 MG/DL (ref 6–20)
BUN SERPL-MCNC: 12 MG/DL (ref 6–20)
CALCIUM SERPL-MCNC: 9.3 MG/DL (ref 8.7–10.5)
CALCIUM SERPL-MCNC: 9.3 MG/DL (ref 8.7–10.5)
CHLORIDE SERPL-SCNC: 102 MMOL/L (ref 95–110)
CHLORIDE SERPL-SCNC: 102 MMOL/L (ref 95–110)
CHOLEST SERPL-MCNC: 175 MG/DL (ref 120–199)
CHOLEST/HDLC SERPL: 5.6 {RATIO} (ref 2–5)
CO2 SERPL-SCNC: 26 MMOL/L (ref 23–29)
CO2 SERPL-SCNC: 26 MMOL/L (ref 23–29)
CREAT SERPL-MCNC: 1 MG/DL (ref 0.5–1.4)
CREAT SERPL-MCNC: 1 MG/DL (ref 0.5–1.4)
DIFFERENTIAL METHOD: ABNORMAL
EOSINOPHIL # BLD AUTO: 0.3 K/UL (ref 0–0.5)
EOSINOPHIL NFR BLD: 4.2 % (ref 0–8)
ERYTHROCYTE [DISTWIDTH] IN BLOOD BY AUTOMATED COUNT: 13 % (ref 11.5–14.5)
EST. GFR  (NO RACE VARIABLE): >60 ML/MIN/1.73 M^2
EST. GFR  (NO RACE VARIABLE): >60 ML/MIN/1.73 M^2
ESTIMATED AVG GLUCOSE: 103 MG/DL (ref 68–131)
GLUCOSE SERPL-MCNC: 110 MG/DL (ref 70–110)
GLUCOSE SERPL-MCNC: 110 MG/DL (ref 70–110)
HBA1C MFR BLD: 5.2 % (ref 4–5.6)
HCT VFR BLD AUTO: 43.5 % (ref 40–54)
HCV AB SERPL QL IA: NORMAL
HDLC SERPL-MCNC: 31 MG/DL (ref 40–75)
HDLC SERPL: 17.7 % (ref 20–50)
HGB BLD-MCNC: 14.3 G/DL (ref 14–18)
IMM GRANULOCYTES # BLD AUTO: 0.06 K/UL (ref 0–0.04)
IMM GRANULOCYTES NFR BLD AUTO: 0.8 % (ref 0–0.5)
LDLC SERPL CALC-MCNC: 122.2 MG/DL (ref 63–159)
LYMPHOCYTES # BLD AUTO: 2.2 K/UL (ref 1–4.8)
LYMPHOCYTES NFR BLD: 29.9 % (ref 18–48)
MCH RBC QN AUTO: 29 PG (ref 27–31)
MCHC RBC AUTO-ENTMCNC: 32.9 G/DL (ref 32–36)
MCV RBC AUTO: 88 FL (ref 82–98)
MONOCYTES # BLD AUTO: 0.5 K/UL (ref 0.3–1)
MONOCYTES NFR BLD: 6.7 % (ref 4–15)
NEUTROPHILS # BLD AUTO: 4.3 K/UL (ref 1.8–7.7)
NEUTROPHILS NFR BLD: 57.5 % (ref 38–73)
NONHDLC SERPL-MCNC: 144 MG/DL
NRBC BLD-RTO: 0 /100 WBC
PLATELET # BLD AUTO: 283 K/UL (ref 150–450)
PMV BLD AUTO: 8.8 FL (ref 9.2–12.9)
POTASSIUM SERPL-SCNC: 3.8 MMOL/L (ref 3.5–5.1)
POTASSIUM SERPL-SCNC: 3.8 MMOL/L (ref 3.5–5.1)
PROT SERPL-MCNC: 8.5 G/DL (ref 6–8.4)
PROT SERPL-MCNC: 8.5 G/DL (ref 6–8.4)
RBC # BLD AUTO: 4.93 M/UL (ref 4.6–6.2)
SODIUM SERPL-SCNC: 140 MMOL/L (ref 136–145)
SODIUM SERPL-SCNC: 140 MMOL/L (ref 136–145)
T4 FREE SERPL-MCNC: 1.17 NG/DL (ref 0.71–1.51)
TRIGL SERPL-MCNC: 109 MG/DL (ref 30–150)
TSH SERPL DL<=0.005 MIU/L-ACNC: 3.04 UIU/ML (ref 0.4–4)
WBC # BLD AUTO: 7.43 K/UL (ref 3.9–12.7)

## 2022-09-27 PROCEDURE — 83036 HEMOGLOBIN GLYCOSYLATED A1C: CPT | Performed by: FAMILY MEDICINE

## 2022-09-27 PROCEDURE — 84439 ASSAY OF FREE THYROXINE: CPT | Performed by: FAMILY MEDICINE

## 2022-09-27 PROCEDURE — 80053 COMPREHEN METABOLIC PANEL: CPT | Performed by: FAMILY MEDICINE

## 2022-09-27 PROCEDURE — 36415 COLL VENOUS BLD VENIPUNCTURE: CPT | Performed by: FAMILY MEDICINE

## 2022-09-27 PROCEDURE — 85025 COMPLETE CBC W/AUTO DIFF WBC: CPT | Performed by: FAMILY MEDICINE

## 2022-09-27 PROCEDURE — 86803 HEPATITIS C AB TEST: CPT | Performed by: FAMILY MEDICINE

## 2022-09-27 PROCEDURE — 80061 LIPID PANEL: CPT | Performed by: FAMILY MEDICINE

## 2022-09-27 PROCEDURE — 84443 ASSAY THYROID STIM HORMONE: CPT | Performed by: FAMILY MEDICINE

## 2023-01-13 ENCOUNTER — OFFICE VISIT (OUTPATIENT)
Dept: FAMILY MEDICINE | Facility: CLINIC | Age: 38
End: 2023-01-13
Payer: MEDICARE

## 2023-01-13 VITALS
WEIGHT: 315 LBS | DIASTOLIC BLOOD PRESSURE: 85 MMHG | SYSTOLIC BLOOD PRESSURE: 130 MMHG | HEIGHT: 68 IN | RESPIRATION RATE: 18 BRPM | OXYGEN SATURATION: 97 % | BODY MASS INDEX: 47.74 KG/M2 | HEART RATE: 80 BPM

## 2023-01-13 DIAGNOSIS — G82.20 SPINAL CORD ISCHEMIA CAUSING LOWER EXTREMITY PARAPARESIS: ICD-10-CM

## 2023-01-13 DIAGNOSIS — R60.0 PERIPHERAL EDEMA: ICD-10-CM

## 2023-01-13 DIAGNOSIS — I10 ESSENTIAL HYPERTENSION: Primary | ICD-10-CM

## 2023-01-13 DIAGNOSIS — Z23 NEED FOR VACCINATION: ICD-10-CM

## 2023-01-13 DIAGNOSIS — G95.11 SPINAL CORD ISCHEMIA CAUSING LOWER EXTREMITY PARAPARESIS: ICD-10-CM

## 2023-01-13 DIAGNOSIS — E66.01 MORBIDLY OBESE: ICD-10-CM

## 2023-01-13 PROCEDURE — 1160F PR REVIEW ALL MEDS BY PRESCRIBER/CLIN PHARMACIST DOCUMENTED: ICD-10-PCS | Mod: CPTII,S$GLB,, | Performed by: FAMILY MEDICINE

## 2023-01-13 PROCEDURE — 99214 OFFICE O/P EST MOD 30 MIN: CPT | Mod: S$GLB,,, | Performed by: FAMILY MEDICINE

## 2023-01-13 PROCEDURE — 99999 PR PBB SHADOW E&M-EST. PATIENT-LVL V: CPT | Mod: PBBFAC,,, | Performed by: FAMILY MEDICINE

## 2023-01-13 PROCEDURE — G0008 ADMIN INFLUENZA VIRUS VAC: HCPCS | Mod: S$GLB,,, | Performed by: FAMILY MEDICINE

## 2023-01-13 PROCEDURE — 3008F PR BODY MASS INDEX (BMI) DOCUMENTED: ICD-10-PCS | Mod: CPTII,S$GLB,, | Performed by: FAMILY MEDICINE

## 2023-01-13 PROCEDURE — 3079F PR MOST RECENT DIASTOLIC BLOOD PRESSURE 80-89 MM HG: ICD-10-PCS | Mod: CPTII,S$GLB,, | Performed by: FAMILY MEDICINE

## 2023-01-13 PROCEDURE — 3075F PR MOST RECENT SYSTOLIC BLOOD PRESS GE 130-139MM HG: ICD-10-PCS | Mod: CPTII,S$GLB,, | Performed by: FAMILY MEDICINE

## 2023-01-13 PROCEDURE — 99999 PR PBB SHADOW E&M-EST. PATIENT-LVL V: ICD-10-PCS | Mod: PBBFAC,,, | Performed by: FAMILY MEDICINE

## 2023-01-13 PROCEDURE — G0008 FLU VACCINE (QUAD) GREATER THAN OR EQUAL TO 3YO PRESERVATIVE FREE IM: ICD-10-PCS | Mod: S$GLB,,, | Performed by: FAMILY MEDICINE

## 2023-01-13 PROCEDURE — 1159F MED LIST DOCD IN RCRD: CPT | Mod: CPTII,S$GLB,, | Performed by: FAMILY MEDICINE

## 2023-01-13 PROCEDURE — 3075F SYST BP GE 130 - 139MM HG: CPT | Mod: CPTII,S$GLB,, | Performed by: FAMILY MEDICINE

## 2023-01-13 PROCEDURE — 3079F DIAST BP 80-89 MM HG: CPT | Mod: CPTII,S$GLB,, | Performed by: FAMILY MEDICINE

## 2023-01-13 PROCEDURE — 1160F RVW MEDS BY RX/DR IN RCRD: CPT | Mod: CPTII,S$GLB,, | Performed by: FAMILY MEDICINE

## 2023-01-13 PROCEDURE — 90686 IIV4 VACC NO PRSV 0.5 ML IM: CPT | Mod: S$GLB,,, | Performed by: FAMILY MEDICINE

## 2023-01-13 PROCEDURE — 1159F PR MEDICATION LIST DOCUMENTED IN MEDICAL RECORD: ICD-10-PCS | Mod: CPTII,S$GLB,, | Performed by: FAMILY MEDICINE

## 2023-01-13 PROCEDURE — 3008F BODY MASS INDEX DOCD: CPT | Mod: CPTII,S$GLB,, | Performed by: FAMILY MEDICINE

## 2023-01-13 PROCEDURE — 90686 FLU VACCINE (QUAD) GREATER THAN OR EQUAL TO 3YO PRESERVATIVE FREE IM: ICD-10-PCS | Mod: S$GLB,,, | Performed by: FAMILY MEDICINE

## 2023-01-13 PROCEDURE — 99214 PR OFFICE/OUTPT VISIT, EST, LEVL IV, 30-39 MIN: ICD-10-PCS | Mod: S$GLB,,, | Performed by: FAMILY MEDICINE
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
## 2023-04-14 ENCOUNTER — OFFICE VISIT (OUTPATIENT)
Dept: FAMILY MEDICINE | Facility: CLINIC | Age: 38
End: 2023-04-14
Payer: MEDICARE

## 2023-04-14 VITALS
HEIGHT: 68 IN | OXYGEN SATURATION: 96 % | DIASTOLIC BLOOD PRESSURE: 87 MMHG | SYSTOLIC BLOOD PRESSURE: 137 MMHG | RESPIRATION RATE: 14 BRPM | WEIGHT: 315 LBS | HEART RATE: 86 BPM | BODY MASS INDEX: 47.74 KG/M2

## 2023-04-14 DIAGNOSIS — G82.20 SPINAL CORD ISCHEMIA CAUSING LOWER EXTREMITY PARAPARESIS: ICD-10-CM

## 2023-04-14 DIAGNOSIS — M62.838 MUSCLE SPASMS OF BOTH LOWER EXTREMITIES: ICD-10-CM

## 2023-04-14 DIAGNOSIS — G95.11 SPINAL CORD ISCHEMIA CAUSING LOWER EXTREMITY PARAPARESIS: ICD-10-CM

## 2023-04-14 DIAGNOSIS — I10 ESSENTIAL HYPERTENSION: Primary | ICD-10-CM

## 2023-04-14 PROCEDURE — 1160F RVW MEDS BY RX/DR IN RCRD: CPT | Mod: CPTII,S$GLB,, | Performed by: FAMILY MEDICINE

## 2023-04-14 PROCEDURE — 1159F MED LIST DOCD IN RCRD: CPT | Mod: CPTII,S$GLB,, | Performed by: FAMILY MEDICINE

## 2023-04-14 PROCEDURE — 1160F PR REVIEW ALL MEDS BY PRESCRIBER/CLIN PHARMACIST DOCUMENTED: ICD-10-PCS | Mod: CPTII,S$GLB,, | Performed by: FAMILY MEDICINE

## 2023-04-14 PROCEDURE — 3079F DIAST BP 80-89 MM HG: CPT | Mod: CPTII,S$GLB,, | Performed by: FAMILY MEDICINE

## 2023-04-14 PROCEDURE — 1159F PR MEDICATION LIST DOCUMENTED IN MEDICAL RECORD: ICD-10-PCS | Mod: CPTII,S$GLB,, | Performed by: FAMILY MEDICINE

## 2023-04-14 PROCEDURE — 99999 PR PBB SHADOW E&M-EST. PATIENT-LVL IV: ICD-10-PCS | Mod: PBBFAC,,, | Performed by: FAMILY MEDICINE

## 2023-04-14 PROCEDURE — 99999 PR PBB SHADOW E&M-EST. PATIENT-LVL IV: CPT | Mod: PBBFAC,,, | Performed by: FAMILY MEDICINE

## 2023-04-14 PROCEDURE — 3079F PR MOST RECENT DIASTOLIC BLOOD PRESSURE 80-89 MM HG: ICD-10-PCS | Mod: CPTII,S$GLB,, | Performed by: FAMILY MEDICINE

## 2023-04-14 PROCEDURE — 3008F BODY MASS INDEX DOCD: CPT | Mod: CPTII,S$GLB,, | Performed by: FAMILY MEDICINE

## 2023-04-14 PROCEDURE — 3075F PR MOST RECENT SYSTOLIC BLOOD PRESS GE 130-139MM HG: ICD-10-PCS | Mod: CPTII,S$GLB,, | Performed by: FAMILY MEDICINE

## 2023-04-14 PROCEDURE — 3075F SYST BP GE 130 - 139MM HG: CPT | Mod: CPTII,S$GLB,, | Performed by: FAMILY MEDICINE

## 2023-04-14 PROCEDURE — 3008F PR BODY MASS INDEX (BMI) DOCUMENTED: ICD-10-PCS | Mod: CPTII,S$GLB,, | Performed by: FAMILY MEDICINE

## 2023-04-14 PROCEDURE — 99214 PR OFFICE/OUTPT VISIT, EST, LEVL IV, 30-39 MIN: ICD-10-PCS | Mod: S$GLB,,, | Performed by: FAMILY MEDICINE

## 2023-04-14 PROCEDURE — 99214 OFFICE O/P EST MOD 30 MIN: CPT | Mod: S$GLB,,, | Performed by: FAMILY MEDICINE

## 2023-04-14 RX ORDER — BACLOFEN 20 MG/1
20 TABLET ORAL 3 TIMES DAILY
Qty: 270 TABLET | Refills: 3 | Status: SHIPPED | OUTPATIENT
Start: 2023-04-14

## 2023-04-14 NOTE — PROGRESS NOTES
" Patient ID: Meek Hanson is a 37 y.o. male.    Chief Complaint: Follow-up and Spasms (R leg)      Reviewed family, medical, surgical, and social history.    No cp/sob  No change in mental status  No fever  No asymmetrical limb swelling    Objective:      /87 (BP Location: Left arm, Patient Position: Sitting, BP Method: Large (Manual))   Pulse 86   Resp 14   Ht 5' 8" (1.727 m)   Wt (!) 149.1 kg (328 lb 9.6 oz)   SpO2 96%   BMI 49.96 kg/m²   RRR, CTAB, s/nt/nd, no c/c/e, non-toxic appearing, no focal weakness  Assessment:       1. Essential hypertension    2. Muscle spasms of both lower extremities    3. Spinal cord ischemia causing lower extremity paraparesis        Plan:       Essential hypertension    Muscle spasms of both lower extremities  -     baclofen (LIORESAL) 20 MG tablet; Take 1 tablet (20 mg total) by mouth 3 (three) times daily.  Dispense: 270 tablet; Refill: 3    Spinal cord ischemia causing lower extremity paraparesis              Continue current medicines, any changes noted above  Increase baclofen    Labs, radiology studies, and procedures/notes from the last 3 months were reviewed.    Risks, benefits, and side effects were discussed with the patient. All questions were answered to the fullest satisfaction of the patient, and pt verbalized understanding and agreement to treatment plan. Pt was to call with any new or worsening symptoms, or present to the ER.    "

## 2023-04-25 ENCOUNTER — OFFICE VISIT (OUTPATIENT)
Dept: URGENT CARE | Facility: CLINIC | Age: 38
End: 2023-04-25
Payer: MEDICARE

## 2023-04-25 ENCOUNTER — TELEPHONE (OUTPATIENT)
Dept: FAMILY MEDICINE | Facility: CLINIC | Age: 38
End: 2023-04-25
Payer: MEDICARE

## 2023-04-25 VITALS
HEART RATE: 71 BPM | SYSTOLIC BLOOD PRESSURE: 130 MMHG | OXYGEN SATURATION: 98 % | TEMPERATURE: 97 F | DIASTOLIC BLOOD PRESSURE: 84 MMHG | WEIGHT: 315 LBS | BODY MASS INDEX: 47.74 KG/M2 | HEIGHT: 68 IN

## 2023-04-25 DIAGNOSIS — R22.0 SWELLING ASSOCIATED WITH DENTAL STRUCTURE: ICD-10-CM

## 2023-04-25 DIAGNOSIS — G89.29 CHRONIC DENTAL PAIN: ICD-10-CM

## 2023-04-25 DIAGNOSIS — K04.7 TOOTH INFECTION: ICD-10-CM

## 2023-04-25 DIAGNOSIS — K02.9 DENTAL CARIES: Primary | ICD-10-CM

## 2023-04-25 DIAGNOSIS — K08.9 CHRONIC DENTAL PAIN: ICD-10-CM

## 2023-04-25 PROCEDURE — 99213 PR OFFICE/OUTPT VISIT, EST, LEVL III, 20-29 MIN: ICD-10-PCS | Mod: ,,,

## 2023-04-25 PROCEDURE — 99213 OFFICE O/P EST LOW 20 MIN: CPT | Mod: ,,,

## 2023-04-25 RX ORDER — AMOXICILLIN 875 MG/1
875 TABLET, FILM COATED ORAL EVERY 12 HOURS
Qty: 20 TABLET | Refills: 0 | OUTPATIENT
Start: 2023-04-25 | End: 2023-07-02

## 2023-04-25 RX ORDER — AMOXICILLIN 875 MG/1
875 TABLET, FILM COATED ORAL EVERY 12 HOURS
Qty: 20 TABLET | Refills: 0 | Status: SHIPPED | OUTPATIENT
Start: 2023-04-25 | End: 2023-05-05

## 2023-04-25 NOTE — PATIENT INSTRUCTIONS
Call your dentist and get an appointment with them to have teeth corrected.   [facility]  [phone] [id number] [place] [address]

## 2023-04-25 NOTE — TELEPHONE ENCOUNTER
----- Message from Anna aSnchez RN sent at 4/25/2023  2:00 PM CDT -----  See below for advisement  LOV 4/14/23  ----- Message -----  From: Marshall Graves  Sent: 4/25/2023  11:46 AM CDT  To: [facility] Staff        Name of Who is Calling:PT          What is the request in detail:PT is requesting a call back to discuss medication be sent to his pharmacy for an infection in PT tooth. PT is in pain but has been taking tylenol but denies any fever. Please be Advised!          Can the clinic reply by [facility]:no          What Number to Call Back if not in [phone]

## 2023-04-25 NOTE — PROGRESS NOTES
"Subjective:       Patient ID: Meek Hanson is a 37 y.o. male.    Vitals:  height is 5' 8" (1.727 m) and weight is 149.7 kg (330 lb) (abnormal). His oral temperature is 97.4 °F (36.3 °C). His blood pressure is 130/84 and his pulse is 71. His oxygen saturation is 98%.     Chief Complaint: Jaw Pain (RT SIDE JAW AND TOOTH PAIN SINCE YESTERDAY. )    PATIENT STARTED HAVING RT JAW AND TOOTH PAIN SINCE YESTERDAY.  TYLENOL HELPS FOR A SHORT TIME.  PATIENT CALLED HIS DR TODAY AND DR SAID IT WOULD 24-48 HOURS BEFORE HE WOULD HEAR ANYTHING.  HE STATED HE WANTED TO COME HERE TO BE SEEN FOR THE PAIN.       Constitution: Negative.   HENT: Negative.          Positive for dental pain   Neck: neck negative.   Cardiovascular: Negative.    Eyes: Negative.    Respiratory: Negative.     Endocrine: negative.   Genitourinary: Negative.    Musculoskeletal: Negative.    Skin: Negative.    Allergic/Immunologic: Negative.    Neurological: Negative.    Hematologic/Lymphatic: Negative.    Psychiatric/Behavioral: Negative.           Objective:      Physical Exam   Constitutional: He is oriented to person, place, and time. obesity  HENT:   Head: Normocephalic and atraumatic.   Nose: Nose normal.   Mouth/Throat: Oropharynx is clear and moist and mucous membranes are normal. Mucous membranes are moist. Abnormal dentition. Dental caries present.   Neck: Neck supple.   Cardiovascular: Normal rate and regular rhythm.   Pulmonary/Chest: Effort normal and breath sounds normal.   Abdominal: Normal appearance and bowel sounds are normal. Soft.   Musculoskeletal: Normal range of motion.         General: Normal range of motion.   Neurological: no focal deficit. He is alert, oriented to person, place, and time and at baseline.   Skin: Skin is warm. Capillary refill takes less than 2 seconds.   Psychiatric: His behavior is normal. Mood, judgment and thought content normal.   Nursing note and vitals reviewed.      Past medical history and current medications " reviewed.       Assessment:           1. Dental caries    2. Tooth infection    3. Swelling associated with dental structure    4. Chronic dental pain              Plan:         Dental caries  -     amoxicillin (AMOXIL) 875 MG tablet; Take 1 tablet (875 mg total) by mouth every 12 (twelve) hours. for 10 days  Dispense: 20 tablet; Refill: 0    Tooth infection  -     amoxicillin (AMOXIL) 875 MG tablet; Take 1 tablet (875 mg total) by mouth every 12 (twelve) hours. for 10 days  Dispense: 20 tablet; Refill: 0    Swelling associated with dental structure    Chronic dental pain             Patient Instructions   Call your dentist and get an appointment with them to have teeth corrected.   [facility]  [phone] [id number] [place] [address]

## 2023-04-26 DIAGNOSIS — R60.9 EDEMA, UNSPECIFIED TYPE: ICD-10-CM

## 2023-04-27 RX ORDER — FUROSEMIDE 20 MG/1
TABLET ORAL
Qty: 180 TABLET | Refills: 3 | Status: SHIPPED | OUTPATIENT
Start: 2023-04-27

## 2023-07-02 ENCOUNTER — HOSPITAL ENCOUNTER (EMERGENCY)
Facility: HOSPITAL | Age: 38
Discharge: HOME | End: 2023-07-02
Attending: EMERGENCY MEDICINE
Payer: MEDICARE

## 2023-07-02 VITALS
WEIGHT: 315 LBS | HEIGHT: 68 IN | DIASTOLIC BLOOD PRESSURE: 83 MMHG | RESPIRATION RATE: 20 BRPM | TEMPERATURE: 98 F | OXYGEN SATURATION: 95 % | BODY MASS INDEX: 47.74 KG/M2 | HEART RATE: 71 BPM | SYSTOLIC BLOOD PRESSURE: 138 MMHG

## 2023-07-02 DIAGNOSIS — K04.7 DENTAL INFECTION: Primary | ICD-10-CM

## 2023-07-02 DIAGNOSIS — K04.01 ACUTE PULPITIS: ICD-10-CM

## 2023-07-02 DIAGNOSIS — K02.9 DENTAL CARIES: ICD-10-CM

## 2023-07-02 PROCEDURE — 99284 EMERGENCY DEPT VISIT MOD MDM: CPT

## 2023-07-02 RX ORDER — CHLORHEXIDINE GLUCONATE ORAL RINSE 1.2 MG/ML
15 SOLUTION DENTAL 2 TIMES DAILY
Qty: 118 ML | Refills: 0 | Status: SHIPPED | OUTPATIENT
Start: 2023-07-02 | End: 2023-07-16

## 2023-07-02 RX ORDER — IBUPROFEN 800 MG/1
800 TABLET ORAL EVERY 6 HOURS PRN
Qty: 20 TABLET | Refills: 0 | Status: SHIPPED | OUTPATIENT
Start: 2023-07-02

## 2023-07-02 RX ORDER — PENICILLIN V POTASSIUM 500 MG/1
500 TABLET, FILM COATED ORAL 4 TIMES DAILY
Qty: 40 TABLET | Refills: 0 | Status: SHIPPED | OUTPATIENT
Start: 2023-07-02 | End: 2023-07-12
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
## 2023-07-02 NOTE — DISCHARGE INSTRUCTIONS
Take the medications as prescribed. Return for any worsening or new symptoms. Follow up with dentist

## 2023-07-02 NOTE — ED NOTES
Pt discharged ambulatory after NP exam, Instructions for care and follow up provided, pt verbalized understanding

## 2023-10-03 ENCOUNTER — PATIENT MESSAGE (OUTPATIENT)
Dept: ADMINISTRATIVE | Facility: HOSPITAL | Age: 38
End: 2023-10-03
Payer: MEDICARE

## 2023-10-17 ENCOUNTER — PATIENT MESSAGE (OUTPATIENT)
Dept: ADMINISTRATIVE | Facility: HOSPITAL | Age: 38
End: 2023-10-17
Payer: MEDICARE

## 2024-07-09 ENCOUNTER — PATIENT MESSAGE (OUTPATIENT)
Dept: ADMINISTRATIVE | Facility: HOSPITAL | Age: 39
End: 2024-07-09
Payer: MEDICARE